# Patient Record
Sex: FEMALE | Race: BLACK OR AFRICAN AMERICAN | NOT HISPANIC OR LATINO
[De-identification: names, ages, dates, MRNs, and addresses within clinical notes are randomized per-mention and may not be internally consistent; named-entity substitution may affect disease eponyms.]

---

## 2021-02-12 ENCOUNTER — APPOINTMENT (OUTPATIENT)
Dept: RADIOLOGY | Facility: HOSPITAL | Age: 61
End: 2021-02-12
Payer: COMMERCIAL

## 2021-02-12 ENCOUNTER — OUTPATIENT (OUTPATIENT)
Dept: OUTPATIENT SERVICES | Facility: HOSPITAL | Age: 61
LOS: 1 days | End: 2021-02-12
Payer: COMMERCIAL

## 2021-02-12 DIAGNOSIS — R06.89 OTHER ABNORMALITIES OF BREATHING: ICD-10-CM

## 2021-02-12 DIAGNOSIS — K21.9 GASTRO-ESOPHAGEAL REFLUX DISEASE WITHOUT ESOPHAGITIS: ICD-10-CM

## 2021-02-12 DIAGNOSIS — R13.14 DYSPHAGIA, PHARYNGOESOPHAGEAL PHASE: ICD-10-CM

## 2021-02-12 DIAGNOSIS — Z00.00 ENCOUNTER FOR GENERAL ADULT MEDICAL EXAMINATION WITHOUT ABNORMAL FINDINGS: ICD-10-CM

## 2021-02-12 DIAGNOSIS — R49.0 DYSPHONIA: ICD-10-CM

## 2021-02-12 PROCEDURE — 74221 X-RAY XM ESOPHAGUS 2CNTRST: CPT | Mod: 26

## 2021-02-12 PROCEDURE — 74221 X-RAY XM ESOPHAGUS 2CNTRST: CPT

## 2021-03-15 ENCOUNTER — TRANSCRIPTION ENCOUNTER (OUTPATIENT)
Age: 61
End: 2021-03-15

## 2021-06-18 ENCOUNTER — TRANSCRIPTION ENCOUNTER (OUTPATIENT)
Age: 61
End: 2021-06-18

## 2024-08-28 ENCOUNTER — APPOINTMENT (OUTPATIENT)
Dept: GASTROENTEROLOGY | Facility: CLINIC | Age: 64
End: 2024-08-28
Payer: COMMERCIAL

## 2024-08-28 VITALS
SYSTOLIC BLOOD PRESSURE: 134 MMHG | BODY MASS INDEX: 25.58 KG/M2 | HEART RATE: 63 BPM | DIASTOLIC BLOOD PRESSURE: 76 MMHG | WEIGHT: 139 LBS | HEIGHT: 62 IN | OXYGEN SATURATION: 97 %

## 2024-08-28 DIAGNOSIS — Z78.9 OTHER SPECIFIED HEALTH STATUS: ICD-10-CM

## 2024-08-28 DIAGNOSIS — Z86.59 PERSONAL HISTORY OF OTHER MENTAL AND BEHAVIORAL DISORDERS: ICD-10-CM

## 2024-08-28 DIAGNOSIS — K59.00 CONSTIPATION, UNSPECIFIED: ICD-10-CM

## 2024-08-28 DIAGNOSIS — R14.0 ABDOMINAL DISTENSION (GASEOUS): ICD-10-CM

## 2024-08-28 DIAGNOSIS — Z86.79 PERSONAL HISTORY OF OTHER DISEASES OF THE CIRCULATORY SYSTEM: ICD-10-CM

## 2024-08-28 PROCEDURE — 99204 OFFICE O/P NEW MOD 45 MIN: CPT

## 2024-08-28 RX ORDER — SODIUM PICOSULFATE, MAGNESIUM OXIDE, AND ANHYDROUS CITRIC ACID 12; 3.5; 1 G/175ML; G/175ML; MG/175ML
10-3.5-12 MG-GM LIQUID ORAL
Qty: 2 | Refills: 0 | Status: ACTIVE | COMMUNITY
Start: 2024-08-28 | End: 1900-01-01

## 2024-08-28 NOTE — REVIEW OF SYSTEMS
[As Noted in HPI] : as noted in HPI [Fever] : no fever [Chills] : no chills [Red Eyes] : eyes not red [Sore Throat] : no sore throat [Chest Pain] : no chest pain [SOB on Exertion] : no shortness of breath during exertion [Rash] : no rash [Joint Stiffness] : no joint stiffness [Skin Wound] : no skin wound [Dizziness] : no dizziness [Anxiety] : no anxiety [Muscle Weakness] : no muscle weakness [Easy Bruising] : no tendency for easy bruising

## 2024-08-28 NOTE — HISTORY OF PRESENT ILLNESS
[FreeTextEntry1] : 65 yo female with c/o early satiety wtih bloating for 2 years., no n/v. no gerd. weight stable.  Patient has 4 bms a day,  small amounts. no brbpr, no melena. went gluten free , less brain fog.  last colonoscopy 11 years ago normal per patient h/o egd 11 years ago 
WILLIAM

## 2024-08-28 NOTE — ASSESSMENT
[FreeTextEntry1] : 1. early satiety unable to eat large meals with brain fog, does not want to try ppi  for acid related symptoms  plan recommend egd to r/o gastritis, sprue etc. Discussed risks including but not limited to bleeding,infection,drug reaction, perforation,missed lesion,benefits and alternatives of colonoscopy/egd with patient  including no treatment and patient consents to procedure.  -multple small meals labs for celiac disease today  2. average risk for colon cancer recommend colonoscopy for screening  plan colonoscopy to be scheduled

## 2024-08-28 NOTE — PHYSICAL EXAM
[Alert] : alert [Healthy Appearing] : healthy appearing [Sclera] : the sclera and conjunctiva were normal [Normal Appearance] : the appearance of the neck was normal [No Respiratory Distress] : no respiratory distress [Auscultation Breath Sounds / Voice Sounds] : lungs were clear to auscultation bilaterally [Normal S1, S2] : normal S1 and S2 [None] : no edema [Bowel Sounds] : normal bowel sounds [Abdomen Tenderness] : non-tender [Abdomen Soft] : soft [No CVA Tenderness] : no CVA  tenderness [Abnormal Walk] : normal gait [No Clubbing, Cyanosis] : no clubbing or cyanosis of the fingernails [] : no rash [No Focal Deficits] : no focal deficits [Oriented To Time, Place, And Person] : oriented to person, place, and time

## 2024-08-30 LAB
ENDOMYSIUM IGA SER QL: NEGATIVE
ENDOMYSIUM IGA TITR SER: NORMAL
GLIADIN IGA SER QL: <0.2 U/ML
GLIADIN IGG SER QL: <0.4 U/ML
GLIADIN PEPTIDE IGA SER-ACNC: NEGATIVE
GLIADIN PEPTIDE IGG SER-ACNC: NEGATIVE
IGA SER QL IEP: 123 MG/DL
TTG IGA SER IA-ACNC: <0.5 U/ML
TTG IGA SER-ACNC: NEGATIVE
TTG IGG SER IA-ACNC: <0.8 U/ML
TTG IGG SER IA-ACNC: NEGATIVE

## 2024-11-07 ENCOUNTER — APPOINTMENT (OUTPATIENT)
Dept: CARDIOLOGY | Facility: CLINIC | Age: 64
End: 2024-11-07
Payer: COMMERCIAL

## 2024-11-07 ENCOUNTER — NON-APPOINTMENT (OUTPATIENT)
Age: 64
End: 2024-11-07

## 2024-11-07 VITALS
WEIGHT: 139 LBS | BODY MASS INDEX: 25.58 KG/M2 | HEART RATE: 68 BPM | SYSTOLIC BLOOD PRESSURE: 156 MMHG | OXYGEN SATURATION: 99 % | DIASTOLIC BLOOD PRESSURE: 83 MMHG | HEIGHT: 62 IN

## 2024-11-07 VITALS — SYSTOLIC BLOOD PRESSURE: 160 MMHG | DIASTOLIC BLOOD PRESSURE: 86 MMHG

## 2024-11-07 DIAGNOSIS — R07.89 OTHER CHEST PAIN: ICD-10-CM

## 2024-11-07 DIAGNOSIS — Z84.89 FAMILY HISTORY OF OTHER SPECIFIED CONDITIONS: ICD-10-CM

## 2024-11-07 DIAGNOSIS — Z83.438 FAMILY HISTORY OF OTHER DISORDER OF LIPOPROTEIN METABOLISM AND OTHER LIPIDEMIA: ICD-10-CM

## 2024-11-07 DIAGNOSIS — Z82.49 FAMILY HISTORY OF ISCHEMIC HEART DISEASE AND OTHER DISEASES OF THE CIRCULATORY SYSTEM: ICD-10-CM

## 2024-11-07 DIAGNOSIS — I10 ESSENTIAL (PRIMARY) HYPERTENSION: ICD-10-CM

## 2024-11-07 DIAGNOSIS — Z00.00 ENCOUNTER FOR GENERAL ADULT MEDICAL EXAMINATION W/OUT ABNORMAL FINDINGS: ICD-10-CM

## 2024-11-07 DIAGNOSIS — R06.02 SHORTNESS OF BREATH: ICD-10-CM

## 2024-11-07 PROCEDURE — 99205 OFFICE O/P NEW HI 60 MIN: CPT

## 2024-11-07 RX ORDER — LOSARTAN POTASSIUM 25 MG/1
25 TABLET, FILM COATED ORAL DAILY
Qty: 30 | Refills: 3 | Status: ACTIVE | COMMUNITY
Start: 2024-11-07 | End: 1900-01-01

## 2024-11-07 RX ORDER — AMLODIPINE BESYLATE 5 MG/1
5 TABLET ORAL DAILY
Qty: 30 | Refills: 5 | Status: ACTIVE | COMMUNITY
Start: 2024-11-07 | End: 1900-01-01

## 2024-11-08 PROBLEM — R06.02 EXERTIONAL SHORTNESS OF BREATH: Status: ACTIVE | Noted: 2024-11-08

## 2024-11-11 ENCOUNTER — NON-APPOINTMENT (OUTPATIENT)
Age: 64
End: 2024-11-11

## 2024-11-19 ENCOUNTER — APPOINTMENT (OUTPATIENT)
Dept: PULMONOLOGY | Facility: CLINIC | Age: 64
End: 2024-11-19
Payer: COMMERCIAL

## 2024-11-19 ENCOUNTER — LABORATORY RESULT (OUTPATIENT)
Age: 64
End: 2024-11-19

## 2024-11-19 VITALS
WEIGHT: 137 LBS | DIASTOLIC BLOOD PRESSURE: 82 MMHG | HEIGHT: 62.6 IN | SYSTOLIC BLOOD PRESSURE: 145 MMHG | BODY MASS INDEX: 24.58 KG/M2 | HEART RATE: 71 BPM | OXYGEN SATURATION: 100 %

## 2024-11-19 DIAGNOSIS — R06.02 SHORTNESS OF BREATH: ICD-10-CM

## 2024-11-19 DIAGNOSIS — R07.89 OTHER CHEST PAIN: ICD-10-CM

## 2024-11-19 PROCEDURE — 99205 OFFICE O/P NEW HI 60 MIN: CPT | Mod: 25

## 2024-11-19 PROCEDURE — 94726 PLETHYSMOGRAPHY LUNG VOLUMES: CPT

## 2024-11-19 PROCEDURE — 94060 EVALUATION OF WHEEZING: CPT

## 2024-11-19 PROCEDURE — ZZZZZ: CPT

## 2024-11-19 PROCEDURE — 94729 DIFFUSING CAPACITY: CPT

## 2024-11-20 LAB
BASOPHILS # BLD AUTO: 0.04 K/UL
BASOPHILS NFR BLD AUTO: 1 %
EOSINOPHIL # BLD AUTO: 0.12 K/UL
EOSINOPHIL NFR BLD AUTO: 3.1 %
HCT VFR BLD CALC: 37.4 %
HGB BLD-MCNC: 11.6 G/DL
IMM GRANULOCYTES NFR BLD AUTO: 0.3 %
LYMPHOCYTES # BLD AUTO: 1.41 K/UL
LYMPHOCYTES NFR BLD AUTO: 36.8 %
MAN DIFF?: NORMAL
MCHC RBC-ENTMCNC: 24.3 PG
MCHC RBC-ENTMCNC: 31 G/DL
MCV RBC AUTO: 78.2 FL
MONOCYTES # BLD AUTO: 0.38 K/UL
MONOCYTES NFR BLD AUTO: 9.9 %
NEUTROPHILS # BLD AUTO: 1.87 K/UL
NEUTROPHILS NFR BLD AUTO: 48.9 %
PLATELET # BLD AUTO: 174 K/UL
RBC # BLD: 4.78 M/UL
RBC # FLD: 14.8 %
WBC # FLD AUTO: 3.83 K/UL

## 2024-11-21 ENCOUNTER — NON-APPOINTMENT (OUTPATIENT)
Age: 64
End: 2024-11-21

## 2024-11-22 ENCOUNTER — NON-APPOINTMENT (OUTPATIENT)
Age: 64
End: 2024-11-22

## 2024-11-22 LAB
A ALTERNATA IGE QN: <0.1 KUA/L
A FUMIGATUS IGE QN: <0.1 KUA/L
C ALBICANS IGE QN: <0.1 KUA/L
C HERBARUM IGE QN: <0.1 KUA/L
CAT DANDER IGE QN: <0.1 KUA/L
COMMON RAGWEED IGE QN: <0.1 KUA/L
D FARINAE IGE QN: 29.9 KUA/L
D PTERONYSS IGE QN: 9.81 KUA/L
DEPRECATED A ALTERNATA IGE RAST QL: 0
DEPRECATED A FUMIGATUS IGE RAST QL: 0
DEPRECATED C ALBICANS IGE RAST QL: 0
DEPRECATED C HERBARUM IGE RAST QL: 0
DEPRECATED CAT DANDER IGE RAST QL: 0
DEPRECATED COMMON RAGWEED IGE RAST QL: 0
DEPRECATED D FARINAE IGE RAST QL: 4
DEPRECATED D PTERONYSS IGE RAST QL: 3
DEPRECATED DOG DANDER IGE RAST QL: 0
DEPRECATED M RACEMOSUS IGE RAST QL: 0
DEPRECATED ROACH IGE RAST QL: 0
DEPRECATED TIMOTHY IGE RAST QL: 0
DEPRECATED WHITE OAK IGE RAST QL: 0
DOG DANDER IGE QN: <0.1 KUA/L
M RACEMOSUS IGE QN: <0.1 KUA/L
ROACH IGE QN: <0.1 KUA/L
TIMOTHY IGE QN: <0.1 KUA/L
TOTAL IGE SMQN RAST: 147 KU/L
WHITE OAK IGE QN: <0.1 KUA/L

## 2024-11-27 ENCOUNTER — TRANSCRIPTION ENCOUNTER (OUTPATIENT)
Age: 64
End: 2024-11-27

## 2024-12-06 ENCOUNTER — APPOINTMENT (OUTPATIENT)
Dept: CARDIOLOGY | Facility: CLINIC | Age: 64
End: 2024-12-06

## 2024-12-12 DIAGNOSIS — R05.9 COUGH, UNSPECIFIED: ICD-10-CM

## 2024-12-12 RX ORDER — AZELASTINE HYDROCHLORIDE 137 UG/1
137 SPRAY, METERED NASAL TWICE DAILY
Qty: 1 | Refills: 5 | Status: ACTIVE | COMMUNITY
Start: 2024-12-12 | End: 1900-01-01

## 2024-12-12 RX ORDER — FLUTICASONE PROPIONATE 50 UG/1
50 SPRAY, METERED NASAL
Qty: 1 | Refills: 2 | Status: ACTIVE | COMMUNITY
Start: 2024-12-12 | End: 1900-01-01

## 2024-12-18 ENCOUNTER — APPOINTMENT (OUTPATIENT)
Dept: CARDIOLOGY | Facility: CLINIC | Age: 64
End: 2024-12-18
Payer: COMMERCIAL

## 2024-12-18 PROCEDURE — 93306 TTE W/DOPPLER COMPLETE: CPT

## 2024-12-23 ENCOUNTER — NON-APPOINTMENT (OUTPATIENT)
Age: 64
End: 2024-12-23

## 2024-12-31 ENCOUNTER — APPOINTMENT (OUTPATIENT)
Dept: RADIOLOGY | Facility: IMAGING CENTER | Age: 64
End: 2024-12-31
Payer: COMMERCIAL

## 2024-12-31 ENCOUNTER — NON-APPOINTMENT (OUTPATIENT)
Age: 64
End: 2024-12-31

## 2024-12-31 ENCOUNTER — OUTPATIENT (OUTPATIENT)
Dept: OUTPATIENT SERVICES | Facility: HOSPITAL | Age: 64
LOS: 1 days | End: 2024-12-31
Payer: COMMERCIAL

## 2024-12-31 DIAGNOSIS — R05.9 COUGH, UNSPECIFIED: ICD-10-CM

## 2024-12-31 PROCEDURE — 71046 X-RAY EXAM CHEST 2 VIEWS: CPT | Mod: 26

## 2024-12-31 PROCEDURE — 71046 X-RAY EXAM CHEST 2 VIEWS: CPT

## 2025-01-01 ENCOUNTER — NON-APPOINTMENT (OUTPATIENT)
Age: 65
End: 2025-01-01

## 2025-01-01 LAB
RAPID RVP RESULT: NOT DETECTED
SARS-COV-2 RNA RESP QL NAA+PROBE: NOT DETECTED

## 2025-01-02 ENCOUNTER — APPOINTMENT (OUTPATIENT)
Dept: RADIOLOGY | Facility: IMAGING CENTER | Age: 65
End: 2025-01-02

## 2025-01-06 ENCOUNTER — NON-APPOINTMENT (OUTPATIENT)
Age: 65
End: 2025-01-06

## 2025-01-06 RX ORDER — AZITHROMYCIN 250 MG/1
250 TABLET, FILM COATED ORAL
Qty: 1 | Refills: 0 | Status: ACTIVE | COMMUNITY
Start: 2025-01-06 | End: 1900-01-01

## 2025-01-08 ENCOUNTER — EMERGENCY (EMERGENCY)
Facility: HOSPITAL | Age: 65
LOS: 1 days | Discharge: HOME | End: 2025-01-08
Attending: STUDENT IN AN ORGANIZED HEALTH CARE EDUCATION/TRAINING PROGRAM
Payer: COMMERCIAL

## 2025-01-08 VITALS
SYSTOLIC BLOOD PRESSURE: 157 MMHG | DIASTOLIC BLOOD PRESSURE: 82 MMHG | TEMPERATURE: 99 F | OXYGEN SATURATION: 98 % | HEART RATE: 116 BPM | HEIGHT: 62 IN | WEIGHT: 134.92 LBS | RESPIRATION RATE: 18 BRPM

## 2025-01-08 PROCEDURE — 99285 EMERGENCY DEPT VISIT HI MDM: CPT

## 2025-01-09 VITALS
SYSTOLIC BLOOD PRESSURE: 137 MMHG | HEART RATE: 80 BPM | TEMPERATURE: 99 F | RESPIRATION RATE: 18 BRPM | DIASTOLIC BLOOD PRESSURE: 92 MMHG | OXYGEN SATURATION: 98 %

## 2025-01-09 LAB
ALBUMIN SERPL ELPH-MCNC: 4 G/DL — SIGNIFICANT CHANGE UP (ref 3.3–5)
ALP SERPL-CCNC: 85 U/L — SIGNIFICANT CHANGE UP (ref 40–120)
ALT FLD-CCNC: 31 U/L — SIGNIFICANT CHANGE UP (ref 10–45)
ANION GAP SERPL CALC-SCNC: 13 MMOL/L — SIGNIFICANT CHANGE UP (ref 5–17)
AST SERPL-CCNC: 23 U/L — SIGNIFICANT CHANGE UP (ref 10–40)
BASOPHILS # BLD AUTO: 0.03 K/UL — SIGNIFICANT CHANGE UP (ref 0–0.2)
BASOPHILS NFR BLD AUTO: 0.4 % — SIGNIFICANT CHANGE UP (ref 0–2)
BILIRUB SERPL-MCNC: 0.2 MG/DL — SIGNIFICANT CHANGE UP (ref 0.2–1.2)
BUN SERPL-MCNC: 18 MG/DL — SIGNIFICANT CHANGE UP (ref 7–23)
CALCIUM SERPL-MCNC: 9.2 MG/DL — SIGNIFICANT CHANGE UP (ref 8.4–10.5)
CHLORIDE SERPL-SCNC: 97 MMOL/L — SIGNIFICANT CHANGE UP (ref 96–108)
CO2 SERPL-SCNC: 26 MMOL/L — SIGNIFICANT CHANGE UP (ref 22–31)
CREAT SERPL-MCNC: 0.61 MG/DL — SIGNIFICANT CHANGE UP (ref 0.5–1.3)
EGFR: 100 ML/MIN/1.73M2 — SIGNIFICANT CHANGE UP
EGFR: 100 ML/MIN/1.73M2 — SIGNIFICANT CHANGE UP
EOSINOPHIL # BLD AUTO: 0.07 K/UL — SIGNIFICANT CHANGE UP (ref 0–0.5)
EOSINOPHIL NFR BLD AUTO: 1 % — SIGNIFICANT CHANGE UP (ref 0–6)
FLUAV AG NPH QL: SIGNIFICANT CHANGE UP
FLUBV AG NPH QL: SIGNIFICANT CHANGE UP
GLUCOSE SERPL-MCNC: 93 MG/DL — SIGNIFICANT CHANGE UP (ref 70–99)
HCT VFR BLD CALC: 33.5 % — LOW (ref 34.5–45)
HGB BLD-MCNC: 10.4 G/DL — LOW (ref 11.5–15.5)
IMM GRANULOCYTES NFR BLD AUTO: 0.6 % — SIGNIFICANT CHANGE UP (ref 0–0.9)
LIDOCAIN IGE QN: 19 U/L — SIGNIFICANT CHANGE UP (ref 7–60)
LYMPHOCYTES # BLD AUTO: 1.29 K/UL — SIGNIFICANT CHANGE UP (ref 1–3.3)
LYMPHOCYTES # BLD AUTO: 18.9 % — SIGNIFICANT CHANGE UP (ref 13–44)
MAGNESIUM SERPL-MCNC: 2.2 MG/DL — SIGNIFICANT CHANGE UP (ref 1.6–2.6)
MCHC RBC-ENTMCNC: 23.7 PG — LOW (ref 27–34)
MCHC RBC-ENTMCNC: 31 G/DL — LOW (ref 32–36)
MCV RBC AUTO: 76.3 FL — LOW (ref 80–100)
MONOCYTES # BLD AUTO: 0.48 K/UL — SIGNIFICANT CHANGE UP (ref 0–0.9)
MONOCYTES NFR BLD AUTO: 7 % — SIGNIFICANT CHANGE UP (ref 2–14)
NEUTROPHILS # BLD AUTO: 4.91 K/UL — SIGNIFICANT CHANGE UP (ref 1.8–7.4)
NEUTROPHILS NFR BLD AUTO: 72.1 % — SIGNIFICANT CHANGE UP (ref 43–77)
NRBC # BLD: 0 /100 WBCS — SIGNIFICANT CHANGE UP (ref 0–0)
NRBC BLD-RTO: 0 /100 WBCS — SIGNIFICANT CHANGE UP (ref 0–0)
NT-PROBNP SERPL-SCNC: 61 PG/ML — SIGNIFICANT CHANGE UP (ref 0–300)
PLATELET # BLD AUTO: 205 K/UL — SIGNIFICANT CHANGE UP (ref 150–400)
POTASSIUM SERPL-MCNC: 3.6 MMOL/L — SIGNIFICANT CHANGE UP (ref 3.5–5.3)
POTASSIUM SERPL-SCNC: 3.6 MMOL/L — SIGNIFICANT CHANGE UP (ref 3.5–5.3)
PROT SERPL-MCNC: 8.3 G/DL — SIGNIFICANT CHANGE UP (ref 6–8.3)
RBC # BLD: 4.39 M/UL — SIGNIFICANT CHANGE UP (ref 3.8–5.2)
RBC # FLD: 14.7 % — HIGH (ref 10.3–14.5)
RSV RNA NPH QL NAA+NON-PROBE: SIGNIFICANT CHANGE UP
SARS-COV-2 RNA SPEC QL NAA+PROBE: SIGNIFICANT CHANGE UP
SODIUM SERPL-SCNC: 136 MMOL/L — SIGNIFICANT CHANGE UP (ref 135–145)
TROPONIN T, HIGH SENSITIVITY RESULT: 9 NG/L — SIGNIFICANT CHANGE UP (ref 0–51)
WBC # BLD: 6.82 K/UL — SIGNIFICANT CHANGE UP (ref 3.8–10.5)
WBC # FLD AUTO: 6.82 K/UL — SIGNIFICANT CHANGE UP (ref 3.8–10.5)

## 2025-01-09 PROCEDURE — 71046 X-RAY EXAM CHEST 2 VIEWS: CPT | Mod: 26

## 2025-01-09 PROCEDURE — 96374 THER/PROPH/DIAG INJ IV PUSH: CPT

## 2025-01-09 PROCEDURE — 96375 TX/PRO/DX INJ NEW DRUG ADDON: CPT

## 2025-01-09 PROCEDURE — 71046 X-RAY EXAM CHEST 2 VIEWS: CPT

## 2025-01-09 PROCEDURE — 87637 SARSCOV2&INF A&B&RSV AMP PRB: CPT

## 2025-01-09 PROCEDURE — 84484 ASSAY OF TROPONIN QUANT: CPT

## 2025-01-09 PROCEDURE — 85025 COMPLETE CBC W/AUTO DIFF WBC: CPT

## 2025-01-09 PROCEDURE — 36415 COLL VENOUS BLD VENIPUNCTURE: CPT

## 2025-01-09 PROCEDURE — 80053 COMPREHEN METABOLIC PANEL: CPT

## 2025-01-09 PROCEDURE — 83735 ASSAY OF MAGNESIUM: CPT

## 2025-01-09 PROCEDURE — 93005 ELECTROCARDIOGRAM TRACING: CPT | Mod: 76

## 2025-01-09 PROCEDURE — 83880 ASSAY OF NATRIURETIC PEPTIDE: CPT

## 2025-01-09 PROCEDURE — 83690 ASSAY OF LIPASE: CPT

## 2025-01-09 PROCEDURE — 99285 EMERGENCY DEPT VISIT HI MDM: CPT | Mod: 25

## 2025-01-09 RX ORDER — MAGNESIUM, ALUMINUM HYDROXIDE 200-200 MG
30 TABLET,CHEWABLE ORAL ONCE
Refills: 0 | Status: COMPLETED | OUTPATIENT
Start: 2025-01-09 | End: 2025-01-09

## 2025-01-09 RX ORDER — ACETAMINOPHEN 500 MG/5ML
1000 LIQUID (ML) ORAL ONCE
Refills: 0 | Status: COMPLETED | OUTPATIENT
Start: 2025-01-09 | End: 2025-01-09

## 2025-01-09 RX ADMIN — Medication 30 MILLILITER(S): at 02:23

## 2025-01-09 RX ADMIN — Medication 20 MILLIGRAM(S): at 02:23

## 2025-01-09 RX ADMIN — Medication 400 MILLIGRAM(S): at 02:25

## 2025-01-10 ENCOUNTER — APPOINTMENT (OUTPATIENT)
Dept: CV DIAGNOSITCS | Facility: HOSPITAL | Age: 65
End: 2025-01-10

## 2025-01-13 ENCOUNTER — APPOINTMENT (OUTPATIENT)
Dept: PULMONOLOGY | Facility: CLINIC | Age: 65
End: 2025-01-13
Payer: COMMERCIAL

## 2025-01-13 VITALS
BODY MASS INDEX: 23.22 KG/M2 | WEIGHT: 129.38 LBS | TEMPERATURE: 98.1 F | OXYGEN SATURATION: 96 % | RESPIRATION RATE: 15 BRPM | SYSTOLIC BLOOD PRESSURE: 137 MMHG | HEART RATE: 74 BPM | DIASTOLIC BLOOD PRESSURE: 79 MMHG | HEIGHT: 62.6 IN

## 2025-01-13 DIAGNOSIS — R05.9 COUGH, UNSPECIFIED: ICD-10-CM

## 2025-01-13 PROCEDURE — 99213 OFFICE O/P EST LOW 20 MIN: CPT

## 2025-01-31 ENCOUNTER — APPOINTMENT (OUTPATIENT)
Dept: INTERNAL MEDICINE | Facility: CLINIC | Age: 65
End: 2025-01-31
Payer: COMMERCIAL

## 2025-01-31 VITALS
HEIGHT: 62 IN | SYSTOLIC BLOOD PRESSURE: 132 MMHG | WEIGHT: 126 LBS | HEART RATE: 71 BPM | OXYGEN SATURATION: 100 % | DIASTOLIC BLOOD PRESSURE: 80 MMHG | TEMPERATURE: 98.8 F | BODY MASS INDEX: 23.19 KG/M2

## 2025-01-31 DIAGNOSIS — K21.9 GASTRO-ESOPHAGEAL REFLUX DISEASE W/OUT ESOPHAGITIS: ICD-10-CM

## 2025-01-31 DIAGNOSIS — R13.10 DYSPHAGIA, UNSPECIFIED: ICD-10-CM

## 2025-01-31 PROCEDURE — 99203 OFFICE O/P NEW LOW 30 MIN: CPT

## 2025-02-06 ENCOUNTER — APPOINTMENT (OUTPATIENT)
Dept: INTERNAL MEDICINE | Facility: CLINIC | Age: 65
End: 2025-02-06

## 2025-02-06 LAB — UREA BREATH TEST QL: NEGATIVE

## 2025-02-07 ENCOUNTER — RX CHANGE (OUTPATIENT)
Age: 65
End: 2025-02-07

## 2025-02-07 ENCOUNTER — NON-APPOINTMENT (OUTPATIENT)
Age: 65
End: 2025-02-07

## 2025-02-11 ENCOUNTER — APPOINTMENT (OUTPATIENT)
Dept: CARDIOLOGY | Facility: CLINIC | Age: 65
End: 2025-02-11
Payer: COMMERCIAL

## 2025-02-11 PROCEDURE — G2211 COMPLEX E/M VISIT ADD ON: CPT | Mod: NC,95

## 2025-02-11 PROCEDURE — 99214 OFFICE O/P EST MOD 30 MIN: CPT | Mod: 95

## 2025-02-11 RX ORDER — AMLODIPINE BESYLATE 5 MG/1
5 TABLET ORAL DAILY
Qty: 90 | Refills: 3 | Status: ACTIVE | COMMUNITY
Start: 2025-02-11 | End: 1900-01-01

## 2025-02-14 DIAGNOSIS — M79.669 PAIN IN UNSPECIFIED LOWER LEG: ICD-10-CM

## 2025-02-15 ENCOUNTER — TRANSCRIPTION ENCOUNTER (OUTPATIENT)
Age: 65
End: 2025-02-15

## 2025-02-16 ENCOUNTER — OUTPATIENT (OUTPATIENT)
Dept: OUTPATIENT SERVICES | Facility: HOSPITAL | Age: 65
LOS: 1 days | End: 2025-02-16
Payer: COMMERCIAL

## 2025-02-16 DIAGNOSIS — Z00.8 ENCOUNTER FOR OTHER GENERAL EXAMINATION: ICD-10-CM

## 2025-02-16 DIAGNOSIS — M79.669 PAIN IN UNSPECIFIED LOWER LEG: ICD-10-CM

## 2025-02-16 PROCEDURE — 93970 EXTREMITY STUDY: CPT

## 2025-03-07 ENCOUNTER — INPATIENT (INPATIENT)
Facility: HOSPITAL | Age: 65
LOS: 3 days | Discharge: HOME | DRG: 313 | End: 2025-03-11
Attending: INTERNAL MEDICINE | Admitting: STUDENT IN AN ORGANIZED HEALTH CARE EDUCATION/TRAINING PROGRAM
Payer: COMMERCIAL

## 2025-03-07 ENCOUNTER — APPOINTMENT (OUTPATIENT)
Dept: INTERNAL MEDICINE | Facility: CLINIC | Age: 65
End: 2025-03-07
Payer: COMMERCIAL

## 2025-03-07 ENCOUNTER — APPOINTMENT (OUTPATIENT)
Dept: INTERNAL MEDICINE | Facility: CLINIC | Age: 65
End: 2025-03-07

## 2025-03-07 ENCOUNTER — NON-APPOINTMENT (OUTPATIENT)
Age: 65
End: 2025-03-07

## 2025-03-07 VITALS
WEIGHT: 115.96 LBS | OXYGEN SATURATION: 99 % | HEART RATE: 111 BPM | HEIGHT: 62 IN | SYSTOLIC BLOOD PRESSURE: 117 MMHG | RESPIRATION RATE: 18 BRPM | DIASTOLIC BLOOD PRESSURE: 73 MMHG | TEMPERATURE: 98 F

## 2025-03-07 VITALS — HEART RATE: 118 BPM | OXYGEN SATURATION: 98 %

## 2025-03-07 VITALS
BODY MASS INDEX: 21.35 KG/M2 | OXYGEN SATURATION: 94 % | WEIGHT: 116 LBS | SYSTOLIC BLOOD PRESSURE: 122 MMHG | HEIGHT: 62 IN | HEART RATE: 107 BPM | DIASTOLIC BLOOD PRESSURE: 80 MMHG

## 2025-03-07 DIAGNOSIS — Z13.228 ENCOUNTER FOR SCREENING FOR OTHER METABOLIC DISORDERS: ICD-10-CM

## 2025-03-07 DIAGNOSIS — R07.9 CHEST PAIN, UNSPECIFIED: ICD-10-CM

## 2025-03-07 LAB
ALBUMIN SERPL ELPH-MCNC: 2.9 G/DL — LOW (ref 3.3–5)
ALP SERPL-CCNC: 49 U/L — SIGNIFICANT CHANGE UP (ref 40–120)
ALT FLD-CCNC: 10 U/L — SIGNIFICANT CHANGE UP (ref 10–45)
ANION GAP SERPL CALC-SCNC: 13 MMOL/L — SIGNIFICANT CHANGE UP (ref 5–17)
ANISOCYTOSIS BLD QL: SLIGHT — SIGNIFICANT CHANGE UP
APPEARANCE UR: CLEAR — SIGNIFICANT CHANGE UP
AST SERPL-CCNC: 15 U/L — SIGNIFICANT CHANGE UP (ref 10–40)
BACTERIA # UR AUTO: NEGATIVE /HPF — SIGNIFICANT CHANGE UP
BASOPHILS # BLD AUTO: 0 K/UL — SIGNIFICANT CHANGE UP (ref 0–0.2)
BASOPHILS NFR BLD AUTO: 0 % — SIGNIFICANT CHANGE UP (ref 0–2)
BILIRUB SERPL-MCNC: 0.3 MG/DL — SIGNIFICANT CHANGE UP (ref 0.2–1.2)
BILIRUB UR-MCNC: NEGATIVE — SIGNIFICANT CHANGE UP
BUN SERPL-MCNC: 14 MG/DL — SIGNIFICANT CHANGE UP (ref 7–23)
CALCIUM SERPL-MCNC: 8.9 MG/DL — SIGNIFICANT CHANGE UP (ref 8.4–10.5)
CAST: 3 /LPF — SIGNIFICANT CHANGE UP (ref 0–4)
CHLORIDE SERPL-SCNC: 97 MMOL/L — SIGNIFICANT CHANGE UP (ref 96–108)
CO2 SERPL-SCNC: 24 MMOL/L — SIGNIFICANT CHANGE UP (ref 22–31)
COLOR SPEC: YELLOW — SIGNIFICANT CHANGE UP
CREAT SERPL-MCNC: 0.51 MG/DL — SIGNIFICANT CHANGE UP (ref 0.5–1.3)
D DIMER BLD IA.RAPID-MCNC: 629 NG/ML DDU — HIGH
DACRYOCYTES BLD QL SMEAR: SLIGHT — SIGNIFICANT CHANGE UP
DIFF PNL FLD: NEGATIVE — SIGNIFICANT CHANGE UP
EGFR: 105 ML/MIN/1.73M2 — SIGNIFICANT CHANGE UP
EGFR: 105 ML/MIN/1.73M2 — SIGNIFICANT CHANGE UP
ELLIPTOCYTES BLD QL SMEAR: SLIGHT — SIGNIFICANT CHANGE UP
EOSINOPHIL # BLD AUTO: 0.24 K/UL — SIGNIFICANT CHANGE UP (ref 0–0.5)
EOSINOPHIL NFR BLD AUTO: 1.7 % — SIGNIFICANT CHANGE UP (ref 0–6)
FLUAV AG NPH QL: SIGNIFICANT CHANGE UP
FLUBV AG NPH QL: SIGNIFICANT CHANGE UP
GAS PNL BLDV: SIGNIFICANT CHANGE UP
GLUCOSE SERPL-MCNC: 97 MG/DL — SIGNIFICANT CHANGE UP (ref 70–99)
GLUCOSE UR QL: NEGATIVE MG/DL — SIGNIFICANT CHANGE UP
HCT VFR BLD CALC: 25.7 % — LOW (ref 34.5–45)
HGB BLD-MCNC: 8 G/DL — LOW (ref 11.5–15.5)
HYPOCHROMIA BLD QL: SLIGHT — SIGNIFICANT CHANGE UP
KETONES UR-MCNC: 15 MG/DL
LEUKOCYTE ESTERASE UR-ACNC: NEGATIVE — SIGNIFICANT CHANGE UP
LIDOCAIN IGE QN: 14 U/L — SIGNIFICANT CHANGE UP (ref 7–60)
LYMPHOCYTES # BLD AUTO: 1.22 K/UL — SIGNIFICANT CHANGE UP (ref 1–3.3)
LYMPHOCYTES # BLD AUTO: 8.6 % — LOW (ref 13–44)
MACROCYTES BLD QL: SLIGHT — SIGNIFICANT CHANGE UP
MAGNESIUM SERPL-MCNC: 2 MG/DL — SIGNIFICANT CHANGE UP (ref 1.6–2.6)
MANUAL SMEAR VERIFICATION: SIGNIFICANT CHANGE UP
MCHC RBC-ENTMCNC: 22.8 PG — LOW (ref 27–34)
MCHC RBC-ENTMCNC: 31.1 G/DL — LOW (ref 32–36)
MCV RBC AUTO: 73.2 FL — LOW (ref 80–100)
MONOCYTES # BLD AUTO: 0.37 K/UL — SIGNIFICANT CHANGE UP (ref 0–0.9)
MONOCYTES NFR BLD AUTO: 2.6 % — SIGNIFICANT CHANGE UP (ref 2–14)
MYELOCYTES NFR BLD: 0.9 % — HIGH (ref 0–0)
NEUTROPHILS # BLD AUTO: 12.24 K/UL — HIGH (ref 1.8–7.4)
NEUTROPHILS NFR BLD AUTO: 82.8 % — HIGH (ref 43–77)
NEUTS BAND # BLD: 3.4 % — SIGNIFICANT CHANGE UP (ref 0–8)
NEUTS BAND NFR BLD: 3.4 % — SIGNIFICANT CHANGE UP (ref 0–8)
NITRITE UR-MCNC: NEGATIVE — SIGNIFICANT CHANGE UP
NT-PROBNP SERPL-SCNC: 318 PG/ML — HIGH (ref 0–300)
OVALOCYTES BLD QL SMEAR: SLIGHT — SIGNIFICANT CHANGE UP
PH UR: 5.5 — SIGNIFICANT CHANGE UP (ref 5–8)
PLAT MORPH BLD: ABNORMAL
PLATELET # BLD AUTO: 282 K/UL — SIGNIFICANT CHANGE UP (ref 150–400)
POIKILOCYTOSIS BLD QL AUTO: SLIGHT — SIGNIFICANT CHANGE UP
POLYCHROMASIA BLD QL SMEAR: SLIGHT — SIGNIFICANT CHANGE UP
POTASSIUM SERPL-MCNC: 3.5 MMOL/L — SIGNIFICANT CHANGE UP (ref 3.5–5.3)
POTASSIUM SERPL-SCNC: 3.5 MMOL/L — SIGNIFICANT CHANGE UP (ref 3.5–5.3)
PROT SERPL-MCNC: 6.9 G/DL — SIGNIFICANT CHANGE UP (ref 6–8.3)
PROT UR-MCNC: 30 MG/DL
RBC # BLD: 3.51 M/UL — LOW (ref 3.8–5.2)
RBC # FLD: 17.1 % — HIGH (ref 10.3–14.5)
RBC BLD AUTO: ABNORMAL
RBC CASTS # UR COMP ASSIST: 2 /HPF — SIGNIFICANT CHANGE UP (ref 0–4)
RSV RNA NPH QL NAA+NON-PROBE: SIGNIFICANT CHANGE UP
SARS-COV-2 RNA SPEC QL NAA+PROBE: SIGNIFICANT CHANGE UP
SODIUM SERPL-SCNC: 134 MMOL/L — LOW (ref 135–145)
SP GR SPEC: 1.02 — SIGNIFICANT CHANGE UP (ref 1–1.03)
SQUAMOUS # UR AUTO: 3 /HPF — SIGNIFICANT CHANGE UP (ref 0–5)
TROPONIN T, HIGH SENSITIVITY RESULT: 16 NG/L — SIGNIFICANT CHANGE UP (ref 0–51)
TROPONIN T, HIGH SENSITIVITY RESULT: 16 NG/L — SIGNIFICANT CHANGE UP (ref 0–51)
UROBILINOGEN FLD QL: 1 MG/DL — SIGNIFICANT CHANGE UP (ref 0.2–1)
WBC # BLD: 14.2 K/UL — HIGH (ref 3.8–10.5)
WBC # FLD AUTO: 14.2 K/UL — HIGH (ref 3.8–10.5)
WBC UR QL: 1 /HPF — SIGNIFICANT CHANGE UP (ref 0–5)

## 2025-03-07 PROCEDURE — 71275 CT ANGIOGRAPHY CHEST: CPT | Mod: 26

## 2025-03-07 PROCEDURE — 99285 EMERGENCY DEPT VISIT HI MDM: CPT

## 2025-03-07 PROCEDURE — 74177 CT ABD & PELVIS W/CONTRAST: CPT | Mod: 26

## 2025-03-07 PROCEDURE — 93000 ELECTROCARDIOGRAM COMPLETE: CPT

## 2025-03-07 PROCEDURE — 99215 OFFICE O/P EST HI 40 MIN: CPT

## 2025-03-07 RX ORDER — ACETAMINOPHEN 500 MG/5ML
1000 LIQUID (ML) ORAL ONCE
Refills: 0 | Status: COMPLETED | OUTPATIENT
Start: 2025-03-07 | End: 2025-03-07

## 2025-03-07 RX ADMIN — Medication 400 MILLIGRAM(S): at 17:31

## 2025-03-07 RX ADMIN — Medication 1000 MILLILITER(S): at 17:31

## 2025-03-07 NOTE — CONSULT NOTE ADULT - SUBJECTIVE AND OBJECTIVE BOX
[facility] Attending Consultation Note     Patient seen and evaluated at bedside    Chief Complaint: dyspnea     HPI:  64F with HTN, MAURICIO, elevated NORMA on OP lab, recent ER visits for PNA and dysphagia sxs. She was at Dr. Lyman office today noted to be hypoxic, tachycardiac, and reported + 40 lbs wt loss. She was sent in to the ER for concern of thromboembolic event.     PMHx:   HTN (hypertension)    GERD (gastroesophageal reflux disease)        PSHx:   S/P BRENT (total abdominal hysterectomy)    S/P breast biopsy        Allergies:  NSAIDs (Hives)      Home Meds:    Current Medications:       FAMILY HISTORY:      Social History: Personally reviewed   No tobacco, EtOH or IVDU     REVIEW OF SYSTEMS:  Constitutional:     [x ] negative [ ] fevers [ ] chills [ ] weight loss [ ] weight gain  HEENT:                  [x ] negative [ ] dry eyes [ ] eye irritation [ ] postnasal drip [ ] nasal congestion  CV:                         [ x] negative  [ ] chest pain [ ] orthopnea [ ] palpitations [ ] murmur  Resp:                     [x ] negative [ ] cough [ ] shortness of breath [ ] dyspnea [ ] wheezing [ ] sputum [ ]hemoptysis  GI:                          [ x] negative [ ] nausea [ ] vomiting [ ] diarrhea [ ] constipation [ ] abd pain [ ] dysphagia   :                        [ x] negative [ ] dysuria [ ] nocturia [ ] hematuria [ ] increased urinary frequency  Musculoskeletal: [x ] negative [ ] back pain [ ] myalgias [ ] arthralgias [ ] fracture  Skin:                       [ x] negative [ ] rash [ ] itch  Neurological:        [ x] negative [ ] headache [ ] dizziness [ ] syncope [ ] weakness [ ] numbness  Psychiatric:           [ x] negative [ ] anxiety [ ] depression  Endocrine:            [ x] negative [ ] diabetes [ ] thyroid problem  Heme/Lymph:      [ x] negative [ ] anemia [ ] bleeding problem  Allergic/Immune: [ x] negative [ ] itchy eyes [ ] nasal discharge [ ] hives [ ] angioedema    [ x] All other systems negative  [ ] Unable to assess ROS due to      Physical Exam:  T(F): 98.3 (03-07), Max: 98.3 (03-07)  HR: 97 (03-07) (97 - 111)  BP: 117/64 (03-07) (117/64 - 117/73)  RR: 19 (03-07)  SpO2: 99% (03-07)    Gen: Well appearing   HENNT: No JVP   CV: tachycardiac rate, regular rhtyhm, no murmur   Pulm: clear to auscultation bilaterally   Abdomen: Non-tender, non-distended   Ext: trace edema b/l   Neuro: grossly non-focal     Cardiovascular Diagnostic Testing:      Labs: Personally reviewed                        8.0    14.20 )-----------( 282      ( 07 Mar 2025 17:24 )             25.7     03-07    134[L]  |  97  |  14  ----------------------------<  97  3.5   |  24  |  0.51    Ca    8.9      07 Mar 2025 17:24  Mg     2.0     03-07    TPro  6.9  /  Alb  2.9[L]  /  TBili  0.3  /  DBili  x   /  AST  15  /  ALT  10  /  AlkPhos  49  03-07

## 2025-03-07 NOTE — ED ADULT NURSE NOTE - NSICDXPASTSURGICALHX_GEN_ALL_CORE_FT
PAST SURGICAL HISTORY:  S/P breast biopsy ?cyst removal    S/P BRENT (total abdominal hysterectomy)

## 2025-03-07 NOTE — ED ADULT NURSE NOTE - OBJECTIVE STATEMENT
64Y F AXO3 PMH of HTN and GERD presented to the ED from home via EMS c/o difficulty swallowing and epigastric pain x 2 months. Pt reports decreased PO intake x 2 months . 64Y F AXO3 PMH of HTN and GERD presented to the ED from home via EMS c/o difficulty swallowing and epigastric pain x 2 months. Pt reports decreased PO intake, increased HR and LAW x 2 months. Upon arrival to the ED, the pt is well appearing, has bilateral even and unlabored chest rise, and ambulatory with steady gait. Upon assessment, pt has even and bilateral peripheral pulses, ROM, PERRLA, gross neuro intact, and soft, non-tender, non-distended abdomen. Pt denies fevers, chills, chest pain, n/v/d, headache, dizziness, numbness or tingling of extremities, urinary symptoms, and black or bloody stools. Comfort and safety provided, bed in lowest position, side rails up, and call bell within reach.

## 2025-03-07 NOTE — ED PROVIDER NOTE - OBJECTIVE STATEMENT
64 y.o. F, PMHx significant for HTN 64 y.o. F, PMHx significant for HTN, sent in by Dr. Lyman after patient became tachycardic, hypoxemic to 90 with chest pain and shortness of breath. Patient states she has had intermittent palpitations and shortness of breath with activity.  Over the last 8 weeks she has also had 30 pound weight loss.  Saw ENT 02/06 who diagnosed her with an "esophageal infection" for which patient has been taking fluconazole. Presented Dr. Lyman today with vitals which prompted visit to the emergency department for further evaluation and workup.  Otherwise, patient denies F/C, N/V, abd pain, dysuria, cahnges in BM.

## 2025-03-07 NOTE — CONSULT NOTE ADULT - ASSESSMENT
64F with HTN, MAURICIO, elevated NORMA on OP lab, recent ER visits for PNA and dysphagia sxs. She was at Dr. Lyman office today noted to be hypoxic, tachycardiac, and reported + 40 lbs wt loss. She was sent in to the ER for concern of thromboembolic event.     -elevated inflammatory markers on op labs and with diffuse abd sxs, recommend CT Abd/pelvis for eval of intra-abd pathology   -hypoxic and tachycardiac with inflammatory state, rec CTA to r/o PE and LE doppler to r/o DVTs   -check TTE ECHO for LV/RV function     Alexandro Bennett MD FAC  Attending Cardiologist, [facility].   Avaliable on [employer] Team   64F with HTN, MAURICIO, elevated NORMA on OP lab, recent ER visits for PNA and dysphagia sxs. She was at Dr. Lyman office today noted to be hypoxic, tachycardiac, and reported + 40 lbs wt loss. She was sent in to the ER for concern of thromboembolic event.     -elevated inflammatory markers on op labs and with diffuse abd sxs, recommend CT Abd/pelvis for eval of intra-abd pathology   -hypoxic and tachycardiac with inflammatory state, rec CTA to r/o PE and LE doppler to r/o DVTs   -check TTE ECHO for LV/RV function   -advanced care planning was discussed with patient and family.  Advanced care planning forms were reviewed and discussed.    Alexandro Bennett MD FAC  Attending Cardiologist, [facility].   Avaliable on [employer] Team

## 2025-03-07 NOTE — ED PROVIDER NOTE - DIFFERENTIAL DIAGNOSIS
Never Differential Diagnosis Ddx includes, however, is not limited to: PE, ACS, PNA, malignancy, esophagitis, ohter

## 2025-03-07 NOTE — ED PROVIDER NOTE - CLINICAL SUMMARY MEDICAL DECISION MAKING FREE TEXT BOX
64 y.o. F, PMHx significant for HTN, sent in by Dr. Lyman after patient became tachycardic, hypoxemic to 90 with chest pain and shortness of breath. Physical exam only notable for right lower extremity swelling as compared to the left without pitting. Given HPI, concerning differentials include but not limited to ACS, angina, pulmonary embolism, and tumor given reported 30 pound weight loss over the last several weeks.  After discussion with patient's PCP will obtain CT imaging of the chest, abdomen and pelvis to evaluate for possible tumor as well as PE.  Otherwise will obtain screening labs to evaluate for signs of anemia, leukocytosis.  Will obtain blood gas as patient does reports occasional shortness of breath on exertion.  Further steps in management pending initial workup.  Disposition: to be admitted for further cardiac workup in the setting of heart palpitations, tachycardia 64 y.o. F, PMHx significant for HTN, sent in by Dr. Lyman after patient became tachycardic, hypoxemic to 90 with chest pain and shortness of breath. Physical exam only notable for right lower extremity swelling as compared to the left without pitting. Given HPI, concerning differentials include but not limited to ACS, angina, pulmonary embolism, and tumor given reported 30 pound weight loss over the last several weeks.  After discussion with patient's PCP will obtain CT imaging of the chest, abdomen and pelvis to evaluate for possible tumor as well as PE.  Otherwise will obtain screening labs to evaluate for signs of anemia, leukocytosis.  Will obtain blood gas as patient does reports occasional shortness of breath on exertion.  Further steps in management pending initial workup.  Disposition: to be admitted for further cardiac workup in the setting of heart palpitations, tachycardia    AUSTIN Navarrete MD: Agree with resident/ACP MDM, assessment and plan as above.

## 2025-03-07 NOTE — ED PROVIDER NOTE - PROGRESS NOTE DETAILS
Laboratory work notable for leukocytosis: 14.21 D-dimer elevated at 629 with proBNP of 318.  At this time, patient pending results of CT abdomen pelvis and CT angio chest for PE rule out.  Further steps in management pending results of imaging. spoke to Dr. Lyman who would like patient to have CT imaging to rule out PE, ACS workup as well as CT imaging to evaluate for possible tumor in the chest and abdomen.  Given ongoing symptoms would also like patient to be admitted to hospitalist for further workup of cardiac etiology in setting of ongoing palpitations and chest pain. [place], DO (PGY2): Patient signed out to me at shift change.  Briefly, she is a 64-year-old female, with a history of hypertension, iron deficiency anemia, esophageal candidiasis on fluconazole, who was sent in by PCP for tachycardia and hypoxia in the office.  Patient also with chest pain and shortness of breath.  Labs notable for leukocytosis to 14.  D-dimer elevated.  Delta Trop negative.  No lactate.  Patient pending CT results. [place] (PGY2): Case discussed with hospitalist. Patient admitted.

## 2025-03-07 NOTE — ED ADULT NURSE NOTE - NSFALLUNIVINTERV_ED_ALL_ED
Bed/Stretcher in lowest position, wheels locked, appropriate side rails in place/Call bell, personal items and telephone in reach/Instruct patient to call for assistance before getting out of bed/chair/stretcher/Non-slip footwear applied when patient is off stretcher/[place] to call system/Physically safe environment - no spills, clutter or unnecessary equipment/Purposeful proactive rounding/Room/bathroom lighting operational, light cord in reach

## 2025-03-07 NOTE — ED PROVIDER NOTE - PHYSICAL EXAMINATION
GEN: Patient awake and alert. No acute distress, non-toxic.  Head: normocephalic, atraumatic  Neck: Nontender, full ROM  Eyes: EOMI,  CARDIAC: RRR.  No murmur. RLE >LLE peripheral edema noted.  PULM: CTA B/L no wheeze, rales or rhonchi. No signs of respiratory distress, no accessory muscle usage or nasal flaring.  ABD: Soft, nontender, nondistended. No rebound, no involuntary guarding.  : No CVA tenderness, no suprapubic tenderness.  MSK: Moving all extremities spontaneously.  NEURO: A&Ox3,  SKIN: Warm, dry

## 2025-03-08 DIAGNOSIS — R63.4 ABNORMAL WEIGHT LOSS: ICD-10-CM

## 2025-03-08 DIAGNOSIS — R65.10 SYSTEMIC INFLAMMATORY RESPONSE SYNDROME (SIRS) OF NON-INFECTIOUS ORIGIN WITHOUT ACUTE ORGAN DYSFUNCTION: ICD-10-CM

## 2025-03-08 DIAGNOSIS — R13.10 DYSPHAGIA, UNSPECIFIED: ICD-10-CM

## 2025-03-08 DIAGNOSIS — R09.89 OTHER SPECIFIED SYMPTOMS AND SIGNS INVOLVING THE CIRCULATORY AND RESPIRATORY SYSTEMS: ICD-10-CM

## 2025-03-08 DIAGNOSIS — I10 ESSENTIAL (PRIMARY) HYPERTENSION: ICD-10-CM

## 2025-03-08 DIAGNOSIS — D64.9 ANEMIA, UNSPECIFIED: ICD-10-CM

## 2025-03-08 LAB
ALBUMIN SERPL ELPH-MCNC: 2.6 G/DL — LOW (ref 3.3–5)
ALP SERPL-CCNC: 56 U/L — SIGNIFICANT CHANGE UP (ref 40–120)
ALT FLD-CCNC: 13 U/L — SIGNIFICANT CHANGE UP (ref 10–45)
ANION GAP SERPL CALC-SCNC: 15 MMOL/L — SIGNIFICANT CHANGE UP (ref 5–17)
APTT BLD: 28 SEC — SIGNIFICANT CHANGE UP (ref 24.5–35.6)
APTT BLD: 28.1 SEC — SIGNIFICANT CHANGE UP (ref 24.5–35.6)
AST SERPL-CCNC: 18 U/L — SIGNIFICANT CHANGE UP (ref 10–40)
B2 GLYCOPROT1 IGA SER QL: <2 U/ML — SIGNIFICANT CHANGE UP
B2 GLYCOPROT1 IGG SER-ACNC: <1.4 U/ML — SIGNIFICANT CHANGE UP
B2 GLYCOPROT1 IGM SER-ACNC: <1.5 U/ML — SIGNIFICANT CHANGE UP
BASOPHILS # BLD AUTO: 0.03 K/UL — SIGNIFICANT CHANGE UP (ref 0–0.2)
BASOPHILS NFR BLD AUTO: 0.2 % — SIGNIFICANT CHANGE UP (ref 0–2)
BILIRUB SERPL-MCNC: 0.2 MG/DL — SIGNIFICANT CHANGE UP (ref 0.2–1.2)
BLD GP AB SCN SERPL QL: NEGATIVE — SIGNIFICANT CHANGE UP
BUN SERPL-MCNC: 10 MG/DL — SIGNIFICANT CHANGE UP (ref 7–23)
CALCIUM SERPL-MCNC: 8.7 MG/DL — SIGNIFICANT CHANGE UP (ref 8.4–10.5)
CARDIOLIPIN IGM SER-MCNC: <1.5 MPL U/ML — SIGNIFICANT CHANGE UP
CARDIOLIPIN IGM SER-MCNC: <1.6 GPL U/ML — SIGNIFICANT CHANGE UP
CHLORIDE SERPL-SCNC: 98 MMOL/L — SIGNIFICANT CHANGE UP (ref 96–108)
CO2 SERPL-SCNC: 23 MMOL/L — SIGNIFICANT CHANGE UP (ref 22–31)
CREAT SERPL-MCNC: 0.52 MG/DL — SIGNIFICANT CHANGE UP (ref 0.5–1.3)
CRP SERPL-MCNC: 118 MG/L — HIGH (ref 0–4)
DEPRECATED CARDIOLIPIN IGA SER: <2 APL U/ML — SIGNIFICANT CHANGE UP
DSDNA AB SER-ACNC: <1 IU/ML — SIGNIFICANT CHANGE UP
EGFR: 104 ML/MIN/1.73M2 — SIGNIFICANT CHANGE UP
EGFR: 104 ML/MIN/1.73M2 — SIGNIFICANT CHANGE UP
EOSINOPHIL # BLD AUTO: 0.12 K/UL — SIGNIFICANT CHANGE UP (ref 0–0.5)
EOSINOPHIL NFR BLD AUTO: 0.8 % — SIGNIFICANT CHANGE UP (ref 0–6)
FERRITIN SERPL-MCNC: 1179 NG/ML — HIGH (ref 13–330)
GLUCOSE SERPL-MCNC: 94 MG/DL — SIGNIFICANT CHANGE UP (ref 70–99)
HCT VFR BLD CALC: 28.5 % — LOW (ref 34.5–45)
HGB BLD-MCNC: 8.9 G/DL — LOW (ref 11.5–15.5)
IMM GRANULOCYTES NFR BLD AUTO: 2.2 % — HIGH (ref 0–0.9)
INR BLD: 1.21 RATIO — HIGH (ref 0.85–1.16)
IRON SATN MFR SERPL: 14 % — SIGNIFICANT CHANGE UP (ref 14–50)
IRON SATN MFR SERPL: 24 UG/DL — LOW (ref 30–160)
LDH SERPL L TO P-CCNC: 239 U/L — SIGNIFICANT CHANGE UP (ref 50–242)
LYMPHOCYTES # BLD AUTO: 0.92 K/UL — LOW (ref 1–3.3)
LYMPHOCYTES # BLD AUTO: 6.1 % — LOW (ref 13–44)
MAGNESIUM SERPL-MCNC: 1.9 MG/DL — SIGNIFICANT CHANGE UP (ref 1.6–2.6)
MCHC RBC-ENTMCNC: 23.1 PG — LOW (ref 27–34)
MCHC RBC-ENTMCNC: 31.2 G/DL — LOW (ref 32–36)
MCV RBC AUTO: 73.8 FL — LOW (ref 80–100)
MONOCYTES # BLD AUTO: 0.28 K/UL — SIGNIFICANT CHANGE UP (ref 0–0.9)
MONOCYTES NFR BLD AUTO: 1.9 % — LOW (ref 2–14)
NEUTROPHILS # BLD AUTO: 13.39 K/UL — HIGH (ref 1.8–7.4)
NEUTROPHILS NFR BLD AUTO: 88.8 % — HIGH (ref 43–77)
NRBC BLD AUTO-RTO: 0 /100 WBCS — SIGNIFICANT CHANGE UP (ref 0–0)
PHOSPHATE SERPL-MCNC: 2.3 MG/DL — LOW (ref 2.5–4.5)
PLATELET # BLD AUTO: 309 K/UL — SIGNIFICANT CHANGE UP (ref 150–400)
POTASSIUM SERPL-MCNC: 4.1 MMOL/L — SIGNIFICANT CHANGE UP (ref 3.5–5.3)
POTASSIUM SERPL-SCNC: 4.1 MMOL/L — SIGNIFICANT CHANGE UP (ref 3.5–5.3)
PROT SERPL-MCNC: 7 G/DL — SIGNIFICANT CHANGE UP (ref 6–8.3)
PROTHROM AB SERPL-ACNC: 13.9 SEC — HIGH (ref 9.9–13.4)
RBC # BLD: 3.86 M/UL — SIGNIFICANT CHANGE UP (ref 3.8–5.2)
RBC # BLD: 3.86 M/UL — SIGNIFICANT CHANGE UP (ref 3.8–5.2)
RBC # FLD: 17 % — HIGH (ref 10.3–14.5)
RETICS #: 58.3 K/UL — SIGNIFICANT CHANGE UP (ref 25–125)
RETICS/RBC NFR: 1.5 % — SIGNIFICANT CHANGE UP (ref 0.5–2.5)
RH IG SCN BLD-IMP: POSITIVE — SIGNIFICANT CHANGE UP
SODIUM SERPL-SCNC: 136 MMOL/L — SIGNIFICANT CHANGE UP (ref 135–145)
TIBC SERPL-MCNC: 174 UG/DL — LOW (ref 220–430)
TROPONIN T, HIGH SENSITIVITY RESULT: 15 NG/L — SIGNIFICANT CHANGE UP (ref 0–51)
TSH SERPL-MCNC: 1.61 UIU/ML — SIGNIFICANT CHANGE UP (ref 0.27–4.2)
TSH SERPL-MCNC: 2.07 UIU/ML — SIGNIFICANT CHANGE UP (ref 0.27–4.2)
UIBC SERPL-MCNC: 150 UG/DL — SIGNIFICANT CHANGE UP (ref 110–370)
VIT B12 SERPL-MCNC: 1897 PG/ML — HIGH (ref 232–1245)
WBC # BLD: 15.07 K/UL — HIGH (ref 3.8–10.5)
WBC # FLD AUTO: 15.07 K/UL — HIGH (ref 3.8–10.5)

## 2025-03-08 PROCEDURE — 99223 1ST HOSP IP/OBS HIGH 75: CPT

## 2025-03-08 PROCEDURE — 99222 1ST HOSP IP/OBS MODERATE 55: CPT | Mod: GC

## 2025-03-08 PROCEDURE — 99232 SBSQ HOSP IP/OBS MODERATE 35: CPT

## 2025-03-08 RX ORDER — ACETAMINOPHEN 500 MG/5ML
1000 LIQUID (ML) ORAL ONCE
Refills: 0 | Status: COMPLETED | OUTPATIENT
Start: 2025-03-08 | End: 2025-03-08

## 2025-03-08 RX ORDER — ACETAMINOPHEN 500 MG/5ML
650 LIQUID (ML) ORAL EVERY 6 HOURS
Refills: 0 | Status: DISCONTINUED | OUTPATIENT
Start: 2025-03-08 | End: 2025-03-11

## 2025-03-08 RX ORDER — INFLUENZA A VIRUS A/IDAHO/07/2018 (H1N1) ANTIGEN (MDCK CELL DERIVED, PROPIOLACTONE INACTIVATED, INFLUENZA A VIRUS A/INDIANA/08/2018 (H3N2) ANTIGEN (MDCK CELL DERIVED, PROPIOLACTONE INACTIVATED), INFLUENZA B VIRUS B/SINGAPORE/INFTT-16-0610/2016 ANTIGEN (MDCK CELL DERIVED, PROPIOLACTONE INACTIVATED), INFLUENZA B VIRUS B/IOWA/06/2017 ANTIGEN (MDCK CELL DERIVED, PROPIOLACTONE INACTIVATED) 15; 15; 15; 15 UG/.5ML; UG/.5ML; UG/.5ML; UG/.5ML
0.5 INJECTION, SUSPENSION INTRAMUSCULAR ONCE
Refills: 0 | Status: DISCONTINUED | OUTPATIENT
Start: 2025-03-08 | End: 2025-03-11

## 2025-03-08 RX ORDER — SODIUM CHLORIDE 9 G/1000ML
1000 INJECTION, SOLUTION INTRAVENOUS
Refills: 0 | Status: DISCONTINUED | OUTPATIENT
Start: 2025-03-08 | End: 2025-03-11

## 2025-03-08 RX ADMIN — Medication 100 MILLIEQUIVALENT(S): at 06:31

## 2025-03-08 RX ADMIN — Medication 100 MILLIEQUIVALENT(S): at 05:23

## 2025-03-08 RX ADMIN — SODIUM CHLORIDE 200 MILLILITER(S): 9 INJECTION, SOLUTION INTRAVENOUS at 04:11

## 2025-03-08 RX ADMIN — Medication 400 MILLIGRAM(S): at 05:23

## 2025-03-08 RX ADMIN — Medication 1000 MILLIGRAM(S): at 05:53

## 2025-03-08 RX ADMIN — Medication 100 MILLIEQUIVALENT(S): at 04:12

## 2025-03-08 NOTE — H&P ADULT - NSHPSOCIALHISTORY_GEN_ALL_CORE
Social History:    Marital Status: (  ) , ( x ) Single, (  ) , (  ) , (  )   # of Children: 0  Lives with: (x  ) alone, (  ) children, (  ) spouse, (  ) parents, (  ) siblings, (  ) friends, (  ) other:   Occupation:     Substance Use/Illicit Drugs: (  ) never used vs other:   Tobacco Usage: ( x ) never smoked, (  ) former smoker, (  ) current smoker and Total Pack-Years:   Last Alcohol Usage/Frequency/Amount/Withdrawal/Hx of Abuse:  none  Foreign travel:   Animal exposure:

## 2025-03-08 NOTE — H&P ADULT - NSHPLABSRESULTS_GEN_ALL_CORE
Personally reviewed old records.  Personally reviewed labs.  Personally reviewed imaging.  Personally reviewed EKG. NSR rate 100 Ant fascic block. No ST or abnl tw changes                          8.0    14.20 )-----------( 282      ( 07 Mar 2025 17:24 )             25.7       03-07    134[L]  |  97  |  14  ----------------------------<  97  3.5   |  24  |  0.51    Ca    8.9      07 Mar 2025 17:24  Mg     2.0     03-07    TPro  6.9  /  Alb  2.9[L]  /  TBili  0.3  /  DBili  x   /  AST  15  /  ALT  10  /  AlkPhos  49  03-07            LIVER FUNCTIONS - ( 07 Mar 2025 17:24 )  Alb: 2.9 g/dL / Pro: 6.9 g/dL / ALK PHOS: 49 U/L / ALT: 10 U/L / AST: 15 U/L / GGT: x                 Urinalysis Basic - ( 07 Mar 2025 17:50 )    Color: Yellow / Appearance: Clear / S.025 / pH: x  Gluc: x / Ketone: 15 mg/dL  / Bili: Negative / Urobili: 1.0 mg/dL   Blood: x / Protein: 30 mg/dL / Nitrite: Negative   Leuk Esterase: Negative / RBC: 2 /HPF / WBC 1 /HPF   Sq Epi: x / Non Sq Epi: 3 /HPF / Bacteria: Negative /HPF

## 2025-03-08 NOTE — PATIENT PROFILE ADULT - FALL HARM RISK - UNIVERSAL INTERVENTIONS
Bed in lowest position, wheels locked, appropriate side rails in place/Call bell, personal items and telephone in reach/Instruct patient to call for assistance before getting out of bed or chair/Non-slip footwear when patient is out of bed/[place] to call system/Physically safe environment - no spills, clutter or unnecessary equipment/Purposeful Proactive Rounding/Room/bathroom lighting operational, light cord in reach

## 2025-03-08 NOTE — H&P ADULT - HISTORY OF PRESENT ILLNESS
64F estate planning , c hx HTN, MAURICIO (last IV iron 1 mo ago), elevated NORMA 1:1280 of unknown etiol, recent PNA (Jan '25) c/b subsequent odynophagia, sinus tachycardia, presents from PMD's office with odynophagia, cough, LAW, weight loss.    Pt reports 2-3 months of odynophagia, pain at back of throat and upper chest, whenever she swallows solids/liquids, which has prevented her from eating or drinking much. Reports 30 lb weight loss over the past 2 months, along with dehydration. Last EGD/cscope was in 2011. Pt went to see an ENT doctor who scoped the back of throat, and told her she has "infection" back there, and gave her 21 days of diflucan, which she completed, which did not resolve her pain, unclear if it improved her symptoms.  In Jan '25, pt had "PNA" for which she was given azithro, ?augmentin, 5 days of steroids. While she was taking these medications, pt had one episode of , for which she went to the hospital. Her HR did not go as high as that subsequently, but since then, she has had HR in the 90s-110s persistently, which is higher than her normal baseline of 60s. Other symptom is 1 month of cough that brings up white sputum, and LAW.  Denies chest pain, fevers, chills, diarrhea, urinary symptoms. Reports intermittent "abdominal cramping". Last reports she had IV iron infusion 1 month ago at outpt office. Denies night sweats.  Pt reports having b/l LE duplex a few weeks ago. TTE Dec '24 showing EF 73% grossly normal heart function. barium esophagram on 3/5/25 @ [facility]. last stress test was in 2021.

## 2025-03-08 NOTE — CONSULT NOTE ADULT - ASSESSMENT
63 yo F with PMH of HTN, MAURICIO, and recent pneumonia presenting for odynophagia, dysphagia, dyspnea, and weakness for 2 months, found to have elevated CRP, microcytic anemia, and positive NORMA c/f systemic process.     Impression:  #Odynophagia, Dysphagia  #Iron Deficiency Anemia  #Myalgias dyspnea, weakness  #Positive inflammatory markers    P/w systemic symptoms for 2-3 months with dyspnea, tachycardia, myalgias and weakness. She endorses dysphagia to solid>liquids and odynophagia with any swallowing even saliva for the same time period. On admission, pt found to have low grade temp to 100.2 and mild tachycardia with wbc 14.2. Labs notable for Hgb 8 (prior baseline 10.4 1/2025), MCV 73.2, , proteinuria, and albumin 2.9.  Workup at OSH notable for NORMA 1:1280. Barium esophagram at [facility] on 3/5 showed a few tertiary contractions commiserate with age and no stricture or mucosal abnormality. 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 Rheumatological disorders, such as scleroderma, Sjogren's syndrome, systemic lupus erythematosus, rheumatoid arthritis, sarcoidosis, Behcet's disease, anti-neutrophil cytoplasmic antibody (ANCA)-associated vasculitis, or granulomatosis with polyangiitis, have been associated with dysphagia. Dermatomyositis and polymyositis also on ddx. Structural cause such as adenocarcinoma also possible though systemic cause more likely given pt's symptoms. Thyroid disorder also on ddx. 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 Her last EGD/colonoscopy was 2011 which showed gastritis and hemorrhoids but she has not had repeat evaluation since she was found to have MAURICIO. Iron deficiency anemia raises c/f chronic blood loss due to GI source, warranting evaluation. Pt currently hemodynamically stable in no distress    - Recommend rheumatology consult  - Plan for EGD +/- endoflip Monday  - Warrants colonoscopy for MAURICIO, discussed w/ pt and will defer to outpatient setting due to odynophagia limiting ability to tolerate prep  - Soft, bite sized diet  - Send NORMA, ANCA, RF, TSH, C3, C4, anti-Jo1, anti-centromere, anti-Scl70, anti-SSA, anti-SSB  - F/u iron studies and ESR    All recommendations are tentative until note is attested by attending.     Elena Douglass, PGY4  Gastroenterology/Hepatology Fellow  Available on [employer] Teams  [phone] (Long Range Pager)  [phone] (Short Range Pager [facility])    After 5 pm, please contact the on-call GI fellow for any urgent issues via the Hospital Call      65 yo F with PMH of HTN, MAURICIO, and recent pneumonia presenting for odynophagia, dysphagia, dyspnea, and weakness for 2 months, found to have elevated CRP, microcytic anemia, and positive NORMA c/f systemic process.     Impression:  #Odynophagia, Dysphagia  #Iron Deficiency Anemia  #Myalgias dyspnea, weakness  #Positive inflammatory markers    P/w systemic symptoms for 2-3 months with dyspnea, tachycardia, myalgias and weakness. She endorses dysphagia to solid>liquids and odynophagia with any swallowing even saliva for the same time period. On admission, pt found to have low grade temp to 100.2 and mild tachycardia with wbc 14.2. Labs notable for Hgb 8 (prior baseline 10.4 1/2025), MCV 73.2, , proteinuria, and albumin 2.9.  Workup at OSH notable for NORMA 1:1280. Barium esophagram at [facility] on 3/5 showed a few tertiary contractions commiserate with age and no stricture or mucosal abnormality. 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 Rheumatological disorders, such as scleroderma, Sjogren's syndrome, systemic lupus erythematosus, rheumatoid arthritis, sarcoidosis, Behcet's disease, anti-neutrophil cytoplasmic antibody (ANCA)-associated vasculitis, or granulomatosis with polyangiitis, have been associated with dysphagia. Dermatomyositis and polymyositis also on ddx. Structural cause such as adenocarcinoma also possible though systemic cause more likely given pt's symptoms. Thyroid disorder also on ddx. 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 Her last EGD/colonoscopy was 2011 which showed gastritis and hemorrhoids but she has not had repeat evaluation since she was found to have MAURICIO. Iron deficiency anemia raises c/f chronic blood loss due to GI source, warranting evaluation. Pt currently hemodynamically stable in no distress    - Recommend rheumatology consult  - Plan for EGD +/- endoflip Monday  - Keep NPO MN on day procedure  - Send early am preop labs on day of procedure (cbc, bmp, coags, T&S)  - Warrants colonoscopy for MAURICIO, discussed w/ pt and will defer to outpatient setting due to odynophagia limiting ability to tolerate prep  - Soft, bite sized diet  - Send NORMA, ANCA, RF, TSH, C3, C4, anti-Jo1, anti-centromere, anti-Scl70, anti-SSA, anti-SSB  - F/u iron studies and ESR    All recommendations are tentative until note is attested by attending.     Elena Douglass, PGY4  Gastroenterology/Hepatology Fellow  Available on [employer] Teams  [phone] (Long Range Pager)  [phone] (Short Range Pager [facility])    After 5 pm, please contact the on-call GI fellow for any urgent issues via the Hospital Call

## 2025-03-08 NOTE — H&P ADULT - ASSESSMENT
64F estate planning , c hx HTN, MAURICIO (last IV iron 1 mo ago), elevated NORMA 1:1280 of unknown etiol, recent PNA (Jan '25) c/b subsequent odynophagia, sinus tachycardia, presents from PMD's office with odynophagia, cough, LAW, weight loss.

## 2025-03-08 NOTE — ED ADULT NURSE REASSESSMENT NOTE - NS ED NURSE REASSESS COMMENT FT1
As per MD [place], patient is okay to eat. Patient given chicken sandwich and crackers. Safety and comfort provided.
VIKTOR Marie contacted at #[phone] for off tele to unit. As per VIKTOR Marie, patient okay to go to unit off tele. Safety and comfort provided.
Received report from HAO [place]. Patient a/ox4, breathing spontaneously. Patient denies nausea, vomiting, pain, chest pain and dyspnea. Safety and comfort provided.

## 2025-03-08 NOTE — PROGRESS NOTE ADULT - SUBJECTIVE AND OBJECTIVE BOX
[facility] Attending Follow-up Note     Patient seen and examined at bedside.    Overnight Events:     events noted     REVIEW OF SYSTEMS:  Constitutional:     [x ] negative [ ] fevers [ ] chills [ ] weight loss [ ] weight gain  HEENT:                  [x ] negative [ ] dry eyes [ ] eye irritation [ ] postnasal drip [ ] nasal congestion  CV:                         [ x] negative  [ ] chest pain [ ] orthopnea [ ] palpitations [ ] murmur  Resp:                     [ x] negative [ ] cough [ ] shortness of breath [ ] dyspnea [ ] wheezing [ ] sputum [ ]hemoptysis  GI:                          [ x] negative [ ] nausea [ ] vomiting [ ] diarrhea [ ] constipation [ ] abd pain [ ] dysphagia   :                        [ x] negative [ ] dysuria [ ] nocturia [ ] hematuria [ ] increased urinary frequency  Musculoskeletal: [ x] negative [ ] back pain [ ] myalgias [ ] arthralgias [ ] fracture  Skin:                       [ x] negative [ ] rash [ ] itch  Neurological:        [x ] negative [ ] headache [ ] dizziness [ ] syncope [ ] weakness [ ] numbness  Psychiatric:           [ x] negative [ ] anxiety [ ] depression  Endocrine:            [ x] negative [ ] diabetes [ ] thyroid problem  Heme/Lymph:      [ x] negative [ ] anemia [ ] bleeding problem  Allergic/Immune: [ x] negative [ ] itchy eyes [ ] nasal discharge [ ] hives [ ] angioedema    [ x] All other systems negative  [ ] Unable to assess ROS due to    Current Meds:  acetaminophen     Tablet .. 650 milliGRAM(s) Oral every 6 hours PRN  influenza   Vaccine 0.5 milliLiter(s) IntraMuscular once  lactated ringers. 1000 milliLiter(s) IV Continuous <Continuous>      PAST MEDICAL & SURGICAL HISTORY:  HTN (hypertension)      GERD (gastroesophageal reflux disease)      S/P BRENT (total abdominal hysterectomy)      S/P breast biopsy  ?cyst removal          Vitals:  T(F): 97.4 (03-09), Max: 99.4 (03-09)  HR: 115 (03-09) (97 - 115)  BP: 124/74 (03-09) (117/71 - 126/72)  RR: 18 (03-09)  SpO2: 96% (03-09)  I&O's Summary    08 Mar 2025 06:01  -  09 Mar 2025 07:00  --------------------------------------------------------  IN: 870 mL / OUT: 0 mL / NET: 870 mL    09 Mar 2025 07:01  -  10 Mar 2025 00:44  --------------------------------------------------------  IN: 700 mL / OUT: 0 mL / NET: 700 mL        Physical Exam:  Appearance: No acute distress  HENT: No JVD   Cardiovascular: RRR, S1/S2, no murmurs  Respiratory: CTABL  Gastrointestinal: soft, NT ND, +BS  Musculoskeletal: No clubbing, no edema   Neurologic: Non-focal  Skin: No rashes, ecchymoses, or cyanosis                          7.9    10.29 )-----------( 308      ( 09 Mar 2025 06:41 )             24.5     03-09    136  |  100  |  6[L]  ----------------------------<  88  3.8   |  24  |  0.48[L]    Ca    8.5      09 Mar 2025 06:41  Phos  2.3     03-08  Mg     1.9     03-08    TPro  7.0  /  Alb  2.6[L]  /  TBili  0.2  /  DBili  x   /  AST  18  /  ALT  13  /  AlkPhos  56  03-08    PT/INR - ( 08 Mar 2025 05:46 )   PT: 13.9 sec;   INR: 1.21 ratio         PTT - ( 08 Mar 2025 05:47 )  PTT:28.0 sec              Cardiovascular Testings:

## 2025-03-08 NOTE — CONSULT NOTE ADULT - SUBJECTIVE AND OBJECTIVE BOX
Chief Complaint: odynophagia    HPI:  Alexander Major is a 63 yo F with PMH of HTN, MAURICIO, and recent pneumonia presenting for odynophagia, dysphagia, dyspnea, and weakness for 2 months. She endorses being in usual state of health until January. She endorses pain with swallowing even saliva for the past 2-3 months. She describes throughout entire swallow in her throat and down to her chest. She endorses difficulty swallowing with sensation of tightness and difficulty passing food with more trouble with solids>liquids. She endorses dyspnea with minimal exertion, tachycardia, and diffuse myalgias and muscle weakness over the same time period. She endorses 30 lb weight loss since end of November. She endorses subjective chills but has not noticed any fevers or lymphadenopathy. She also notes right eye dropping this past week.     Patient seen by ENT doctor for her symptoms who evaluated her throat and prescribed 21 days of diflucan with no improvement in her symptoms. She was prescribed treatment for pneumonia in January and treated with azithromycin and 5 day course of steroids. She reports complete resolution of all her symptoms including dysphagia and odynophagia with steroid treatment. She had barium esophagram at [facility] which showed a few tertiary contractions commiserate with age and no stricture or mucosal abnormality. Prior labs notable for microcytic anemia since 2021. Prior iron studies 2/9/2025 show iron 12, tibc 213, %sat 6, ferritin 252. Workup notable for NORMA 1:1280 and CRP>20 per outside hospital labs. Pt reports receiving iron infusion recently with her PCP. Her last EGD/colonoscopy was 2011 which showed gastritis and hemorrhoids but she has not had repeat evaluation since she was found to have MAURICIO.    On admission, T 98.3->100.2, bp 117/73, , RR 18, SpO2 99%. Labs notable for wbc 14.2, Hgb 8, MCV 73.2, plt 282, INR 1.21, Na 134, cr 0.51, albumin 2.9, TBili 0.3, ALP 49, AST 15, ALT 10, , UA +proteinuria. CT c/a/p shows borderline heart size and bibasilar atelectasis.     Allergies:  NSAIDs (Hives)      Home Medications:  Norvasc 5 mg oral tablet: 1 tab(s) orally once a day (08 Mar 2025 03:18)    Hospital Medications:  influenza   Vaccine 0.5 milliLiter(s) IntraMuscular once  lactated ringers. 1000 milliLiter(s) IV Continuous <Continuous>      PMHX/PSHX:  HTN (hypertension)    GERD (gastroesophageal reflux disease)    S/P BRENT (total abdominal hysterectomy)    S/P breast biopsy        Family history:      Denies family history of colon cancer/polyps, stomach cancer/polyps, pancreatic cancer/masses, liver cancer/disease, ovarian cancer and endometrial cancer.    Social History:     Tob: Denies  EtOH: As above  Illicit Drugs: Denies    ROS:   General:  +wt loss, no fevers, +chills, no night sweats, +fatigue  Eyes:  Good vision, no reported pain  ENT:  No sore throat, pain, runny nose, dysphagia  CV:  No pain, palpitations, hypo/hypertension  Pulm:  No dyspnea, cough, tachypnea, wheezing  GI:  As per HPI  :  No pain, bleeding, incontinence, nocturia  Muscle:  +pain, weakness  Neuro:  +weakness, no tingling, memory problems  Psych:  No fatigue, insomnia, mood problems, depression  Endocrine:  No polyuria, polydipsia, cold/heat intolerance  Heme:  No petechiae, ecchymosis, easy bruisability  Skin:  No rash, tattoos, scars, edema    PHYSICAL EXAM:   GENERAL:  No acute distress  HEENT:  Normocephalic/atraumatic, no scleral icterus  CHEST:  No accessory muscle use  HEART:  Regular rate and rhythm  ABDOMEN:  Soft, non-tender, non-distended, normoactive bowel sounds,  no masses, no hepato-splenomegaly, no signs of chronic liver disease  EXTREMITIES: No cyanosis, clubbing, or edema  SKIN:  No rash  NEURO:  Alert and oriented x 3, no asterixis    Vital Signs:  Vital Signs Last 24 Hrs  T(C): 36.7 (08 Mar 2025 11:44), Max: 37.9 (08 Mar 2025 04:00)  T(F): 98 (08 Mar 2025 11:44), Max: 100.2 (08 Mar 2025 04:00)  HR: 110 (08 Mar 2025 11:44) (94 - 111)  BP: 126/72 (08 Mar 2025 11:44) (116/58 - 132/78)  BP(mean): 80 (08 Mar 2025 01:35) (80 - 84)  RR: 18 (08 Mar 2025 11:44) (18 - 20)  SpO2: 99% (08 Mar 2025 11:44) (98% - 100%)    Parameters below as of 08 Mar 2025 11:44  Patient On (Oxygen Delivery Method): room air      Daily Height in cm: 157.48 (07 Mar 2025 15:30)    Daily     LABS:                        8.9    15.07 )-----------( 309      ( 08 Mar 2025 05:46 )             28.5     Mean Cell Volume: 73.8 fl (03-08-25 @ 05:46)    03-08    136  |  98  |  10  ----------------------------<  94  4.1   |  23  |  0.52    Ca    8.7      08 Mar 2025 05:44  Phos  2.3     03-08  Mg     1.9     03-08    TPro  7.0  /  Alb  2.6[L]  /  TBili  0.2  /  DBili  x   /  AST  18  /  ALT  13  /  AlkPhos  56  03-08    LIVER FUNCTIONS - ( 08 Mar 2025 05:44 )  Alb: 2.6 g/dL / Pro: 7.0 g/dL / ALK PHOS: 56 U/L / ALT: 13 U/L / AST: 18 U/L / GGT: x           PT/INR - ( 08 Mar 2025 05:46 )   PT: 13.9 sec;   INR: 1.21 ratio         PTT - ( 08 Mar 2025 05:46 )  PTT:28.1 sec  Urinalysis Basic - ( 08 Mar 2025 05:44 )    Color: x / Appearance: x / SG: x / pH: x  Gluc: 94 mg/dL / Ketone: x  / Bili: x / Urobili: x   Blood: x / Protein: x / Nitrite: x   Leuk Esterase: x / RBC: x / WBC x   Sq Epi: x / Non Sq Epi: x / Bacteria: x      Amylase Serum--      Lipase serum14       Ammonia--                          8.9    15.07 )-----------( 309      ( 08 Mar 2025 05:46 )             28.5                         8.0    14.20 )-----------( 282      ( 07 Mar 2025 17:24 )             25.7       Imaging:  CT c/a/p  No abdominal pathology

## 2025-03-08 NOTE — H&P ADULT - NSHPPHYSICALEXAM_GEN_ALL_CORE
PHYSICAL EXAM:   GENERAL: Alert. Not confused. No acute distress. Not thin. Not cachectic. Not obese.  HEAD:  Atraumatic. Normocephalic.  EYES: EOMI. PERRLA. Normal conjunctiva/sclera.  ENT: Neck supple. No JVD. Moist oral mucosa. Not edentulous. No thrush.  LYMPH: Normal supraclavicular/cervical lymph nodes.   CARDIAC: +tachy, Not iraj. Regular rhythm. Not irregularly irregular. S1. S2. No murmur. No rub. No distant heart sounds.  LUNG/CHEST: CTAB. BS equal bilaterally. No wheezes. No rales. No rhonchi.  ABDOMEN: Soft. No tenderness. No distension. No fluid wave. Normal bowel sounds.  BACK: No midline/vertebral tenderness. No flank tenderness.  VASCULAR: +2 b/l radial or ulnar pulses. Palpable DP pulses.  EXTREMITIES:  No clubbing. No cyanosis. No edema. Moving all 4.  NEUROLOGY: A&Ox3. Non-focal exam. Cranial nerves intact. Normal speech. Sensation intact.  PSYCH: Normal behavior. Normal affect.  SKIN: No jaundice. No erythema. No rash/lesion.    Vital Signs Last 24 Hrs  T(C): 37.3 (08 Mar 2025 01:58), Max: 37.3 (08 Mar 2025 01:58)  T(F): 99.2 (08 Mar 2025 01:58), Max: 99.2 (08 Mar 2025 01:58)  HR: 100 (08 Mar 2025 01:58) (94 - 111)  BP: 132/78 (08 Mar 2025 01:58) (116/58 - 132/78)  BP(mean): 80 (08 Mar 2025 01:35) (80 - 84)  ABP: --  ABP(mean): --  RR: 18 (08 Mar 2025 01:58) (18 - 20)  SpO2: 98% (08 Mar 2025 01:58) (98% - 99%)    O2 Parameters below as of 08 Mar 2025 01:58  Patient On (Oxygen Delivery Method): room air          I&O's Summary

## 2025-03-08 NOTE — H&P ADULT - PROBLEM SELECTOR PLAN 2
- sent in by pmd for tachycardia of unclear etiol  - also has leukocytosis. last used steroids in Jan '25  - trend WBC  - monitor for fevers  - IVF  - replete K, mg    - last TTE in Dec '24 grossly normal  - will repeat TTE, repeat LE duplex given elevated ddimer  - observe off abx  - eventual ischemic eval

## 2025-03-09 LAB
ANION GAP SERPL CALC-SCNC: 12 MMOL/L — SIGNIFICANT CHANGE UP (ref 5–17)
AUTO DIFF PNL BLD: NEGATIVE — SIGNIFICANT CHANGE UP
BUN SERPL-MCNC: 6 MG/DL — LOW (ref 7–23)
C-ANCA SER-ACNC: NEGATIVE — SIGNIFICANT CHANGE UP
CALCIUM SERPL-MCNC: 8.5 MG/DL — SIGNIFICANT CHANGE UP (ref 8.4–10.5)
CCP IGG SERPL-ACNC: <8 U/ML — SIGNIFICANT CHANGE UP
CHLORIDE SERPL-SCNC: 100 MMOL/L — SIGNIFICANT CHANGE UP (ref 96–108)
CK SERPL-CCNC: 23 U/L — LOW (ref 25–170)
CO2 SERPL-SCNC: 24 MMOL/L — SIGNIFICANT CHANGE UP (ref 22–31)
CREAT SERPL-MCNC: 0.48 MG/DL — LOW (ref 0.5–1.3)
EGFR: 106 ML/MIN/1.73M2 — SIGNIFICANT CHANGE UP
EGFR: 106 ML/MIN/1.73M2 — SIGNIFICANT CHANGE UP
ERYTHROCYTE [SEDIMENTATION RATE] IN BLOOD: 120 MM/HR — HIGH (ref 0–20)
GLUCOSE SERPL-MCNC: 88 MG/DL — SIGNIFICANT CHANGE UP (ref 70–99)
HCT VFR BLD CALC: 24.5 % — LOW (ref 34.5–45)
HGB BLD-MCNC: 7.9 G/DL — LOW (ref 11.5–15.5)
MCHC RBC-ENTMCNC: 23 PG — LOW (ref 27–34)
MCHC RBC-ENTMCNC: 32.2 G/DL — SIGNIFICANT CHANGE UP (ref 32–36)
MCV RBC AUTO: 71.4 FL — LOW (ref 80–100)
MPO AB + PR3 PNL SER: SIGNIFICANT CHANGE UP
NRBC BLD AUTO-RTO: 0 /100 WBCS — SIGNIFICANT CHANGE UP (ref 0–0)
P-ANCA SER-ACNC: NEGATIVE — SIGNIFICANT CHANGE UP
PLATELET # BLD AUTO: 308 K/UL — SIGNIFICANT CHANGE UP (ref 150–400)
POTASSIUM SERPL-MCNC: 3.8 MMOL/L — SIGNIFICANT CHANGE UP (ref 3.5–5.3)
POTASSIUM SERPL-SCNC: 3.8 MMOL/L — SIGNIFICANT CHANGE UP (ref 3.5–5.3)
RBC # BLD: 3.43 M/UL — LOW (ref 3.8–5.2)
RBC # FLD: 16.7 % — HIGH (ref 10.3–14.5)
RF+CCP IGG SER-IMP: NEGATIVE — SIGNIFICANT CHANGE UP
RHEUMATOID FACT SERPL-ACNC: <10 IU/ML — SIGNIFICANT CHANGE UP (ref 0–13)
SODIUM SERPL-SCNC: 136 MMOL/L — SIGNIFICANT CHANGE UP (ref 135–145)
T3 SERPL-MCNC: 91 NG/DL — SIGNIFICANT CHANGE UP (ref 80–200)
T4 AB SER-ACNC: 5.6 UG/DL — SIGNIFICANT CHANGE UP (ref 4.6–12)
TSH SERPL-MCNC: 1.32 UIU/ML — SIGNIFICANT CHANGE UP (ref 0.27–4.2)
WBC # BLD: 10.29 K/UL — SIGNIFICANT CHANGE UP (ref 3.8–10.5)
WBC # FLD AUTO: 10.29 K/UL — SIGNIFICANT CHANGE UP (ref 3.8–10.5)

## 2025-03-09 PROCEDURE — 93970 EXTREMITY STUDY: CPT | Mod: 26

## 2025-03-09 PROCEDURE — 99232 SBSQ HOSP IP/OBS MODERATE 35: CPT

## 2025-03-09 PROCEDURE — 99222 1ST HOSP IP/OBS MODERATE 55: CPT

## 2025-03-09 NOTE — PROGRESS NOTE ADULT - PROBLEM SELECTOR PLAN 3
- likely 2/2 odynophagia  - can f/u rheum as outpt  - pt denies joint pains, skin rashes - likely 2/2 odynophagia  - pt denies joint pains, skin rashes  - rheum workup as per above

## 2025-03-09 NOTE — CONSULT NOTE ADULT - ATTENDING COMMENTS
History/PE as above.  Patient seen/examined.  As indicated, patient with systemic and constitutional symptoms in conjunction with dominant GI complaint of dysphagia and odynophagia.  Suspect systemic illness with GI manifestations as opposed to a primary GI pathologic process.  As indicated, very high CRP and strongly positive NORMA.  In addition, as noted, reported significant symptomatic improvement after 1 week administration of corticosteroids.  No improvement after several week treatment regimen of fluconazole for question of oral candidiasis.  Recommendations:  - Recommend rheumatology consult  - Plan for EGD +/- endoflip Monday  - Keep NPO MN on day procedure  - Send early am preop labs on day of procedure (cbc, bmp, coags, T&S)  - Warrants colonoscopy for MAURICIO, discussed w/ pt and will defer to outpatient setting due to odynophagia limiting ability to tolerate prep  - Soft, bite sized diet  - Send NORMA, ANCA, RF, TSH, C3, C4, anti-Jo1, anti-centromere, anti-Scl70, anti-SSA, anti-SSB  - F/u iron studies and ESR
as above      Dr. Tiera House  Rheumatology Attending  [facility]  Rheumatology at [place]     Contact:   call the office:: [phone] or reach [place] Microsoft Teams, weekdays before 5 pm   after 5 pm or on weekends, contact [phone]

## 2025-03-09 NOTE — PROGRESS NOTE ADULT - PROBLEM SELECTOR PLAN 2
- sent in by pmd for tachycardia of unclear etiol  - also has leukocytosis. last used steroids in Jan '25  - trend WBC  - monitor for fevers  - IVF  - replete K, mg    - last TTE in Dec '24 grossly normal  - will repeat TTE, repeat LE duplex given elevated ddimer  - observe off abx  - eventual ischemic eval - sent in by pmd for tachycardia of unclear etiol  - also has leukocytosis. last used steroids in Jan '25  - inflammatory markers elevated (ESR, CRP, ferritin)  - trend WBC  - monitor for fevers  - IVF  - replete K, mg    - last TTE in Dec '24 grossly normal  - will repeat TTE  - LE duplex without DVTs  - observe off abx  - eventual ischemic eval  - rheumatology consulted, workup ordered, appreciate recs

## 2025-03-09 NOTE — CHART NOTE - NSCHARTNOTEFT_GEN_A_CORE
Consulted to evaluate for systemic inflammatory process  Agree with Rheumatologic workup  Would also add on RNA polymerase III, smith + RNP ab (EBENEZER screen), CCP, CK    Rheumatology service to follow formally tomorrow    Xiang Pandey MD, PGY-4  Rheumatology Fellow  Reachable on TEAMS
Plan for EGD +/- tomorrow  - Keep patient NPO MN  - Send early am preop labs (cbc, bmp, coags, T&S)
64F estate planning , c hx HTN, MAURICIO (last IV iron 1 mo ago), elevated NORMA 1:1280 of unknown etiol, recent PNA (Jan '25) c/b subsequent odynophagia, sinus tachycardia, presents from PMD's office with odynophagia, cough, LAW, weight loss.    Pt with  2-3 months of odynophagia, pain at back of throat and upper chest, whenever she swallows solids/liquids, which has prevented her from eating or drinking much.   Reports 30 lb weight loss over the past 2 months, along with dehydration.   Last EGD/cscope was in 2011.   Seen by an ENT doctor who scoped the back of throat, and told her she has "infection" back there, and gave her 21 days of diflucan, which she completed, which did not resolve her pain, unclear if it improved her symptoms.  In Jan '25, pt had "PNA" for which she was given azithro, ?augmentin, 5 days of steroids. While she was taking these medications, pt had one episode of , for which she went to the hospital. Her HR did not go as high as that subsequently, but since then, she has had HR in the 90s-110s persistently, which is higher than her normal baseline of 60s. Other symptom is 1 month of cough that brings up white sputum, and LAW.  Pt reports having b/l LE duplex a few weeks ago. TTE Dec '24 showing EF 73% grossly normal heart function. barium esophagram on 3/5/25 @ [facility]. last stress test was in 2021.    Odynophagia  GI consulted  Plan for Endoscopy on 3/10      Followup Echo    Pt states had course of prednisone for which her symptoms improved  Rheum workup ordered, followup--> Consult Rheum inpt vs outpatient depending on GI workup.

## 2025-03-09 NOTE — PROGRESS NOTE ADULT - PROBLEM SELECTOR PLAN 1
- GI consult emailed for EGD  - will make npo for now - GI consult emailed for EGD, planned for 3/10  - Soft & bite sized diet for now

## 2025-03-09 NOTE — PROGRESS NOTE ADULT - SUBJECTIVE AND OBJECTIVE BOX
[facility] Attending Follow-up Note     Patient seen and examined at bedside.    Overnight Events:     events noted     REVIEW OF SYSTEMS:  Constitutional:     [x ] negative [ ] fevers [ ] chills [ ] weight loss [ ] weight gain  HEENT:                  [x ] negative [ ] dry eyes [ ] eye irritation [ ] postnasal drip [ ] nasal congestion  CV:                         [ x] negative  [ ] chest pain [ ] orthopnea [ ] palpitations [ ] murmur  Resp:                     [ x] negative [ ] cough [ ] shortness of breath [ ] dyspnea [ ] wheezing [ ] sputum [ ]hemoptysis  GI:                          [ x] negative [ ] nausea [ ] vomiting [ ] diarrhea [ ] constipation [ ] abd pain [ ] dysphagia   :                        [ x] negative [ ] dysuria [ ] nocturia [ ] hematuria [ ] increased urinary frequency  Musculoskeletal: [ x] negative [ ] back pain [ ] myalgias [ ] arthralgias [ ] fracture  Skin:                       [ x] negative [ ] rash [ ] itch  Neurological:        [x ] negative [ ] headache [ ] dizziness [ ] syncope [ ] weakness [ ] numbness  Psychiatric:           [ x] negative [ ] anxiety [ ] depression  Endocrine:            [ x] negative [ ] diabetes [ ] thyroid problem  Heme/Lymph:      [ x] negative [ ] anemia [ ] bleeding problem  Allergic/Immune: [ x] negative [ ] itchy eyes [ ] nasal discharge [ ] hives [ ] angioedema    [ x] All other systems negative  [ ] Unable to assess ROS due to    Current Meds:  acetaminophen     Tablet .. 650 milliGRAM(s) Oral every 6 hours PRN  influenza   Vaccine 0.5 milliLiter(s) IntraMuscular once  lactated ringers. 1000 milliLiter(s) IV Continuous <Continuous>      PAST MEDICAL & SURGICAL HISTORY:  HTN (hypertension)      GERD (gastroesophageal reflux disease)      S/P BRENT (total abdominal hysterectomy)      S/P breast biopsy  ?cyst removal          Vitals:  T(F): 97.4 (03-09), Max: 99.4 (03-09)  HR: 115 (03-09) (97 - 115)  BP: 124/74 (03-09) (117/71 - 126/72)  RR: 18 (03-09)  SpO2: 96% (03-09)  I&O's Summary    08 Mar 2025 06:01  -  09 Mar 2025 07:00  --------------------------------------------------------  IN: 870 mL / OUT: 0 mL / NET: 870 mL    09 Mar 2025 07:01  -  10 Mar 2025 00:47  --------------------------------------------------------  IN: 700 mL / OUT: 0 mL / NET: 700 mL        Physical Exam:  Appearance: No acute distress  HENT: No JVD   Cardiovascular: RRR, S1/S2, no murmurs  Respiratory: CTABL  Gastrointestinal: soft, NT ND, +BS  Musculoskeletal: No clubbing, no edema   Neurologic: Non-focal  Skin: No rashes, ecchymoses, or cyanosis                          7.9    10.29 )-----------( 308      ( 09 Mar 2025 06:41 )             24.5     03-09    136  |  100  |  6[L]  ----------------------------<  88  3.8   |  24  |  0.48[L]    Ca    8.5      09 Mar 2025 06:41  Phos  2.3     03-08  Mg     1.9     03-08    TPro  7.0  /  Alb  2.6[L]  /  TBili  0.2  /  DBili  x   /  AST  18  /  ALT  13  /  AlkPhos  56  03-08    PT/INR - ( 08 Mar 2025 05:46 )   PT: 13.9 sec;   INR: 1.21 ratio         PTT - ( 08 Mar 2025 05:47 )  PTT:28.0 sec              Cardiovascular Testings:

## 2025-03-09 NOTE — PROGRESS NOTE ADULT - SUBJECTIVE AND OBJECTIVE BOX
[facility] of [facility] Medicine  Vlad Marina MD  Available via MS Teams    SUBJECTIVE / OVERNIGHT EVENTS:    ADDITIONAL REVIEW OF SYSTEMS:    MEDICATIONS  (STANDING):  influenza   Vaccine 0.5 milliLiter(s) IntraMuscular once  lactated ringers. 1000 milliLiter(s) (200 mL/Hr) IV Continuous <Continuous>    MEDICATIONS  (PRN):  acetaminophen     Tablet .. 650 milliGRAM(s) Oral every 6 hours PRN Mild Pain (1 - 3)      I&O's Summary    08 Mar 2025 06:01  -  09 Mar 2025 07:00  --------------------------------------------------------  IN: 870 mL / OUT: 0 mL / NET: 870 mL        PHYSICAL EXAM:  Vital Signs Last 24 Hrs  T(C): 37.1 (09 Mar 2025 00:00), Max: 37.1 (09 Mar 2025 00:00)  T(F): 98.8 (09 Mar 2025 00:00), Max: 98.8 (09 Mar 2025 00:00)  HR: 110 (09 Mar 2025 04:00) (92 - 110)  BP: 117/71 (09 Mar 2025 04:00) (111/63 - 126/72)  BP(mean): --  RR: 18 (09 Mar 2025 04:00) (18 - 18)  SpO2: 99% (09 Mar 2025 04:00) (96% - 99%)    Parameters below as of 09 Mar 2025 04:00  Patient On (Oxygen Delivery Method): room air      GENERAL: Alert. Not confused. No acute distress. Not thin. Not cachectic. Not obese.  HEAD:  Atraumatic. Normocephalic.  EYES: EOMI. PERRLA. Normal conjunctiva/sclera.  ENT: Neck supple. No JVD. Moist oral mucosa. Not edentulous. No thrush.  LYMPH: Normal supraclavicular/cervical lymph nodes.   CARDIAC: +tachy, Not iraj. Regular rhythm. Not irregularly irregular. S1. S2. No murmur. No rub. No distant heart sounds.  LUNG/CHEST: CTAB. BS equal bilaterally. No wheezes. No rales. No rhonchi.  ABDOMEN: Soft. No tenderness. No distension. No fluid wave. Normal bowel sounds.  BACK: No midline/vertebral tenderness. No flank tenderness.  VASCULAR: +2 b/l radial or ulnar pulses. Palpable DP pulses.  EXTREMITIES:  No clubbing. No cyanosis. No edema. Moving all 4.  NEUROLOGY: A&Ox3. Non-focal exam. Cranial nerves intact. Normal speech. Sensation intact.  PSYCH: Normal behavior. Normal affect.  SKIN: No jaundice. No erythema. No rash/lesion.      LABS:                        7.9    10.29 )-----------( 308      ( 09 Mar 2025 06:41 )             24.5     03-09    136  |  100  |  6[L]  ----------------------------<  88  3.8   |  24  |  0.48[L]    Ca    8.5      09 Mar 2025 06:41  Phos  2.3     03-08  Mg     1.9     03-08    TPro  7.0  /  Alb  2.6[L]  /  TBili  0.2  /  DBili  x   /  AST  18  /  ALT  13  /  AlkPhos  56  03-08    PT/INR - ( 08 Mar 2025 05:46 )   PT: 13.9 sec;   INR: 1.21 ratio         PTT - ( 08 Mar 2025 05:47 )  PTT:28.0 sec      Urinalysis Basic - ( 09 Mar 2025 06:41 )    Color: x / Appearance: x / SG: x / pH: x  Gluc: 88 mg/dL / Ketone: x  / Bili: x / Urobili: x   Blood: x / Protein: x / Nitrite: x   Leuk Esterase: x / RBC: x / WBC x   Sq Epi: x / Non Sq Epi: x / Bacteria: x        Culture - Blood (collected 08 Mar 2025 07:34)  Source: Blood Blood-Venous  Preliminary Report (09 Mar 2025 09:01):    No growth at 24 hours    Culture - Blood (collected 08 Mar 2025 04:38)  Source: Blood Blood-Peripheral  Preliminary Report (09 Mar 2025 09:01):    No growth at 24 hours          RADIOLOGY & ADDITIONAL TESTS: Reviewed [facility] Division of Hospital Medicine  Vlad Marina MD  Available via MS Teams    SUBJECTIVE / OVERNIGHT EVENTS:  No acute events overnight. Complaining of some muscular pain in her L shoulder pain this AM.    MEDICATIONS  (STANDING):  influenza   Vaccine 0.5 milliLiter(s) IntraMuscular once  lactated ringers. 1000 milliLiter(s) (200 mL/Hr) IV Continuous <Continuous>    MEDICATIONS  (PRN):  acetaminophen     Tablet .. 650 milliGRAM(s) Oral every 6 hours PRN Mild Pain (1 - 3)      I&O's Summary    08 Mar 2025 06:01  -  09 Mar 2025 07:00  --------------------------------------------------------  IN: 870 mL / OUT: 0 mL / NET: 870 mL        PHYSICAL EXAM:  Vital Signs Last 24 Hrs  T(C): 37.1 (09 Mar 2025 00:00), Max: 37.1 (09 Mar 2025 00:00)  T(F): 98.8 (09 Mar 2025 00:00), Max: 98.8 (09 Mar 2025 00:00)  HR: 110 (09 Mar 2025 04:00) (92 - 110)  BP: 117/71 (09 Mar 2025 04:00) (111/63 - 126/72)  BP(mean): --  RR: 18 (09 Mar 2025 04:00) (18 - 18)  SpO2: 99% (09 Mar 2025 04:00) (96% - 99%)    Parameters below as of 09 Mar 2025 04:00  Patient On (Oxygen Delivery Method): room air      GENERAL: Alert. Not confused. No acute distress. Not thin. Not cachectic. Not obese.  HEAD:  Atraumatic. Normocephalic.  EYES: EOMI. PERRLA. Normal conjunctiva/sclera.  ENT: Neck supple. No JVD. Moist oral mucosa. Not edentulous. No thrush.  LYMPH: Normal supraclavicular/cervical lymph nodes.   CARDIAC: +tachy, Not iraj. Regular rhythm. Not irregularly irregular. S1. S2. No murmur. No rub. No distant heart sounds.  LUNG/CHEST: CTAB. BS equal bilaterally. No wheezes. No rales. No rhonchi.  ABDOMEN: Soft. No tenderness. No distension. No fluid wave. Normal bowel sounds.  BACK: No midline/vertebral tenderness. No flank tenderness.  VASCULAR: +2 b/l radial or ulnar pulses. Palpable DP pulses.  EXTREMITIES:  No clubbing. No cyanosis. No edema. Moving all 4.  NEUROLOGY: A&Ox3. Non-focal exam. Cranial nerves intact. Normal speech. Sensation intact.  PSYCH: Normal behavior. Normal affect.  SKIN: No jaundice. No erythema. No rash/lesion.      LABS:                        7.9    10.29 )-----------( 308      ( 09 Mar 2025 06:41 )             24.5     03-09    136  |  100  |  6[L]  ----------------------------<  88  3.8   |  24  |  0.48[L]    Ca    8.5      09 Mar 2025 06:41  Phos  2.3     03-08  Mg     1.9     03-08    TPro  7.0  /  Alb  2.6[L]  /  TBili  0.2  /  DBili  x   /  AST  18  /  ALT  13  /  AlkPhos  56  03-08    PT/INR - ( 08 Mar 2025 05:46 )   PT: 13.9 sec;   INR: 1.21 ratio         PTT - ( 08 Mar 2025 05:47 )  PTT:28.0 sec      Urinalysis Basic - ( 09 Mar 2025 06:41 )    Color: x / Appearance: x / SG: x / pH: x  Gluc: 88 mg/dL / Ketone: x  / Bili: x / Urobili: x   Blood: x / Protein: x / Nitrite: x   Leuk Esterase: x / RBC: x / WBC x   Sq Epi: x / Non Sq Epi: x / Bacteria: x        Culture - Blood (collected 08 Mar 2025 07:34)  Source: Blood Blood-Venous  Preliminary Report (09 Mar 2025 09:01):    No growth at 24 hours    Culture - Blood (collected 08 Mar 2025 04:38)  Source: Blood Blood-Peripheral  Preliminary Report (09 Mar 2025 09:01):    No growth at 24 hours          RADIOLOGY & ADDITIONAL TESTS: Reviewed

## 2025-03-09 NOTE — CONSULT NOTE ADULT - ASSESSMENT
#Positive NORMA  #Dyspnea  #Odynophagia  - Patient presenting with 2-3 months of odynophagia with dyspnea starting 1 month prior to arrival  - Trial of diflucan prescribed by ENT provided no relief for her pain; she did was prescribed prednisone however which did provide complete resolution of her symptoms  - Barium esophagram performed at OSH without acute abnormalities  - last EGD/colonoscopy was 2011 which showed gastritis and hemorrhoids but she has not had repeat evaluation  - Workup initially showed leukocytosis, microcytic anemia. CK is WNL  - Inflammatory markers: ,   - Serologies NORMA elevated; negative (-): ANCA, APLS serologies, dsDNA, CCP, RF,   - CT chest: Borderline heart size, Bibasilar atelectasis otherwise no lymphadenopathy. CT abdm limited due to motion but within those limitations, without acute pathologies    #Iron deficiency anemia  -     Recommendations:  - Obtain TTE  - Patient planned for EGD 3/10 - will follow results  - Will follow rest of obtained serologies (NORMA, scleroderma labs, EBENEZER screen)       #Positive NORMA  #Dyspnea  #Odynophagia  - Patient presenting with 2-3 months of odynophagia with dyspnea starting 1 month prior to arrival  - Trial of diflucan prescribed by ENT provided no relief for her pain; she did was prescribed prednisone however which did provide complete resolution of her symptoms  - Barium esophagram performed at OSH without acute abnormalities  - last EGD/colonoscopy was 2011 which showed gastritis and hemorrhoids but she has not had repeat evaluation  - Workup initially showed leukocytosis, microcytic anemia. CK is WNL  - Inflammatory markers: ,   - Serologies NORMA elevated; negative (-): ANCA, APLS serologies, dsDNA, CCP, RF,   - CT chest: Borderline heart size, Bibasilar atelectasis otherwise no lymphadenopathy. CT abdm limited due to motion but within those limitations, without acute pathologies    #Iron deficiency anemia  -     Recommendations:  Incomplete  - Obtain TTE  - Patient planned for EGD 3/10 - will follow results  - Please add on myoglobin and aldolase. Will follow rest of obtained serologies (NORMA, scleroderma labs, EBENEZER screen);   - Can consider MRI of c-spine given radiculopathy symptoms  - Rheumatology to follow closely    Xiang Pandey MD, PGY-4  Rheumatology Fellow  Reachable on TEAMS 64F PMH of HTN, MAURICIO (last IV iron 1 mo ago), elevated NORMA 1:1280 of unknown etiol, recent PNA (Jan '25) c/b subsequent odynophagia, sinus tachycardia, presents from PMD's office with odynophagia, cough, LAW, weight loss.    #Positive NORMA  #Dyspnea  #Odynophagia  - Patient presenting with 2-3 months of odynophagia with dyspnea starting 1 month prior to arrival  - Trial of diflucan prescribed by ENT provided no relief for her pain; she did was prescribed prednisone however which did provide complete resolution of her symptoms  - Barium esophagram performed at OSH without acute abnormalities  - last EGD/colonoscopy was 2011 which showed gastritis and hemorrhoids but she has not had repeat evaluation  - Workup initially showed leukocytosis, microcytic anemia. CK is WNL  - Inflammatory markers: ,   - Serologies NORMA elevated 1:1280 at OSH; otherwise negative (-): ANCA, APLS serologies, dsDNA, CCP, RF,   - CT chest: Borderline heart size, Bibasilar atelectasis otherwise no lymphadenopathy. CT abdm limited due to motion but within those limitations, without acute pathologies    #Iron deficiency anemia    Impression:  Patient presents with acute dysphagia/odynophagia with associated fatigue, dyspnea on exertion.   In setting of elevated NORMA seen on outside hospital, there is concern for an underlying systemic process such as systemic sclerosis, Sjogren's syndrome or SLE though she does not appear to have other clinical findings associated with these processes aside from GI manifestations.   Regarding her dyspnea, CT imaging performed which showed atelectasis but otherwise without parenchymal disease or lymphadenopathy  Thus far, serologies have been unrevealing although still pending systemic sclerosis ab and SLE serologies.   Strenght does appear to be grossly intact however, still with myalgias particularly of LUE. CK is WNL but will investigate further with myoglobin and aldolase. If concern for myositis remains elevated, may consider advanced imaging of LUE  Agree with obtaining comprehensive serologies. Will follow EGD.     Recommendations:  - Obtain Myoglobin and aldolase, will continue to follow rest of collected serologies  - Obtain TTE  - Patient planned for EGD 3/10 - will follow results  - Consider esophageal motility studies in setting of early satiety and sensation of stuck bolus  - Consider MRI c-spine in setting of neck pain with possible radicular symptoms to LUE  - Rheumatology service to follow closely    d/w Dr. Harsh Pandey MD, PGY-4  Rheumatology Fellow  Reachable on TEAMS 64F PMH of HTN, MAURICIO (last IV iron 1 mo ago), elevated NORMA 1:1280 of unknown etiology recent PNA (Jan '25) c/b subsequent odynophagia, sinus tachycardia, presents from PMD's office with odynophagia, cough, LAW, weight loss.    #Positive NORMA  #Dyspnea  #Odynophagia  - Patient presenting with 2-3 months of odynophagia with dyspnea starting 1 month prior to arrival  - Trial of diflucan prescribed by ENT provided no relief for her pain; she did was prescribed prednisone however which did provide complete resolution of her symptoms  - Barium esophagram performed at OSH without acute abnormalities  - last EGD/colonoscopy was 2011 which showed gastritis and hemorrhoids but she has not had repeat evaluation  - Workup initially showed leukocytosis, microcytic anemia. CK is WNL  - Inflammatory markers: ,   - Serologies NORMA elevated 1:1280 at OSH; otherwise negative (-): ANCA, APLS serologies, dsDNA, CCP, RF,   - CT chest: Borderline heart size, Bibasilar atelectasis otherwise no lymphadenopathy. CT abdomen limited due to motion but within those limitations, without acute pathologies    #Iron deficiency anemia    Impression:  Patient presents with acute dysphagia/odynophagia with associated fatigue, dyspnea on exertion.   In setting of elevated NORMA seen on outside hospital, there is concern for an underlying systemic process such as systemic sclerosis, Sjogren's syndrome or SLE though she does not appear to have other clinical findings associated with these processes aside from GI manifestations.   Regarding her dyspnea, CT imaging performed which showed atelectasis but otherwise without parenchymal disease or lymphadenopathy  Thus far, serologies have been unrevealing although still pending systemic sclerosis ab and SLE serologies.   Strength does appear to be grossly intact however, still with myalgias particularly of LUE. CK is WNL but will investigate further with myoglobin and aldolase. If concern for myositis remains elevated, may consider advanced imaging of LUE  Agree with obtaining comprehensive serologies. Will follow EGD.     Recommendations:  - Obtain Myoglobin and aldolase, will continue to follow rest of collected serologies  - Obtain TTE  - Patient planned for EGD 3/10 - will follow results  - Consider esophageal motility studies in setting of early satiety and sensation of stuck bolus  - Consider MRI c-spine in setting of neck pain with possible radicular symptoms to LUE  - Rheumatology service to follow closely    d/w Dr. Harsh Pandey MD, PGY-4  Rheumatology Fellow  Reachable on TEAMS

## 2025-03-09 NOTE — CONSULT NOTE ADULT - SUBJECTIVE AND OBJECTIVE BOX
STEFAN WASHINGTON  [id number]    HPI:  64F estate planning , radha hx HTN, MAURICIO (last IV iron 1 mo ago), elevated NORMA 1:1280 of unknown etiol, recent PNA (Jan '25) c/b subsequent odynophagia, sinus tachycardia, presents from PMD's office with odynophagia, cough, LAW, weight loss.    Pt reports 2-3 months of odynophagia, pain at back of throat and upper chest, whenever she swallows solids/liquids, which has prevented her from eating or drinking much. Reports 30 lb weight loss over the past 2 months, along with dehydration. Last EGD/cscope was in 2011. Pt went to see an ENT doctor who scoped the back of throat, and told her she has "infection" back there, and gave her 21 days of diflucan, which she completed, which did not resolve her pain, unclear if it improved her symptoms.  In Jan '25, pt had "PNA" for which she was given azithro, ?augmentin, 5 days of steroids. While she was taking these medications, pt had one episode of , for which she went to the hospital. Her HR did not go as high as that subsequently, but since then, she has had HR in the 90s-110s persistently, which is higher than her normal baseline of 60s. Other symptom is 1 month of cough that brings up white sputum, and LAW.  Denies chest pain, fevers, chills, diarrhea, urinary symptoms. Reports intermittent "abdominal cramping". Last reports she had IV iron infusion 1 month ago at outpt office. Denies night sweats.  Pt reports having b/l LE duplex a few weeks ago. TTE Dec '24 showing EF 73% grossly normal heart function. barium esophagram on 3/5/25 @ [facility]. last stress test was in 2021. (08 Mar 2025 03:10)    Rheumatology is consulted for further evaluation.         Rheum ROS    Denies fevers/chills/weight loss/night sweats/alopecia/sinus disease/asthma history/oral ulcers/dysphagia/vision changes/Raynauds/VTE/miscarraiges/sicca symptoms/urinary changes/edema/SOB/joint pain/swelling/jaw claudication/rash/photosensitivity/morning stiffness    Endorses fevers/chills/weight loss/night sweats/alopecia/sinus disease/asthma history/oral ulcers/dysphagia/vision changes/Raynauds/VTE/miscarraiges/sicca symptoms/urinary changes/edema/SOB/joint pain/swelling/jaw claudication/rash/photosensitivity/morning stiffness      MEDICATIONS  (STANDING):  influenza   Vaccine 0.5 milliLiter(s) IntraMuscular once  lactated ringers. 1000 milliLiter(s) (200 mL/Hr) IV Continuous <Continuous>    MEDICATIONS  (PRN):  acetaminophen     Tablet .. 650 milliGRAM(s) Oral every 6 hours PRN Mild Pain (1 - 3)      Allergies    NSAIDs (Hives)    Intolerances        PERTINENT MEDICATION HISTORY:    SOCIAL HISTORY:  OCCUPATION:  TRAVEL HISTORY:    FAMILY HISTORY:      Vital Signs Last 24 Hrs  T(C): 37.1 (09 Mar 2025 00:00), Max: 37.1 (09 Mar 2025 00:00)  T(F): 98.8 (09 Mar 2025 00:00), Max: 98.8 (09 Mar 2025 00:00)  HR: 110 (09 Mar 2025 04:00) (92 - 110)  BP: 117/71 (09 Mar 2025 04:00) (111/63 - 126/72)  BP(mean): --  RR: 18 (09 Mar 2025 04:00) (18 - 18)  SpO2: 99% (09 Mar 2025 04:00) (96% - 99%)    Parameters below as of 09 Mar 2025 04:00  Patient On (Oxygen Delivery Method): room air        Physical Exam:  General: No apparent distress  HEENT: EOMI, MMM  CVS: +S1/S2, RRR, no murmurs/rubs/gallops  Resp: CTA b/l. No crackles/wheezing  GI: Soft, NT/ND +BS  MSK: no swelling/warmth/erythema of the joints of the UE/LE  Neuro: AAOx3  Skin: no visible rashes    LABS:                        7.9    10.29 )-----------( 308      ( 09 Mar 2025 06:41 )             24.5     03-09    136  |  100  |  6[L]  ----------------------------<  88  3.8   |  24  |  0.48[L]    Ca    8.5      09 Mar 2025 06:41  Phos  2.3     03-08  Mg     1.9     03-08    TPro  7.0  /  Alb  2.6[L]  /  TBili  0.2  /  DBili  x   /  AST  18  /  ALT  13  /  AlkPhos  56  03-08    PT/INR - ( 08 Mar 2025 05:46 )   PT: 13.9 sec;   INR: 1.21 ratio         PTT - ( 08 Mar 2025 05:47 )  PTT:28.0 sec  Urinalysis Basic - ( 09 Mar 2025 06:41 )    Color: x / Appearance: x / SG: x / pH: x  Gluc: 88 mg/dL / Ketone: x  / Bili: x / Urobili: x   Blood: x / Protein: x / Nitrite: x   Leuk Esterase: x / RBC: x / WBC x   Sq Epi: x / Non Sq Epi: x / Bacteria: x            Rheumatology Work Up    ANCA Reflex MPO and PROT3: DONE (03-08-25 @ 05:47)  Double Stranded DNA Antibody: <1 IU/mL [Result Interpretation  <= 4 IU/mL:     Negative  5 - 9 IU/mL:     Indeterminate  >= 10 IU/mL:   Positive  Method: Multiplexed flow immunoassay] (03-08-25 @ 05:47)  Sedimentation Rate, Erythrocyte: 120 mm/hr *H* [0 - 20] (03-08-25 @ 05:46)  C-Reactive Protein: 118 mg/L *H* [0 - 4] (03-08-25 @ 05:44)            RADIOLOGY & ADDITIONAL STUDIES:     STEFAN WASHINGTON  [id number]    HPI:  64F estate planning , radha hx HTN, MAURICIO (last IV iron 1 mo ago), elevated NORMA 1:1280 of unknown etiol, recent PNA (Jan '25) c/b subsequent odynophagia, sinus tachycardia, presents from PMD's office with odynophagia, cough, LAW, weight loss.    Pt reports 2-3 months of odynophagia, pain at back of throat and upper chest, whenever she swallows solids/liquids, which has prevented her from eating or drinking much. Reports 30 lb weight loss over the past 2 months, along with dehydration. Last EGD/cscope was in 2011. Pt went to see an ENT doctor who scoped the back of throat, and told her she has "infection" back there, and gave her 21 days of diflucan, which she completed, which did not resolve her pain, unclear if it improved her symptoms.  In Jan '25, pt had "PNA" for which she was given azithro, ?augmentin, 5 days of steroids. While she was taking these medications, pt had one episode of , for which she went to the hospital. Her HR did not go as high as that subsequently, but since then, she has had HR in the 90s-110s persistently, which is higher than her normal baseline of 60s. Other symptom is 1 month of cough that brings up white sputum, and LAW.  Denies chest pain, fevers, chills, diarrhea, urinary symptoms. Reports intermittent "abdominal cramping". Last reports she had IV iron infusion 1 month ago at outpt office. Denies night sweats.  Pt reports having b/l LE duplex a few weeks ago. TTE Dec '24 showing EF 73% grossly normal heart function. barium esophagram on 3/5/25 @ [facility]. last stress test was in 2021. (08 Mar 2025 03:10)    Rheumatology is consulted for further evaluation. Patient reports prior to 2-3 months she was at baseline states of health. She then developed dysphagia and odynophagia to solids which would eventually progress to liquids as well. This was assocaited with fatigue, weight loss of 30 lbs since december, as dyspnea particularly with exertion. She also developed myalgias primarily to her proximal arms and legs. She was evaluated by ENT 1/2025 who trialed her on diflucan with no response. In 1/2025 again, she was given a course of prednisone and azithromycin and she noted during this time, her symptoms had improved. Due to continued symptoms, she would go to [facility] and there, she was noted to colt GREEN 1:1280; esophagram performed showed no abnormailities. Start few days prior to arrival, she also had onset of right eyelid dropping which has been improving. Patient reports having neck pain radiating to LUE, otherwise denies having any joint pain/stiffness, rashes, alopecia, sicca symptoms, oral ulcers, urinary changes. She denies hx of Raynaud's. Reports family history of RA. No recent travel or sick contacts      Rheum ROS  As noted above      MEDICATIONS  (STANDING):  influenza   Vaccine 0.5 milliLiter(s) IntraMuscular once  lactated ringers. 1000 milliLiter(s) (200 mL/Hr) IV Continuous <Continuous>    MEDICATIONS  (PRN):  acetaminophen     Tablet .. 650 milliGRAM(s) Oral every 6 hours PRN Mild Pain (1 - 3)      Allergies    NSAIDs (Hives)    Intolerances        PERTINENT MEDICATION HISTORY:    SOCIAL HISTORY:  OCCUPATION:  TRAVEL HISTORY:    FAMILY HISTORY:      Vital Signs Last 24 Hrs  T(C): 37.1 (09 Mar 2025 00:00), Max: 37.1 (09 Mar 2025 00:00)  T(F): 98.8 (09 Mar 2025 00:00), Max: 98.8 (09 Mar 2025 00:00)  HR: 110 (09 Mar 2025 04:00) (92 - 110)  BP: 117/71 (09 Mar 2025 04:00) (111/63 - 126/72)  BP(mean): --  RR: 18 (09 Mar 2025 04:00) (18 - 18)  SpO2: 99% (09 Mar 2025 04:00) (96% - 99%)    Parameters below as of 09 Mar 2025 04:00  Patient On (Oxygen Delivery Method): room air        Physical Exam:  General: No apparent distress  HEENT: EOMI, MMM  CVS: +S1/S2, RRR, no murmurs/rubs/gallops  Resp: bibasilar crackles  GI: Soft, NT/ND +BS  MSK: no swelling/warmth/erythema of the joints of the UE/LE  Neuro: AAOx3, 4/5 strenght to upper extremities b/l, 5/5 strength to lower extremies. ROM of LUE limited due to pain. TTP to b/l biceps region and right lateral knee. Fullness of b/l knees  Skin: no visible rashes    LABS:                        7.9    10.29 )-----------( 308      ( 09 Mar 2025 06:41 )             24.5     03-09    136  |  100  |  6[L]  ----------------------------<  88  3.8   |  24  |  0.48[L]    Ca    8.5      09 Mar 2025 06:41  Phos  2.3     03-08  Mg     1.9     03-08    TPro  7.0  /  Alb  2.6[L]  /  TBili  0.2  /  DBili  x   /  AST  18  /  ALT  13  /  AlkPhos  56  03-08    PT/INR - ( 08 Mar 2025 05:46 )   PT: 13.9 sec;   INR: 1.21 ratio         PTT - ( 08 Mar 2025 05:47 )  PTT:28.0 sec  Urinalysis Basic - ( 09 Mar 2025 06:41 )    Color: x / Appearance: x / SG: x / pH: x  Gluc: 88 mg/dL / Ketone: x  / Bili: x / Urobili: x   Blood: x / Protein: x / Nitrite: x   Leuk Esterase: x / RBC: x / WBC x   Sq Epi: x / Non Sq Epi: x / Bacteria: x            Rheumatology Work Up    ANCA Reflex MPO and PROT3: DONE (03-08-25 @ 05:47)  Double Stranded DNA Antibody: <1 IU/mL [Result Interpretation  <= 4 IU/mL:     Negative  5 - 9 IU/mL:     Indeterminate  >= 10 IU/mL:   Positive  Method: Multiplexed flow immunoassay] (03-08-25 @ 05:47)  Sedimentation Rate, Erythrocyte: 120 mm/hr *H* [0 - 20] (03-08-25 @ 05:46)  C-Reactive Protein: 118 mg/L *H* [0 - 4] (03-08-25 @ 05:44)            RADIOLOGY & ADDITIONAL STUDIES:

## 2025-03-10 ENCOUNTER — RESULT REVIEW (OUTPATIENT)
Age: 65
End: 2025-03-10

## 2025-03-10 LAB
ANION GAP SERPL CALC-SCNC: 13 MMOL/L — SIGNIFICANT CHANGE UP (ref 5–17)
APTT BLD: 27.2 SEC — SIGNIFICANT CHANGE UP (ref 24.5–35.6)
BLD GP AB SCN SERPL QL: NEGATIVE — SIGNIFICANT CHANGE UP
BUN SERPL-MCNC: 7 MG/DL — SIGNIFICANT CHANGE UP (ref 7–23)
C3 SERPL-MCNC: 116 MG/DL — SIGNIFICANT CHANGE UP (ref 81–157)
C4 SERPL-MCNC: 19 MG/DL — SIGNIFICANT CHANGE UP (ref 13–39)
CALCIUM SERPL-MCNC: 8.9 MG/DL — SIGNIFICANT CHANGE UP (ref 8.4–10.5)
CHLORIDE SERPL-SCNC: 97 MMOL/L — SIGNIFICANT CHANGE UP (ref 96–108)
CO2 SERPL-SCNC: 24 MMOL/L — SIGNIFICANT CHANGE UP (ref 22–31)
CREAT SERPL-MCNC: 0.54 MG/DL — SIGNIFICANT CHANGE UP (ref 0.5–1.3)
DRVVT RATIO: 0.96 RATIO — SIGNIFICANT CHANGE UP (ref 0–1.21)
DRVVT SCREEN TO CONFIRM RATIO: SIGNIFICANT CHANGE UP
EGFR: 103 ML/MIN/1.73M2 — SIGNIFICANT CHANGE UP
EGFR: 103 ML/MIN/1.73M2 — SIGNIFICANT CHANGE UP
GLUCOSE SERPL-MCNC: 92 MG/DL — SIGNIFICANT CHANGE UP (ref 70–99)
HCT VFR BLD CALC: 24.3 % — LOW (ref 34.5–45)
HGB BLD-MCNC: 7.7 G/DL — LOW (ref 11.5–15.5)
INR BLD: 1.25 RATIO — HIGH (ref 0.85–1.16)
MCHC RBC-ENTMCNC: 23.1 PG — LOW (ref 27–34)
MCHC RBC-ENTMCNC: 31.7 G/DL — LOW (ref 32–36)
MCV RBC AUTO: 72.8 FL — LOW (ref 80–100)
MYOGLOBIN SERPL-MCNC: 23 NG/ML — LOW (ref 25–58)
NORMALIZED SCT PPP-RTO: 0.76 RATIO — SIGNIFICANT CHANGE UP (ref 0–1.16)
NORMALIZED SCT PPP-RTO: SIGNIFICANT CHANGE UP
NRBC BLD AUTO-RTO: 0 /100 WBCS — SIGNIFICANT CHANGE UP (ref 0–0)
PLATELET # BLD AUTO: 341 K/UL — SIGNIFICANT CHANGE UP (ref 150–400)
POTASSIUM SERPL-MCNC: 3.5 MMOL/L — SIGNIFICANT CHANGE UP (ref 3.5–5.3)
POTASSIUM SERPL-SCNC: 3.5 MMOL/L — SIGNIFICANT CHANGE UP (ref 3.5–5.3)
PROTHROM AB SERPL-ACNC: 14.4 SEC — HIGH (ref 9.9–13.4)
RBC # BLD: 3.34 M/UL — LOW (ref 3.8–5.2)
RBC # FLD: 16.9 % — HIGH (ref 10.3–14.5)
RH IG SCN BLD-IMP: POSITIVE — SIGNIFICANT CHANGE UP
SODIUM SERPL-SCNC: 134 MMOL/L — LOW (ref 135–145)
WBC # BLD: 9.22 K/UL — SIGNIFICANT CHANGE UP (ref 3.8–10.5)
WBC # FLD AUTO: 9.22 K/UL — SIGNIFICANT CHANGE UP (ref 3.8–10.5)

## 2025-03-10 PROCEDURE — 43239 EGD BIOPSY SINGLE/MULTIPLE: CPT | Mod: GC

## 2025-03-10 PROCEDURE — 88305 TISSUE EXAM BY PATHOLOGIST: CPT | Mod: 26

## 2025-03-10 PROCEDURE — 99233 SBSQ HOSP IP/OBS HIGH 50: CPT

## 2025-03-10 RX ADMIN — Medication 650 MILLIGRAM(S): at 20:16

## 2025-03-10 RX ADMIN — Medication 650 MILLIGRAM(S): at 21:16

## 2025-03-10 NOTE — PROGRESS NOTE ADULT - SUBJECTIVE AND OBJECTIVE BOX
DATE OF SERVICE: 03-10-25 @ 17:20    Patient is a 64y old  Female who presents with a chief complaint of odynophagia, cough, LAW, weight loss (10 Mar 2025 12:06)      INTERVAL HISTORY: feels okay, s/p EGD earlier, family at bedside    REVIEW OF SYSTEMS:  CONSTITUTIONAL: No weakness  EYES/ENT: No visual changes;  No throat pain   NECK: No pain or stiffness  RESPIRATORY: No cough, wheezing; No shortness of breath  CARDIOVASCULAR: No chest pain or palpitations  GASTROINTESTINAL: No abdominal  pain. No nausea, vomiting, or hematemesis  GENITOURINARY: No dysuria, frequency or hematuria  NEUROLOGICAL: No stroke like symptoms  SKIN: No rashes    TELEMETRY Personally reviewed: ST 90s-120  	  MEDICATIONS:        PHYSICAL EXAM:  T(C): 36.7 (03-10-25 @ 12:31), Max: 37.4 (03-09-25 @ 20:22)  HR: 99 (03-10-25 @ 16:05) (98 - 115)  BP: 123/72 (03-10-25 @ 16:05) (104/51 - 130/77)  RR: 18 (03-10-25 @ 16:05) (18 - 23)  SpO2: 98% (03-10-25 @ 16:05) (95% - 98%)  Wt(kg): --  I&O's Summary    09 Mar 2025 07:01  -  10 Mar 2025 07:00  --------------------------------------------------------  IN: 700 mL / OUT: 0 mL / NET: 700 mL    10 Mar 2025 07:01  -  10 Mar 2025 17:20  --------------------------------------------------------  IN: 240 mL / OUT: 250 mL / NET: -10 mL      Height (cm): 157.5 (03-10 @ 10:57)  Weight (kg): 52.6 (03-10 @ 10:57)  BMI (kg/m2): 21.2 (03-10 @ 10:57)  BSA (m2): 1.52 (03-10 @ 10:57)    Appearance: In no distress	  HEENT:    PERRL, EOMI	  Cardiovascular:  S1 S2, No JVD  Respiratory: Lungs clear to auscultation	  Gastrointestinal:  Soft, Non-tender, + BS	  Vascularature:  No edema of LE  Psychiatric: Appropriate affect   Neuro: no acute focal deficits                               7.7    9.22  )-----------( 341      ( 10 Mar 2025 04:44 )             24.3     03-10    134[L]  |  97  |  7   ----------------------------<  92  3.5   |  24  |  0.54    Ca    8.9      10 Mar 2025 04:44          Labs personally reviewed      ASSESSMENT/PLAN: 	    64F with HTN, MAURICIO, elevated NORMA on OP lab, recent ER visits for PNA and dysphagia sxs. She was at Dr. Lyman office today noted to be hypoxic, tachycardiac, and reported + 40 lbs wt loss. She was sent in to the ER for concern of thromboembolic event.     -CTA neg for PE   -duplex neg for DVT   -CTAP neg for gross intra-abd pathologies   -Rheum eval for high inflammatory markers   -GI eval for dysphagia, pending EGD and colonoscopy   -TTE pending, echo in 2024 unremarkable   -remains tachycardiac, likely due to decreased po intake   -no cardiac sxs   - s/p EGD 3/10 with normal findings         Iolani Behrbom, AG-NP   Christopher Gaffney DO [facility]  Cardiovascular Medicine  [address] [place] [address], [address]  Available through call or text on [employer] TEAM[employer]  Office: [phone]   DATE OF SERVICE: 03-10-25 @ 17:20    Patient is a 64y old  Female who presents with a chief complaint of odynophagia, cough, LAW, weight loss (10 Mar 2025 12:06)      INTERVAL HISTORY: feels okay, s/p EGD earlier, family at bedside    REVIEW OF SYSTEMS:  CONSTITUTIONAL: No weakness  EYES/ENT: No visual changes;  No throat pain   NECK: No pain or stiffness  RESPIRATORY: No cough, wheezing; No shortness of breath  CARDIOVASCULAR: No chest pain or palpitations  GASTROINTESTINAL: No abdominal  pain. No nausea, vomiting, or hematemesis  GENITOURINARY: No dysuria, frequency or hematuria  NEUROLOGICAL: No stroke like symptoms  SKIN: No rashes    TELEMETRY Personally reviewed: ST 90s-120  	  MEDICATIONS:        PHYSICAL EXAM:  T(C): 36.7 (03-10-25 @ 12:31), Max: 37.4 (03-09-25 @ 20:22)  HR: 99 (03-10-25 @ 16:05) (98 - 115)  BP: 123/72 (03-10-25 @ 16:05) (104/51 - 130/77)  RR: 18 (03-10-25 @ 16:05) (18 - 23)  SpO2: 98% (03-10-25 @ 16:05) (95% - 98%)  Wt(kg): --  I&O's Summary    09 Mar 2025 07:01  -  10 Mar 2025 07:00  --------------------------------------------------------  IN: 700 mL / OUT: 0 mL / NET: 700 mL    10 Mar 2025 07:01  -  10 Mar 2025 17:20  --------------------------------------------------------  IN: 240 mL / OUT: 250 mL / NET: -10 mL      Height (cm): 157.5 (03-10 @ 10:57)  Weight (kg): 52.6 (03-10 @ 10:57)  BMI (kg/m2): 21.2 (03-10 @ 10:57)  BSA (m2): 1.52 (03-10 @ 10:57)    Appearance: In no distress	  HEENT:    PERRL, EOMI	  Cardiovascular:  S1 S2, No JVD  Respiratory: Lungs clear to auscultation	  Gastrointestinal:  Soft, Non-tender, + BS	  Vascularature:  No edema of LE  Psychiatric: Appropriate affect   Neuro: no acute focal deficits                               7.7    9.22  )-----------( 341      ( 10 Mar 2025 04:44 )             24.3     03-10    134[L]  |  97  |  7   ----------------------------<  92  3.5   |  24  |  0.54    Ca    8.9      10 Mar 2025 04:44          Labs personally reviewed      ASSESSMENT/PLAN: 	    64F with HTN, MAURICIO, elevated NORMA on OP lab, recent ER visits for PNA and dysphagia sxs. She was at Dr. Lyman office today noted to be hypoxic, tachycardiac, and reported + 40 lbs wt loss. She was sent in to the ER for concern of thromboembolic event.     -CTA neg for PE   -duplex neg for DVT   -CTAP neg for gross intra-abd pathologies   -Rheum eval for high inflammatory markers   -GI eval for dysphagia, s/p EGD and colonoscopy 3/10  -TTE pending, echo in 2024 unremarkable   - remains tachycardiac, likely due to decreased po intake, will monitor  - no cardiac sxs   - s/p EGD 3/10 with normal findings         Iolani Behrbom, AG-BRANDI Gaffney DO [facility]  Cardiovascular Medicine  [address] [place] [address], [address]  Available through call or text on [employer] TEAM[employer]  Office: [phone]

## 2025-03-10 NOTE — PRE PROCEDURE NOTE - PRE PROCEDURE EVALUATION
Attending Physician: Dr Freitas                          Procedure: EGD     Indication for Procedure: dysphagia   ________________________________________________________  PAST MEDICAL & SURGICAL HISTORY:  HTN (hypertension)      GERD (gastroesophageal reflux disease)      S/P BRENT (total abdominal hysterectomy)      S/P breast biopsy  ?cyst removal        ALLERGIES:  NSAIDs (Hives)    HOME MEDICATIONS:  Norvasc 5 mg oral tablet: 1 tab(s) orally once a day    AICD/PPM: [ ] yes   [x ] no    PERTINENT LAB DATA:                        7.7    9.22  )-----------( 341      ( 10 Mar 2025 04:44 )             24.3     03-10    134[L]  |  97  |  7   ----------------------------<  92  3.5   |  24  |  0.54    Ca    8.9      10 Mar 2025 04:44      PT/INR - ( 10 Mar 2025 04:45 )   PT: 14.4 sec;   INR: 1.25 ratio         PTT - ( 10 Mar 2025 04:45 )  PTT:27.2 sec            PHYSICAL EXAMINATION:    T(C): 37  HR: 98  BP: 128/71  RR: 18  SpO2: 96%    Constitutional: NAD    Neck:  No JVD  Respiratory: normal effort   Cardiovascular: RRR  Extremities: No peripheral edema  Neurological: A/O x 4, no focal deficits        COMMENTS:    The patient is a suitable candidate for the planned procedure unless box checked [ ]  No, explain:

## 2025-03-10 NOTE — PRE-ANESTHESIA EVALUATION ADULT - RESPIRATORY RATE (BREATHS/MIN)
Patient was calling in asking for a refill on his Clonazapam that was prescribed by Dr. Felipe Medina who was is pain management doctor in [facility], this physician has since retired.    Patient states he is having a hard time managing his anxiety due to his wound on his abdomen, he is positive for MRSA in his wound and was prescribed Bactroban per wound care and general surgery, patient states the wound is itching and it gives him anxiety. Patient would like a refill on his clonazepam 1 mg by mouth 2 times a day PRN.    LOV: 10/25/22 for post op  Last medicare wellnesss/follow up was 6/27/22  NOV: 4/26/22    Please advise.    Medication(s) Requested: Klonopin   Last office visit: 10/25/22  Last refill:  6/14/21 #30 with 5 refills  Is the patient due for refill of this medication(s): Yes  PDMP review: Criteria met. Forwarded to Physician/LINSEY for signature.      21

## 2025-03-10 NOTE — PROGRESS NOTE ADULT - PROBLEM SELECTOR PLAN 2
- sent in by pmd for tachycardia of unclear etiol  - also has leukocytosis. last used steroids in Jan '25  - inflammatory markers elevated (ESR, CRP, ferritin)  - trend WBC  - monitor for fevers  - IVF  - replete K, mg    - last TTE in Dec '24 grossly normal  - will repeat TTE  - LE duplex without DVTs  - observe off abx  - eventual ischemic eval  - rheumatology consulted, workup ordered, appreciate recs

## 2025-03-10 NOTE — ASU PREOP CHECKLIST - NSBLOODTRANS_GEN_A_CORE_SIUH
"Anesthesia Transfer of Care Note    Patient: Sofie Muniz    Procedure(s) Performed: Procedure(s) (LRB):  EXPLORATION-WOUND (N/A)    Patient location: PACU    Anesthesia Type: MAC    Transport from OR: Transported from OR on room air with adequate spontaneous ventilation    Post pain: pain needs to be addressed (pt received Dilaudid post procedure. Taken to PACU. Report given . Dr. Green also aware. )    Post assessment: no apparent anesthetic complications    Post vital signs: stable    Level of consciousness: awake, alert and oriented    Nausea/Vomiting: no nausea/vomiting    Complications: none    Transfer of care protocol was followed      Last vitals:   Visit Vitals  BP (!) 140/80 (BP Location: Right arm, Patient Position: Lying)   Pulse 69   Temp 36.6 °C (97.9 °F) (Oral)   Resp 18   Ht 5' 4" (1.626 m)   Wt 90.3 kg (199 lb)   SpO2 98%   Breastfeeding? No   BMI 34.16 kg/m²     "
no...

## 2025-03-10 NOTE — PROGRESS NOTE ADULT - PROBLEM SELECTOR PLAN 3
Patient:   AYAKA ALCANTAR            MRN: [id number]            FIN: [id number]               Age:   44 years     Sex:  Female     :  1977   Associated Diagnoses:   Diabetes mellitus, type 2; Obese   Author:   Daniela Adhikari      Visit Information   Visit type:  Diabetes Self Management Education .    Referral source:  Inocente Gutierrez MD.       Chief Complaint   DM Type II, Obesity       History of Present Illness   2021  Pt with hx of GDM requiring insulin therapy/ pump.  Pt was not tested for DM yearly.    New dx of DM Type II with a1c   Group Detail Date Value w/Units Flags      General Chemistry Hgb A1c 2021  7.3  HI      General Chemistry Hgb A1c 2021  7.4  HI      General Chemistry Hgb A1c 2021  7.7  HI        2022 Since last visit pt has significantly been changing her intake.  Pt did get  on New Years and recently back from [facility].      Discussed nutrition, diabetes, and lifestyle management with pt  Nutrition:    am protein shake (herbalife), noon and evening meals protein, vegetables, some fruit daily   Fluids: water, milk   Physical Activity:  ~3x/ week works out with her sister who is a  cardio and weights   Stress:   mod  Sleep: good, no longer waking up to urinate   Support: family, friends   BG Testing: not often #3 readings 109mg/dL 30 day average  BG in office 141mg/dL fasting (has not been taking metformin   DM related medication: metformin ER 500mg 4 tabs - has not been taking does not like the large size of pills and also c/o gas, reflux  Ozempic 1mg weekly - taking as directed, initially some nausea but resolved   Routine diabetes care:    Dilated Retinal Eye Exam:  due - will have report sent to clinic    Skin: intact   Dental: UTD, 2x/ year good oral cares  Feet: no concerns  Immunizations: influenza vaccine due   Hgb A1c: 7.3% = 163 mg/dL estimated average glucose      Health Status   Allergies:    Allergic Reactions  - likely 2/2 odynophagia  - pt denies joint pains, skin rashes  - rheum workup as per above (Selected)  No Known Medication Allergies   Medications:  (Selected)   Prescriptions  Prescribed  Advair Diskus 250 mcg-50 mcg inhalation powder: 1 puff(s), Inhale, bid, # 60 EA, 1 Refill(s), Type: Maintenance, Pharmacy: [facility], 1 puff(s) Inhale bid, 60.75, in, 11/11/21 11:18:00 CST, Height Measured, 222.4, lb, 11/04/21 14:08:00 CD...  Aspir 81 oral delayed release tablet: = 1 tab(s) ( 81 mg ), Oral, daily, # 90 EA, 0 Refill(s), Type: Maintenance, Pharmacy: [facility], 1 tab(s) Oral daily, 60.75, in, 08/30/21 14:20:00 CDT, Height Measured, 226, lb, 08/13/21 9:5...  MetFORMIN (Eqv-Glucophage XR) 500 mg oral tablet, extended release: = 4 tab(s) ( 2,000 mg ), Oral, daily, # 360 tab(s), 1 Refill(s), Type: Maintenance, Pharmacy: [facility], 4 tab(s) Oral daily, 60.75, in, 08/30/21 14:20:00 CDT, Height Measured, 226, lb, 08/1...  Nexplanon 68 mg subcutaneous implant: 1 EA ( 68 mg ), subcutaneous, once, # 1 EA, 0 Refill(s), Type: Soft Stop, other reason (Rx)  Ozempic (1 mg dose) 4 mg/3 mL subcutaneous solution: ( 1 mg ), Subcutaneous, qweek, Instructions: rotate injection sites, # 3 mL, 6 Refill(s), Type: Maintenance, Pharmacy: [facility], 1 mg Subcutaneous qweek,Instr:rotate injection sites, 60.75,...  Ventolin HFA 90 mcg/inh inhalation aerosol: 2 puff(s), INH, QID, PRN: for wheezing, # 17 g, 1 Refill(s), Type: Maintenance, [facility], mask and aerochamber needed, 2 puff(s) Inhale qid,PRN:for wheezing, 60.75, in, 08/13/21 9...  accu chek guide test strips: accu chek guide test strips, See Instructions, Instructions: testing 2x/ day, Supply, # 200 EA, 3 Refill(s), Type: Maintenance, Pharmacy: [facility], testing 2x/ day, 60.75, in, 10/21/21 16:2...  desogestrel-ethinyl estradiol 0.15 mg-0.03 mg oral tablet: 1 tab(s), Oral, daily, # 84 tab(s), 0 Refill(s), Type: Maintenance, Pharmacy: [facility], 1 tab(s) Oral daily, 60.75, in, 10/21/21 16:29:00 CDT, Height Measured, 222.6, lb, 10/21/21 16:29:00...  rosuvastatin 20 mg oral tablet: = 1 tab(s) ( 20 mg ), po, hs, # 90 EA, 1 Refill(s), Type: Maintenance, Pharmacy: [facility], 1 tab(s) Oral hs, 60.75, in, 08/30/21 14:20:00 CDT, Height Measured, 226, lb, 08/13/21 9:51:00 CDT...  valved holding chamber spacer: valved holding chamber spacer, See Instructions, Instructions: use with albuterol, Supply, # 1 EA, 0 Refill(s), Type: Maintenance, Pharmacy: [facility], use with albuterol, 60.75, in, 08/13/2...,    Medications          *denotes recorded medication          valved holding chamber spacer: See Instructions, use with albuterol, 1 EA, 0 Refill(s).          accu chek guide test strips: See Instructions, testing 2x/ day, 200 EA, 3 Refill(s).          Ventolin HFA 90 mcg/inh inhalation aerosol: 2 puff(s), INH, QID, PRN: for wheezing, 17 g, 1 Refill(s).          Aspir 81 oral delayed release tablet: 81 mg, 1 tab(s), Oral, daily, 90 EA, 0 Refill(s).          desogestrel-ethinyl estradiol 0.15 mg-0.03 mg oral tablet: 1 tab(s), Oral, daily, 84 tab(s), 0 Refill(s).          Nexplanon 68 mg subcutaneous implant: 68 mg, 1 EA, subcutaneous, once, 1 EA, 0 Refill(s).          Advair Diskus 250 mcg-50 mcg inhalation powder: 1 puff(s), Inhale, bid, 60 EA, 1 Refill(s).          MetFORMIN (Eqv-Glucophage XR) 500 mg oral tablet, extended release: 2,000 mg, 4 tab(s), Oral, daily, 360 tab(s), 1 Refill(s).          rosuvastatin 20 mg oral tablet: 20 mg, 1  tab(s), po, hs, 90 EA, 1 Refill(s).          Ozempic (1 mg dose) 4 mg/3 mL subcutaneous solution: 1 mg, Subcutaneous, qweek, rotate injection sites, 3 mL, 6 Refill(s).       Problem list:    All Problems  Diabetes mellitus, type 2 / SNOMED CT 918714905 / Confirmed  Obese / ICD-9-.00 / Probable  Mild major depression / SNOMED CT 771496314 / Confirmed  History of chicken pox / SNOMED CT 446040519 / Confirmed  Abnormal Pap Smear / ICD-9-.9 / Confirmed  Resolved: Pregnancy / SNOMED CT 255896562  Resolved: Pregnancy / SNOMED CT 163609658  Resolved: Pregnancy / SNOMED CT 610971665  Resolved: Pregnancy / SNOMED CT 643972402  Resolved: Pregnancy / SNOMED CT 396015117  Canceled: Diabetes Mellitus, Gestational / ICD-9-.80      Histories   Past Medical History:    Active  Abnormal Pap Smear (796.9)  History of chicken pox (338578235)  Resolved  Pregnancy (826256454):  Resolved on 8/18/1997 at 20 years.  Pregnancy (114366534):  Resolved on 8/12/2003 at 26 years.  Pregnancy (896082778):  Resolved on 1/8/2005 at 27 years.  Pregnancy (165680862):  Resolved on 7/15/2008 at 31 years.  Pregnancy (022273955):  Resolved on 10/6/2010 at 33 years.   Family History:    Diabetes mellitus  Father (Jose)  Asthma  Daughter (Bibi)     Procedure history:    Encounter for initial prescription of implantable subdermal contraceptive (ICD-10-CM Z30.017) performed by Jackie Alvarez on 8/30/2021 at 44 Years.  Comments:  8/30/2021 5:31 PM Dorinda Goldsmith LPN  Nexplanon placed in left arm    Lot# [id number]  Exp. 09/29/2023    Due for removal in 3 years - on or before 08/30/2024  Encounter for surveillance of implantable subdermal contraceptive (ICD-10-CM Z30.46) performed by Jackie Alvarez on 8/30/2021 at 44 Years.  Comments:  8/30/2021 6:26 PM Dorinda Goldsmith LPN  Nexplanon removed from left arm and replaced    Originally placed 11/14/2017  GERSON (SNOMED CT 12007332) on 2/7/2000 at 22  Years.  Colposcopy.  Excision of birthmark on face.   Social History:        Electronic Cigarette/Vaping Assessment            Electronic Cigarette Use: Never.      Alcohol Assessment: Current            1 x per month, 3 drinks/episode average.      Tobacco Assessment            Never (less than 100 in lifetime)      Substance Abuse Assessment: Denies Substance Abuse            Never      Employment and Education Assessment            Human Resources      Home and Environment Assessment            Marital status: .  Spouse/Partner name: Clovis.      Nutrition and Health Assessment            Type of diet: Regular.      Exercise and Physical Activity Assessment: Regular exercise            Exercise frequency: 3-4 times/week.  Exercise type: Running, Weight lifting.      Sexual Assessment            Sexually active: Yes.  Sexual orientation: Heterosexual.      Other Assessment            Regular menses.  Menstrual duration 6 days.  Cycle interval 30 days.  History of abnormal pap smear.        Physical Examination   Vital Signs   1/19/2022 9:11 AM CST Systolic Blood Pressure 107 mmHg    Diastolic Blood Pressure 73 mmHg    Mean Arterial Pressure 84 mmHg      Measurements from flowsheet : Measurements   1/19/2022 9:11 AM CST Height Measured - Standard 60.75 in    Weight Measured - Standard 206.4 lb    BSA 2 m2    Body Mass Index 39.32 kg/m2  HI         Review / Management   Results review      Impression and Plan   Diagnosis     Diabetes mellitus, type 2 (MVL33-ON E11.9).     Obese (NOA33-GQ E66.9).       Counseled: Patient, ANIKET Self Care Behaviors   Today the patient was provided with a review and further instruction on the progression of diabetes mellitus, prevention of heart disease, prevention of complications, and lifestyle guidelines.  Healthy Eating - review of carbohydrate counting, reading food labels, appropriate portion sizes, well balanced meals, diabetic plate method as a general rule.  Patient is  provided with additional ideas to incorporate dietary recommendations, increase meal planning and preparation.  Mindful eating, finding other coping mechanisms besides food  Instructed on Therapeutic Lifestyle Changes (TLC) nutrition plan for heart healthy eating.     Low cholesterol (<200mg), low fat, low saturated fat, zero trans fat   Low sodium (2000mg)   High fiber diet (20 - 30gm); Soluble fiber 10+ gm/ day    Eat more omega 3 fats  Vegetarian at least 1 day per week  Being Active - Education on how exercise helps with :    - lowering BG by increasing the muscles ability to take up and use glucose   - weight loss   - healthier heart (improve lipid profile)   - improve sleep, mood, energy   - decrease stress  Monitoring -  Reviewed recommended testing schedule and blood glucose target levels.    Taking Medication - Education on the 8 core defects of type II diabetes and how the different classes of medications have different primary physiological actions.  Discussed decreasing metformin to ii tabs in evening and monitor tolerance and glucose, continue with Ozempic 1mg weekly   Problem Solving - medical support team assistance, resources provided (written and apps, internet); good control of stress  Healthy Coping - support of friends, family, and medical support team   Reducing Risks - Detailed education on potential complications associated with uncontrolled diabetes and prevention recommendations.  Recommendations include: annual eye exam, good oral cares with brushing BID and flossing daily, routine dental appointments, good foot care tips and shoe wear - discussed monofilament test yearly.  Importance of adequate sleep and good control of BG to prevent developing complications related to uncontrolled DM including nephropathy, neuropathy, retinopathy, and cardiovascular disease.  Weight loss goal of 7 - 10% of current body weight pounds as a reasonable goal to help increase insulin sensitivity          Goals:  1.  A1c <6.5%  2.  BG testing 2x/ day on 5-7 days/ week before eating 80 - 130 target; two hours after eating 80 - 150mg/dL  3.  Physical active 150 min/ week   4.  Carb controlled heart healthy meal plan <130gm CHO/ day; <200mg Cholesterol/ day   5.  Take medications as directed will try metformin ER 500mg 2 tabs monitor tolerance, Ozempic 1.0 mg weekly injection     Follow up in 4 months       Professional Services   Time spent with pt 30 min   cc Dr. Gutierrez

## 2025-03-10 NOTE — ASU PREOP CHECKLIST - IDENTIFICATION BAND VERIFIED
[facility] Hematology/Oncology  PROGRESS NOTE -   Follow-up Visit      Subjective:       Patient ID:   NAME: Valery Huggins : 1941     78 y.o. female    Referring Doc: David Mendieta MD  Other Physicians: Armando Nath; Stanislav Epstein; Azar Donnelly; Mo    Chief Complaint:  Left RCC and rectal CA f/u    History of Present Illness:     Patient returns today for a  regularly scheduled follow-up visit.  The patient is here today to go over the results of the recently ordered labs, tests and studies. She had recently completed combination XRt and chemotherapy. No CP, SOB, HA's or N/V. She is here with a family friend.      She saw Dr Hassan with rad/onc on 2019.    She previously saw Dr Mendieta on 12/10/2019  and had MRI on 2020.  They did scope on 2020 and I spoke to dr mendieta by phone last week. The scope looked good and both the patient and Dr Mendieta does not want to proceed in direction of operation especially given her age and co-morbidities.    We previously discussed adjuvant chemotherapy with FOLOFOX x 10-12 cycles especially since she is not having surgery and she was agreeable.    Will previously provided literature on the regimen and set up chemotherapy    She started chemotherapy last week and seems to have done well so far. She had some mild nausea which was controlled with antiemetics.               ROS:   GEN: normal without any fever, night sweats or weight loss  HEENT: normal with no HA's, sore throat, stiff neck, changes in vision  CV: normal with no CP, SOB, PND, RAYA or orthopnea  PULM: normal with no SOB, cough, hemoptysis, sputum or pleuritic pain  GI: normal with no abdominal pain, nausea, vomiting, constipation, diarrhea, melanotic stools, BRBPR, or hematemesis  : normal with no hematuria, dysuria  BREAST: normal with no mass, discharge, pain  SKIN: normal with no rash, erythema, bruising, or swelling    Allergies:  Review of patient's allergies indicates:  No Known  "Allergies    Medications:    Current Outpatient Medications:     amLODIPine (NORVASC) 10 MG tablet, Take 1 tablet (10 mg total) by mouth once daily., Disp: 90 tablet, Rfl: 0    ascorbic acid (VITAMIN C) 100 MG tablet, Take 100 mg by mouth once daily., Disp: , Rfl:     aspirin (ECOTRIN) 81 MG EC tablet, Take 81 mg by mouth once daily., Disp: , Rfl:     atorvastatin (LIPITOR) 10 MG tablet, Take 10 mg by mouth every evening. , Disp: , Rfl: 2    b complex vitamins capsule, Take 1 capsule by mouth once daily., Disp: , Rfl:     BD ULTRA-FINE CONNIE PEN NEEDLE 32 gauge x 5/32" Ndle, INJECT TWICE DAILY AS DIRECTED, Disp: 200 each, Rfl: 0    calcitRIOL (ROCALTROL) 0.5 MCG Cap, TAKE 1 CAPSULE (0.5 MCG TOTAL) BY MOUTH ONCE DAILY., Disp: 90 capsule, Rfl: 0    cholecalciferol, vitamin D3, (VITAMIN D3) 4,000 unit Cap, Take 1 capsule by mouth once daily., Disp: , Rfl:     docusate sodium (COLACE) 100 MG capsule, Take 100 mg by mouth 2 (two) times daily., Disp: , Rfl:     escitalopram oxalate (LEXAPRO) 20 MG tablet, TAKE ONE TABLET BY MOUTH DAILY, Disp: 90 tablet, Rfl: 0    FREESTYLE LITE METER kit, , Disp: , Rfl: 0    FREESTYLE LITE STRIPS Strp, , Disp: , Rfl: 3    levothyroxine (SYNTHROID) 88 MCG tablet, Take 1 tablet (88 mcg total) by mouth before breakfast., Disp: 30 tablet, Rfl: 11    magnesium oxide (MAG-OX) 400 mg tablet, TAKE ONE TABLET BY MOUTH TWICE DAILY, Disp: 180 tablet, Rfl: 0    mirabegron (MYRBETRIQ) 50 mg Tb24, Take 1 tablet (50 mg total) by mouth once daily., Disp: 30 tablet, Rfl: 11    ondansetron (ZOFRAN) 8 MG tablet, Take 1 tablet (8 mg total) by mouth every 8 (eight) hours as needed., Disp: 30 tablet, Rfl: 5    pantoprazole (PROTONIX) 40 MG tablet, TAKE ONE TABLET BY MOUTH DAILY, Disp: 90 tablet, Rfl: 0    promethazine (PHENERGAN) 25 MG tablet, Take 1 tablet (25 mg total) by mouth every 4 to 6 hours as needed., Disp: 30 tablet, Rfl: 5    TOUJEO SOLOSTAR U-300 INSULIN 300 unit/mL (1.5 mL) InPn " pen, INJECT 30 UNITS INTO THE SKIN ONCE DAILY., Disp: 4.5 mL, Rfl: 3    valsartan (DIOVAN) 40 MG tablet, Take 40 mg by mouth once daily. , Disp: , Rfl: 3    VICTOZA 3-XAVIER 0.6 mg/0.1 mL (18 mg/3 mL) PnIj, INJECT 1.8MG SUBCUTANEAOUSLY DAILY, Disp: 9 mL, Rfl: 0    solifenacin (VESICARE) 5 MG tablet, Take 1 tablet (5 mg total) by mouth once daily. (Patient not taking: Reported on 3/10/2020), Disp: 30 tablet, Rfl: 11    PMHx/PSHx Updates:  See patient's last visit with me on 2/13/2020  See H&P on 10/8/2019        Pathology:  Cancer Staging  No matching staging information was found for the patient.          Objective:     Vitals:  Blood pressure 129/80, pulse 83, temperature 98.9 °F (37.2 °C), temperature source Oral, resp. rate 12, weight 67.3 kg (148 lb 4.8 oz), last menstrual period 06/01/2012.    Physical Examination:   GEN: no apparent distress, comfortable; AAOx3  HEAD: atraumatic and normocephalic  EYES: no pallor, no icterus, PERRLA  ENT: OMM, no pharyngeal erythema, external ears WNL; no nasal discharge; no thrush  NECK: no masses, thyroid normal, trachea midline, no LAD/LN's, supple  CV: RRR with no murmur; normal pulse; normal S1 and S2; no pedal edema  CHEST: Normal respiratory effort; CTAB; normal breath sounds; no wheeze or crackles; portactah on right CW  ABDOM: nontender and nondistended; soft; normal bowel sounds; no rebound/guarding  MUSC/Skeletal: ROM normal; no crepitus; joints normal; no deformities or arthropathy  EXTREM: no clubbing, cyanosis, inflammation or swelling  SKIN: no rashes, lesions, ulcers, petechiae or subcutaneous nodules  : no hendrickson  NEURO: grossly intact; motor/sensory WNL; AAOx3; no tremors  PSYCH: normal mood, affect and behavior  LYMPH: normal cervical, supraclavicular, axillary and groin LN's            Labs:     3/6/2020  Lab Results   Component Value Date    WBC 4.5 03/06/2020    HGB 11.2 (L) 03/06/2020    HCT 33.6 (L) 03/06/2020    MCV 87.5 03/06/2020      03/06/2020     CMP  Sodium   Date Value Ref Range Status   03/06/2020 137 135 - 146 mmol/L Final     Potassium   Date Value Ref Range Status   03/06/2020 4.2 3.5 - 5.3 mmol/L Final     Chloride   Date Value Ref Range Status   03/06/2020 97 (L) 98 - 110 mmol/L Final     CO2   Date Value Ref Range Status   03/06/2020 31 20 - 32 mmol/L Final     Glucose   Date Value Ref Range Status   03/06/2020 134 65 - 139 mg/dL Final     Comment:               Non-fasting reference interval          BUN, Bld   Date Value Ref Range Status   03/06/2020 36 (H) 7 - 25 mg/dL Final     Creatinine   Date Value Ref Range Status   03/06/2020 1.83 (H) 0.60 - 0.93 mg/dL Final     Comment:     For patients >49 years of age, the reference limit  for Creatinine is approximately 13% higher for people  identified as -American.          Calcium   Date Value Ref Range Status   03/06/2020 7.9 (L) 8.6 - 10.4 mg/dL Final     Total Protein   Date Value Ref Range Status   03/06/2020 6.1 6.1 - 8.1 g/dL Final     Albumin   Date Value Ref Range Status   03/06/2020 3.7 3.6 - 5.1 g/dL Final     Total Bilirubin   Date Value Ref Range Status   03/06/2020 0.9 0.2 - 1.2 mg/dL Final     Alkaline Phosphatase   Date Value Ref Range Status   03/06/2020 56 37 - 153 U/L Final     AST   Date Value Ref Range Status   03/06/2020 16 10 - 35 U/L Final     ALT   Date Value Ref Range Status   03/06/2020 22 6 - 29 U/L Final     Anion Gap   Date Value Ref Range Status   01/14/2020 9 8 - 16 mmol/L Final     eGFR if    Date Value Ref Range Status   03/06/2020 30 (L) > OR = 60 mL/min/1.73m2 Final     eGFR if non    Date Value Ref Range Status   03/06/2020 26 (L) > OR = 60 mL/min/1.73m2 Final           Radiology/Diagnostic Studies:    CT  3/5/2020    Impression       Status post left nephrectomy without evidence of recurrent renal malignancy.    No significant change since prior study.           MRI  1/8/2020:  Impression       Significant  reduction in size of the patient's known mid rectal mass, which is no longer distinctly visible.  Reduction in size of 3 mesorectal lymph nodes as above.               PET  10/17/2019    Impression       1. Left paratracheal small mass extending into superior mediastinum with associated moderate FDG activity is unchanged in size and appearance since 02/16/2018 CT.  This is unlikely to represent metastatic disease or malignancy, given stability, and may represent residual thyroid parenchyma but is otherwise nonspecific.  2. Unchanged 6 mm right upper lobe nodule since 02/16/2018.  Benign etiology is likely the continued CT thorax without IV contrast follow-up is recommended in 12 months to document greater than 2 years of stability.  3. No current convincing evidence of metastatic disease.  4. Previous rectal mass is no longer evident on this exam.               X-ray Chest Ap Portable    Result Date: 10/15/2019  EXAMINATION: XR CHEST AP PORTABLE CLINICAL HISTORY: s/p port; Encounter for adjustment and management of vascular access device TECHNIQUE: Single frontal view of the chest was performed. COMPARISON: 6/18/2019 FINDINGS: The cardiomediastinal silhouette is stable.  Lungs are clear of infiltrate.  No pleural effusions.  Laya catheter has been inserted from the region of the right subclavian vein with the tip at the level the proximal SVC.     Laya catheter inserted with the tip at the level the proximal SVC.  Lungs are expanded and clear.  No pneumothorax. Electronically signed by: Nubia Venegas MD Date:    10/15/2019 Time:    14:05    Surg Fl Surgery Fluoro Usage    Result Date: 10/15/2019  See OP Notes for results. IMPRESSION: See OP Notes for results. This procedure was auto-finalized by: [facility] Radi[facility]     Mri Rectal Cancer Without Contrast    Result Date: 9/18/2019  EXAMINATION: MRI RECTAL CANCER WITHOUT CONTRAST CLINICAL HISTORY: Rectal cancer, staging, locoregional;rectal anal mass;  Malignant  neoplasm of rectum TECHNIQUE: Multiplanar multisequence MR imaging of the pelvis without contrast. COMPARISON: 07/12/2019 FINDINGS: Approximately 4 cm from the anal verge there is a peripherally located lobular mass along the dorsal rectal wall.  This measures 4 cm in length and 3.3 cm in diameter.  There is heterogeneous signal intensity.  There is restricted diffusion in the lesion and no discrete extra colonic extension.  Several lymph nodes are noted in the mesorectal fat including two which measure 3 mm near the inferior sacrum, series 8, image 16, and a 5 mm lymph node at the S2 level, series 8, image 8.  Prominent but nonenlarged bilateral obturator chain lymph nodes also noted, on the right at 4 mm and left at 6 mm.  There is urinary bladder wall thickening which is diffuse.  Moderate degree of stool in the colon.  No dilated bowel loops.  Hysterectomy.     4 cm mid rectal lesion in keeping with known malignancy.  No discrete extension into the mesorectal fascia although there are several perirectal lymph nodes which raise the possibility for locoregional lymph node involvement. Electronically signed by: Bebo Kapoor Date:    09/18/2019 Time:    16:45      I have reviewed all available lab results and radiology reports.    Assessment/Plan:   (1) 78 y.o. female with diagnosis of prior left RCC who has been referred by David Mendieta MD for evaluation by medical hematology/oncology. She has been followed by Dr Stanislav Epstein with [facility] at the [facility] in [place]. She last saw Dr Epstein in July 2019. She was recently diagnosed with rectal mass by Dr Armando Duff which was found on colonoscopy on 8/26/2019. She had presented with lower Gi bleeding at that time. The pathology from those biopsies showed no evidence of malignancy at that time. She was subsequently seen by Dr David Mendieta and underwent trans-anal surgical biopsy on 9/23/2019. The pathology from the surgical biopsy showed rectal  carcinoma.         She has been seen by Dr Fareed Hassan with Rad/onc for radiation therapy evaluation.     PET was done on 10/17/2019     We previously discussed giving her combined chemotherapy with the XRt to try to reduce her risk of recurrence. She was agreeable to this plan.    She had portacath placed with Dr Mendieta. She saw Dr Hassan last week with rad/onc. She has since completed weekly XRT and chemotherapy    - She previously saw Dr Mendieta on 12/10/2019  and had MRI on 1/8/2020.  They did scope on 1/28th 2020 and I spoke to dr mendieta by phone last week. The scope looked good and both the patient and Dr Mendieta does not want to proceed in direction of operation especially given her age and co-morbidities.    - We previously discussed adjuvant chemotherapy with FOLOFOX x 10-12 cycles especially since she is not having surgery and she is agreeable.    - Will previously  provide literature on the regimen and set up chemotherapy    - she started chemotherapy last week with cycle #1        (2) Left renal cell carcinoma s/p left nephrectomy with Dr Azar Donnelly - diagnosed about 2 years ago     (3) Ureterolithiasis     (4) CRI - stage III - followed by Dr Antonio; she saw him recently and per patient's report, her kidney function is stable per Dr Antonio  - she is seeing Dr Antonio this coming Thursday     (5) HTN and hypercholesterolemia     (6) DM - followed by Dr Brower with endocrinology     (7) Hx/of gall stones     (8) Chronic left shoulder issues     (9) MVP     (10) Thyroid disease with prior hx/of thyroid cancer s/p thyroidectomy      (11) Chronic anemia - most likley multifactorial due to iron deficiency, GIB and anemia of chronic renal disease  - hgb at 11.2 currently       VISIT DIAGNOSES:      Malignant neoplasm of rectum    Normocytic anemia    Rectal cancer    Renal cell carcinoma of left kidney          PLAN:  1.  Encouraged compliance with labs  2. Continue current regimen of FOLFOX   3.  F/u with  PCP, GI, rad/onc , etc  4.  F/u nephrology - she is seeing Dr gusman this Thursday  5. Refill antiemetics     6. RTC in 4 weeks with me and in 1-2 weeks with Antoinette    Fax note to Pham Winston Parks; Tete Duff Tandron; Iraida Brower    Discussion:       I spent over 25 mins of time with the patient. Reviewed results of the recently ordered labs, tests and studies; made directives with regards to the results. Over half of this time was spent couseling and coordinating care.    I have explained all of the above in detail and the patient understands all of the current recommendation(s). I have answered all of their questions to the best of my ability and to their complete satisfaction.   The patient is to continue with the current management plan.            Electronically signed by Carrillo Goode MD         done

## 2025-03-10 NOTE — PROGRESS NOTE ADULT - SUBJECTIVE AND OBJECTIVE BOX
[facility] Division of Hospital Medicine  Amanda Martin MD  MS Teams PREFERRED        SUBJECTIVE / OVERNIGHT EVENTS: NAD    MEDICATIONS  (STANDING):  influenza   Vaccine 0.5 milliLiter(s) IntraMuscular once  lactated ringers. 1000 milliLiter(s) (200 mL/Hr) IV Continuous <Continuous>  sodium chloride 0.9%. 500 milliLiter(s) (30 mL/Hr) IV Continuous <Continuous>    MEDICATIONS  (PRN):  acetaminophen     Tablet .. 650 milliGRAM(s) Oral every 6 hours PRN Mild Pain (1 - 3)      I&O's Summary    09 Mar 2025 07:01  -  10 Mar 2025 07:00  --------------------------------------------------------  IN: 700 mL / OUT: 0 mL / NET: 700 mL    10 Mar 2025 07:01  -  10 Mar 2025 12:06  --------------------------------------------------------  IN: 0 mL / OUT: 250 mL / NET: -250 mL        PHYSICAL EXAM:  Vital Signs Last 24 Hrs  T(C): 36.8 (10 Mar 2025 10:57), Max: 37.4 (09 Mar 2025 20:22)  T(F): 98.2 (10 Mar 2025 10:47), Max: 99.4 (09 Mar 2025 20:22)  HR: 102 (10 Mar 2025 12:05) (98 - 115)  BP: 116/60 (10 Mar 2025 12:05) (104/51 - 128/71)  BP(mean): 80 (10 Mar 2025 10:57) (80 - 80)  RR: 20 (10 Mar 2025 12:05) (18 - 23)  SpO2: 98% (10 Mar 2025 12:05) (95% - 99%)    Parameters below as of 10 Mar 2025 12:05  Patient On (Oxygen Delivery Method): room air      GENERAL: Alert. Not confused. No acute distress. Not thin. Not cachectic. Not obese.  HEAD:  Atraumatic. Normocephalic.  EYES: EOMI. PERRLA. Normal conjunctiva/sclera.  ENT: Neck supple. No JVD. Moist oral mucosa. Not edentulous. No thrush.  LYMPH: Normal supraclavicular/cervical lymph nodes.   CARDIAC: +tachy, Not iraj. Regular rhythm. Not irregularly irregular. S1. S2. No murmur. No rub. No distant heart sounds.  LUNG/CHEST: CTAB. BS equal bilaterally. No wheezes. No rales. No rhonchi.  ABDOMEN: Soft. No tenderness. No distension. No fluid wave. Normal bowel sounds.  BACK: No midline/vertebral tenderness. No flank tenderness.  VASCULAR: +2 b/l radial or ulnar pulses. Palpable DP pulses.  EXTREMITIES:  No clubbing. No cyanosis. No edema. Moving all 4.  NEUROLOGY: A&Ox3. Non-focal exam. Cranial nerves intact. Normal speech. Sensation intact.  PSYCH: Normal behavior. Normal affect.  SKIN: No jaundice. No erythema. No rash/lesion.    LABS:                        7.7    9.22  )-----------( 341      ( 10 Mar 2025 04:44 )             24.3     03-10    134[L]  |  97  |  7   ----------------------------<  92  3.5   |  24  |  0.54    Ca    8.9      10 Mar 2025 04:44      PT/INR - ( 10 Mar 2025 04:45 )   PT: 14.4 sec;   INR: 1.25 ratio         PTT - ( 10 Mar 2025 04:45 )  PTT:27.2 sec      Urinalysis Basic - ( 10 Mar 2025 04:44 )    Color: x / Appearance: x / SG: x / pH: x  Gluc: 92 mg/dL / Ketone: x  / Bili: x / Urobili: x   Blood: x / Protein: x / Nitrite: x   Leuk Esterase: x / RBC: x / WBC x   Sq Epi: x / Non Sq Epi: x / Bacteria: x        Culture - Blood (collected 08 Mar 2025 07:34)  Source: Blood Blood-Venous  Preliminary Report (10 Mar 2025 09:01):    No growth at 48 Hours    Culture - Blood (collected 08 Mar 2025 04:38)  Source: Blood Blood-Peripheral  Preliminary Report (10 Mar 2025 09:01):    No growth at 48 Hours

## 2025-03-10 NOTE — ASU PREOP CHECKLIST - NSBLOODTRANSFCONSENT_GEN_A_CORE
Dear Dr Qureshi ,    Can the CKD be further specified?            ( x  )  Chronic Kidney Disease Stage 3 (moderate)  (GFR 30-59)                _x___   Stage 3a  (45-59)               ____    Stage 3b (30-44)         (   )  Other: Please Specify: _____________________________         __________________________________________________________________________________    Clinical Indicators:    11/11 H&P : CKD  - cre 1.65  - has 1.3-1.6  - likely due to uncontrolled HTN       eGFR:     11/11-- 47  11/12---52  11/13---54  11/14---44    Risk Factors: Hypertension    Treatment:   -- Amlodipine 10mg  changed to 5mg daily   -- Clonidine 0.1mg 3x a day - changed to 0.2mg 2x a day  --Lisinopril  20 mg  increased to 40mg nightly   --Metoprolol 100 mg 2x a day  --Aldactone 25mg daily           If you have any questions ,please call Clinical : Sarah Seay/LANE-BSN  at cell # [phone]     THIS FORM IS A PERMANENT PART OF THE MEDICAL RECORD    
yes

## 2025-03-11 ENCOUNTER — RESULT REVIEW (OUTPATIENT)
Age: 65
End: 2025-03-11

## 2025-03-11 ENCOUNTER — TRANSCRIPTION ENCOUNTER (OUTPATIENT)
Age: 65
End: 2025-03-11

## 2025-03-11 ENCOUNTER — APPOINTMENT (OUTPATIENT)
Dept: PULMONOLOGY | Facility: CLINIC | Age: 65
End: 2025-03-11

## 2025-03-11 VITALS
OXYGEN SATURATION: 98 % | RESPIRATION RATE: 18 BRPM | DIASTOLIC BLOOD PRESSURE: 77 MMHG | TEMPERATURE: 98 F | HEART RATE: 86 BPM | SYSTOLIC BLOOD PRESSURE: 118 MMHG

## 2025-03-11 LAB
ALDOLASE SERPL-CCNC: 4.8 U/L — SIGNIFICANT CHANGE UP (ref 3.3–10.3)
ANA TITR SER: NEGATIVE — SIGNIFICANT CHANGE UP
ANTI-RIBONUCLEAR PROTEIN: <0.2 AI — SIGNIFICANT CHANGE UP
CENTROMERE AB SER-ACNC: <0.2 AI — SIGNIFICANT CHANGE UP
ENA JO1 AB SER-ACNC: <0.2 AI — SIGNIFICANT CHANGE UP
ENA SCL70 AB SER-ACNC: <0.2 AI — SIGNIFICANT CHANGE UP
ENA SM AB FLD QL: <0.2 AI — SIGNIFICANT CHANGE UP
HCT VFR BLD CALC: 25.1 % — LOW (ref 34.5–45)
HGB BLD-MCNC: 7.7 G/DL — LOW (ref 11.5–15.5)
MCHC RBC-ENTMCNC: 22.5 PG — LOW (ref 27–34)
MCHC RBC-ENTMCNC: 30.7 G/DL — LOW (ref 32–36)
MCV RBC AUTO: 73.4 FL — LOW (ref 80–100)
NRBC BLD AUTO-RTO: 0 /100 WBCS — SIGNIFICANT CHANGE UP (ref 0–0)
PLATELET # BLD AUTO: 323 K/UL — SIGNIFICANT CHANGE UP (ref 150–400)
RBC # BLD: 3.42 M/UL — LOW (ref 3.8–5.2)
RBC # FLD: 17.2 % — HIGH (ref 10.3–14.5)
RNAP III AB SER-ACNC: 6 UNITS — SIGNIFICANT CHANGE UP (ref 0–19)
SSA-52 (RO52) (ENA) ANTIBODY, IGG: 156 AU/ML — HIGH (ref 0–40)
SSA-60 (RO60) (ENA) ANTIBODY, IGG: 0 AU/ML — SIGNIFICANT CHANGE UP (ref 0–40)
SURGICAL PATHOLOGY STUDY: SIGNIFICANT CHANGE UP
WBC # BLD: 7.39 K/UL — SIGNIFICANT CHANGE UP (ref 3.8–10.5)
WBC # FLD AUTO: 7.39 K/UL — SIGNIFICANT CHANGE UP (ref 3.8–10.5)

## 2025-03-11 PROCEDURE — 86147 CARDIOLIPIN ANTIBODY EA IG: CPT

## 2025-03-11 PROCEDURE — 83874 ASSAY OF MYOGLOBIN: CPT

## 2025-03-11 PROCEDURE — 85025 COMPLETE CBC W/AUTO DIFF WBC: CPT

## 2025-03-11 PROCEDURE — 85610 PROTHROMBIN TIME: CPT

## 2025-03-11 PROCEDURE — 82330 ASSAY OF CALCIUM: CPT

## 2025-03-11 PROCEDURE — 85018 HEMOGLOBIN: CPT

## 2025-03-11 PROCEDURE — 80053 COMPREHEN METABOLIC PANEL: CPT

## 2025-03-11 PROCEDURE — 86038 ANTINUCLEAR ANTIBODIES: CPT

## 2025-03-11 PROCEDURE — 83735 ASSAY OF MAGNESIUM: CPT

## 2025-03-11 PROCEDURE — 86850 RBC ANTIBODY SCREEN: CPT

## 2025-03-11 PROCEDURE — 87040 BLOOD CULTURE FOR BACTERIA: CPT

## 2025-03-11 PROCEDURE — 82728 ASSAY OF FERRITIN: CPT

## 2025-03-11 PROCEDURE — 93306 TTE W/DOPPLER COMPLETE: CPT | Mod: 26

## 2025-03-11 PROCEDURE — 84443 ASSAY THYROID STIM HORMONE: CPT

## 2025-03-11 PROCEDURE — 86036 ANCA SCREEN EACH ANTIBODY: CPT

## 2025-03-11 PROCEDURE — 96374 THER/PROPH/DIAG INJ IV PUSH: CPT

## 2025-03-11 PROCEDURE — 82803 BLOOD GASES ANY COMBINATION: CPT

## 2025-03-11 PROCEDURE — 83516 IMMUNOASSAY NONANTIBODY: CPT

## 2025-03-11 PROCEDURE — 83550 IRON BINDING TEST: CPT

## 2025-03-11 PROCEDURE — 86200 CCP ANTIBODY: CPT

## 2025-03-11 PROCEDURE — 83615 LACTATE (LD) (LDH) ENZYME: CPT

## 2025-03-11 PROCEDURE — 86225 DNA ANTIBODY NATIVE: CPT

## 2025-03-11 PROCEDURE — 82607 VITAMIN B-12: CPT

## 2025-03-11 PROCEDURE — 82550 ASSAY OF CK (CPK): CPT

## 2025-03-11 PROCEDURE — 85652 RBC SED RATE AUTOMATED: CPT

## 2025-03-11 PROCEDURE — 86146 BETA-2 GLYCOPROTEIN ANTIBODY: CPT

## 2025-03-11 PROCEDURE — 86160 COMPLEMENT ANTIGEN: CPT

## 2025-03-11 PROCEDURE — 99239 HOSP IP/OBS DSCHRG MGMT >30: CPT

## 2025-03-11 PROCEDURE — 93306 TTE W/DOPPLER COMPLETE: CPT

## 2025-03-11 PROCEDURE — 83690 ASSAY OF LIPASE: CPT

## 2025-03-11 PROCEDURE — 82435 ASSAY OF BLOOD CHLORIDE: CPT

## 2025-03-11 PROCEDURE — 71275 CT ANGIOGRAPHY CHEST: CPT | Mod: MC

## 2025-03-11 PROCEDURE — 86255 FLUORESCENT ANTIBODY SCREEN: CPT

## 2025-03-11 PROCEDURE — 86235 NUCLEAR ANTIGEN ANTIBODY: CPT

## 2025-03-11 PROCEDURE — 99231 SBSQ HOSP IP/OBS SF/LOW 25: CPT | Mod: GC

## 2025-03-11 PROCEDURE — 86431 RHEUMATOID FACTOR QUANT: CPT

## 2025-03-11 PROCEDURE — 82085 ASSAY OF ALDOLASE: CPT

## 2025-03-11 PROCEDURE — 85045 AUTOMATED RETICULOCYTE COUNT: CPT

## 2025-03-11 PROCEDURE — 84436 ASSAY OF TOTAL THYROXINE: CPT

## 2025-03-11 PROCEDURE — 76376 3D RENDER W/INTRP POSTPROCES: CPT

## 2025-03-11 PROCEDURE — 86900 BLOOD TYPING SEROLOGIC ABO: CPT

## 2025-03-11 PROCEDURE — 82947 ASSAY GLUCOSE BLOOD QUANT: CPT

## 2025-03-11 PROCEDURE — 84480 ASSAY TRIIODOTHYRONINE (T3): CPT

## 2025-03-11 PROCEDURE — 85613 RUSSELL VIPER VENOM DILUTED: CPT

## 2025-03-11 PROCEDURE — 36415 COLL VENOUS BLD VENIPUNCTURE: CPT

## 2025-03-11 PROCEDURE — 88305 TISSUE EXAM BY PATHOLOGIST: CPT

## 2025-03-11 PROCEDURE — 83540 ASSAY OF IRON: CPT

## 2025-03-11 PROCEDURE — 99285 EMERGENCY DEPT VISIT HI MDM: CPT | Mod: 25

## 2025-03-11 PROCEDURE — 93970 EXTREMITY STUDY: CPT

## 2025-03-11 PROCEDURE — 84484 ASSAY OF TROPONIN QUANT: CPT

## 2025-03-11 PROCEDURE — 85027 COMPLETE CBC AUTOMATED: CPT

## 2025-03-11 PROCEDURE — 87637 SARSCOV2&INF A&B&RSV AMP PRB: CPT

## 2025-03-11 PROCEDURE — 85014 HEMATOCRIT: CPT

## 2025-03-11 PROCEDURE — 84100 ASSAY OF PHOSPHORUS: CPT

## 2025-03-11 PROCEDURE — 85379 FIBRIN DEGRADATION QUANT: CPT

## 2025-03-11 PROCEDURE — 85730 THROMBOPLASTIN TIME PARTIAL: CPT

## 2025-03-11 PROCEDURE — 81001 URINALYSIS AUTO W/SCOPE: CPT

## 2025-03-11 PROCEDURE — 83880 ASSAY OF NATRIURETIC PEPTIDE: CPT

## 2025-03-11 PROCEDURE — 97161 PT EVAL LOW COMPLEX 20 MIN: CPT

## 2025-03-11 PROCEDURE — 74177 CT ABD & PELVIS W/CONTRAST: CPT | Mod: MC

## 2025-03-11 PROCEDURE — 84439 ASSAY OF FREE THYROXINE: CPT

## 2025-03-11 PROCEDURE — 80048 BASIC METABOLIC PNL TOTAL CA: CPT

## 2025-03-11 PROCEDURE — 83605 ASSAY OF LACTIC ACID: CPT

## 2025-03-11 PROCEDURE — 84295 ASSAY OF SERUM SODIUM: CPT

## 2025-03-11 PROCEDURE — 86140 C-REACTIVE PROTEIN: CPT

## 2025-03-11 PROCEDURE — 84132 ASSAY OF SERUM POTASSIUM: CPT

## 2025-03-11 PROCEDURE — 86901 BLOOD TYPING SEROLOGIC RH(D): CPT

## 2025-03-11 RX ORDER — AMLODIPINE BESYLATE 10 MG/1
1 TABLET ORAL
Refills: 0 | DISCHARGE

## 2025-03-11 RX ADMIN — Medication 650 MILLIGRAM(S): at 07:55

## 2025-03-11 RX ADMIN — Medication 650 MILLIGRAM(S): at 08:55

## 2025-03-11 NOTE — PROGRESS NOTE ADULT - SUBJECTIVE AND OBJECTIVE BOX
Chief Complaint:  Patient is a 64y old  Female who presents with a chief complaint of odynophagia, cough, LAW, weight loss (10 Mar 2025 17:19)      Interval Events:   -s/p egd yesterday with normal appearing esophagus, gastritis.     Allergies:  NSAIDs (Hives)      Hospital Medications:  acetaminophen     Tablet .. 650 milliGRAM(s) Oral every 6 hours PRN  influenza   Vaccine 0.5 milliLiter(s) IntraMuscular once  lactated ringers. 1000 milliLiter(s) IV Continuous <Continuous>  sodium chloride 0.9%. 500 milliLiter(s) IV Continuous <Continuous>        PHYSICAL EXAM:   Vital Signs:  Vital Signs Last 24 Hrs  T(C): 36.8 (11 Mar 2025 04:27), Max: 37 (10 Mar 2025 10:00)  T(F): 98.2 (11 Mar 2025 04:27), Max: 98.6 (10 Mar 2025 10:00)  HR: 83 (11 Mar 2025 04:27) (83 - 114)  BP: 115/69 (11 Mar 2025 04:27) (104/51 - 130/77)  BP(mean): 80 (10 Mar 2025 10:57) (80 - 80)  RR: 18 (11 Mar 2025 04:27) (18 - 23)  SpO2: 98% (11 Mar 2025 04:27) (95% - 98%)    Parameters below as of 11 Mar 2025 04:27  Patient On (Oxygen Delivery Method): room air      Daily Height in cm: 157.48 (10 Mar 2025 10:57)    Daily     GENERAL:  No acute distress  HEENT:  NCAT, no scleral icterus  CHEST: no resp distress  HEART:  RRR  ABDOMEN:  Soft, non-tender, non-distended, normoactive bowel sounds, no masses, no hepato-splenomegaly, no signs of chronic liver disease  EXTREMITIES:  No cyanosis, clubbing, or edema  SKIN:  No rash/erythema/ecchymoses/petechiae/wounds/abscess/warm/dry  NEURO:  Alert and oriented x 3, no asterixis, no tremor    LABS:                        7.7    7.39  )-----------( 323      ( 11 Mar 2025 06:47 )             25.1     Mean Cell Volume: 73.4 fl (03-11-25 @ 06:47)    03-10    134[L]  |  97  |  7   ----------------------------<  92  3.5   |  24  |  0.54    Ca    8.9      10 Mar 2025 04:44        PT/INR - ( 10 Mar 2025 04:45 )   PT: 14.4 sec;   INR: 1.25 ratio         PTT - ( 10 Mar 2025 04:45 )  PTT:27.2 sec  Urinalysis Basic - ( 10 Mar 2025 04:44 )    Color: x / Appearance: x / SG: x / pH: x  Gluc: 92 mg/dL / Ketone: x  / Bili: x / Urobili: x   Blood: x / Protein: x / Nitrite: x   Leuk Esterase: x / RBC: x / WBC x   Sq Epi: x / Non Sq Epi: x / Bacteria: x            Imaging:    < from: Upper Endoscopy (03.10.25 @ 10:42) >       The duodenal bulb and second portion of the duodenum were normal.                                                                              Impression:          - Normal esophagus. Biopsied.                       - Gastritis.                       - Normal duodenal bulb and second portion of the duodenum.                - Biopsies were taken with a cold forceps for histology in the gastric antrum.  Recommendation:      - [facility] to [facility] for [facility].                       - Perform ambulatory esophageal manometry.     - Await pathology results.    < end of copied text >

## 2025-03-11 NOTE — DISCHARGE NOTE NURSING/CASE MANAGEMENT/SOCIAL WORK - FINANCIAL ASSISTANCE
[facility] provides services at a reduced cost to those who are determined to be eligible through [facility]’s financial assistance program. Information regarding [facility]’s financial assistance program can be found by going to [email or web address] or by calling [phone].

## 2025-03-11 NOTE — PHYSICAL THERAPY INITIAL EVALUATION ADULT - PERTINENT HX OF CURRENT PROBLEM, REHAB EVAL
63 yo F with PMH of HTN, MAURICIO, and recent pneumonia presenting for odynophagia, dysphagia, dyspnea, and weakness for 2 months, found to have elevated CRP, microcytic anemia, and positive NORMA c/f systemic process. Hosp course: CTA chest (3/7) No pulmonary embolism. CT abdomen/pelvis (3/7) no acute pathology in the abdomen or pelvis is identified. VA duplex BLE (3/9) No evidence of deep venous thrombosis in either lower extremity.

## 2025-03-11 NOTE — DISCHARGE NOTE PROVIDER - NSDCFUADDAPPT_GEN_ALL_CORE_FT
APPTS ARE READY TO BE MADE: [X] YES    Best Family or Patient Contact (if needed):    Additional Information about above appointments (if needed):    1: Dr. Ramirez  2: Dr. House  3: Dr. Freitas    Other comments or requests:    APPTS ARE READY TO BE MADE: [X] YES    Best Family or Patient Contact (if needed):    Additional Information about above appointments (if needed):    1: Dr. Ramirez  2: Dr. House  3: Dr. Freitas    Other comments or requests:   Prior to outreaching the patient, it was visible that the patient has secured a follow up appointment which was not scheduled by our team. Patient is scheduled 3/13 at 11:20AM at [address] [facility] with Dr. Nieves Lyman     Prior to outreaching the patient, it was visible that the patient has secured a follow up appointment which was not scheduled by our team. Patient is scheduled 4/3 at 9AM at [address] [facility] with Dr. Tiera House     Prior to outreaching the patient, it was visible that the patient has secured a follow up appointment which was not scheduled by our team. Patient is scheduled 4/2 at 1PM at [address] [facility] with Dr. Mary Freitas    APPTS ARE READY TO BE MADE: [X] YES    Best Family or Patient Contact (if needed):    Additional Information about above appointments (if needed):    1: Dr. Ramirez  2: Dr. House  3: Dr. Freitas    Other comments or requests:   Prior to outreaching the patient, it was visible that the patient has secured a follow up appointment which was not scheduled by our team. Patient is scheduled 3/13 at 11:20AM at [address] [facility] with Dr. Nieves Lyman     Prior to outreaching the patient, it was visible that the patient has secured a follow up appointment which was not scheduled by our team. Patient is scheduled 4/3 at 9AM at [address] [facility] with Dr. Tiera House     A [facility] providers office was contacted to secure an appointment, however the office will follow up with the patient/caregiver directly. Task sent to Dr. Ramirez's office for an appt.     Prior to outreaching the patient, it was visible that the patient has secured a follow up appointment which was not scheduled by our team. Patient is scheduled 4/2 at 1PM at [address] [facility] with Dr. Mary Freitas

## 2025-03-11 NOTE — DISCHARGE NOTE PROVIDER - NSDCCPCAREPLAN_GEN_ALL_CORE_FT
PRINCIPAL DISCHARGE DIAGNOSIS  Diagnosis: Chest pain  Assessment and Plan of Treatment: Cardiac work up negative.  Echo with EF 75%.  Follow up with Dr. Ramirez as outpatient.  Follow up with Dr. Lyman within one week of discharge.      SECONDARY DISCHARGE DIAGNOSES  Diagnosis: Odynophagia  Assessment and Plan of Treatment: Underwent EGD 3/10/25 and noted with gastritis.  Biopsies obtained.  Follow up with gastroenterology as outpatient for esophageal mamometry.  Will also need colonoscopy as you are currently overdue.    Diagnosis: SIRS (systemic inflammatory response syndrome)  Assessment and Plan of Treatment: White blood cell count normalized.  Follow up with Rheumatology as outpatient. Inflammatory markers elevated (ESR, CRP, ferritin)  Please call to schedule your appointment.    Diagnosis: Anemia  Assessment and Plan of Treatment: Follow up with Gastroenterology as outpatient for Colonoscopy.    Diagnosis: Neck pain  Assessment and Plan of Treatment: MRI of cervical spine as outpatient for neck pain with radicular symptoms.

## 2025-03-11 NOTE — PROGRESS NOTE ADULT - SUBJECTIVE AND OBJECTIVE BOX
DATE OF SERVICE: 03-11-25 @ 11:00    Patient is a 64y old  Female who presents with a chief complaint of odynophagia, cough, LAW, weight loss (11 Mar 2025 08:52)      INTERVAL HISTORY: Feels ok.     REVIEW OF SYSTEMS:  CONSTITUTIONAL: No weakness  EYES/ENT: No visual changes;  No throat pain   NECK: No pain or stiffness  RESPIRATORY: No cough, wheezing; No shortness of breath  CARDIOVASCULAR: No chest pain or palpitations  GASTROINTESTINAL: No abdominal  pain. No nausea, vomiting, or hematemesis  GENITOURINARY: No dysuria, frequency or hematuria  NEUROLOGICAL: No stroke like symptoms  SKIN: No rashes    TELEMETRY Personally reviewed: SR/ST 80-130s  	  MEDICATIONS:        PHYSICAL EXAM:  T(C): 36.8 (03-11-25 @ 04:27), Max: 36.8 (03-11-25 @ 04:27)  HR: 83 (03-11-25 @ 04:27) (83 - 114)  BP: 115/69 (03-11-25 @ 04:27) (104/51 - 130/77)  RR: 18 (03-11-25 @ 04:27) (18 - 23)  SpO2: 98% (03-11-25 @ 04:27) (96% - 98%)  Wt(kg): --  I&O's Summary    10 Mar 2025 07:01  -  11 Mar 2025 07:00  --------------------------------------------------------  IN: 520 mL / OUT: 250 mL / NET: 270 mL    11 Mar 2025 07:01  -  11 Mar 2025 11:00  --------------------------------------------------------  IN: 240 mL / OUT: 0 mL / NET: 240 mL          Appearance: In no distress	  HEENT:    PERRL, EOMI	  Cardiovascular:  S1 S2, No JVD  Respiratory: Lungs clear to auscultation	  Gastrointestinal:  Soft, Non-tender, + BS	  Vascularature:  No edema of LE  Psychiatric: Appropriate affect   Neuro: no acute focal deficits                               7.7    7.39  )-----------( 323      ( 11 Mar 2025 06:47 )             25.1     03-10    134[L]  |  97  |  7   ----------------------------<  92  3.5   |  24  |  0.54    Ca    8.9      10 Mar 2025 04:44          Labs personally reviewed        < from: TTE W or WO Ultrasound Enhancing Agent (03.11.25 @ 09:38) >   1. Left ventricular cavity is small. Left ventricular wall thickness is normal. Left ventricular systolic function is hyperdynamic with an ejection fraction visually estimated at >75 %. There are no regional wall motion abnormalities seen.   2. Normal left ventricular diastolic function, with normal left ventricular filling pressure.   3. Normal right ventricular cavity size, with normal wall thickness, and normal right ventricular systolic function.   4. Normal left and right atrial size.   5. No significant valvular disease.   6. Small pericardial effusion noted adjacent to the right ventricle with no echocardiographic evidence of tamponade physiology.    ASSESSMENT/PLAN: 	    64F with HTN, MAURICIO, elevated NORMA on OP lab, recent ER visits for PNA and dysphagia sxs. She was at Dr. Lyman office today noted to be hypoxic, tachycardiac, and reported + 40 lbs wt loss. She was sent in to the ER for concern of thromboembolic event.     -CTA neg for PE   -duplex neg for DVT   -CTAP neg for gross intra-abd pathologies   -Rheum eval for high inflammatory markers   -GI eval for dysphagia, s/p EGD and colonoscopy 3/10  -TTE shows EF >75%, no WMA, normal LV filling pressures, small pericardial effusion with no tamponade  - remains tachycardiac, likely due to decreased po intake, will monitor  - no cardiac sxs   - s/p EGD 3/10 with normal findings         SHANTI Michael DO [facility]  Cardiovascular Medicine  [address], [address]  Available through call or text on [employer]  Office: [phone]

## 2025-03-11 NOTE — DISCHARGE NOTE PROVIDER - NSDCHOSPICE_GEN_A_CORE
Clinician waited on Amwell for 15 minutes; the patient was a no show for this scheduled appointment. Follow-up appointment is scheduled on 11/22/24.   No

## 2025-03-11 NOTE — DISCHARGE NOTE NURSING/CASE MANAGEMENT/SOCIAL WORK - NSDCFUADDAPPT_GEN_ALL_CORE_FT
APPTS ARE READY TO BE MADE: [X] YES    Best Family or Patient Contact (if needed):    Additional Information about above appointments (if needed):    1: Dr. Ramirez  2: Dr. House  3: Dr. Freitas    Other comments or requests:

## 2025-03-11 NOTE — DISCHARGE NOTE PROVIDER - NSDCFUSCHEDAPPT_GEN_ALL_CORE_FT
[facility]  CARDSTRES [address]   Scheduled Appointment: 04/25/2025    Katy Ramirez  [facility]  CARDIOLOGY [address]  Scheduled Appointment: 05/21/2025     Mary Chen  [facility] Phys[facility]  GASTRO [address]  Scheduled Appointment: 04/02/2025    Tiera House  [facility] Physician [facility]  RHEUM [address]  Scheduled Appointment: 04/03/2025    Nieves Lyman  [facility] Physician [facility]  INTMED [address]  Scheduled Appointment: 04/03/2025    [facility]  CARDSTRES [address]   Scheduled Appointment: 04/14/2025    Katy Ramirez  [facility]  CARDIOLOGY [address]  Scheduled Appointment: 05/21/2025

## 2025-03-11 NOTE — PROGRESS NOTE ADULT - REASON FOR ADMISSION
odynophagia, cough, LAW, weight loss
no

## 2025-03-11 NOTE — DISCHARGE NOTE NURSING/CASE MANAGEMENT/SOCIAL WORK - PATIENT PORTAL LINK FT
You can access the FollowMyHealth Patient Portal offered by [facility] by registering at the following website: [email or web address]. By joining [employer]’s FollowMyHealth portal, you will also be able to view your health information using other applications (apps) compatible with our system.

## 2025-03-11 NOTE — DISCHARGE NOTE PROVIDER - PROVIDER TOKENS
PROVIDER:[TOKEN:[[id number]],FOLLOWUP:[1 week]],PROVIDER:[TOKEN:[[id number]],FOLLOWUP:[2 weeks]],PROVIDER:[TOKEN:[[id number]],FOLLOWUP:[2 weeks]],PROVIDER:[TOKEN:[[id number]]]

## 2025-03-11 NOTE — PHYSICAL THERAPY INITIAL EVALUATION ADULT - ADDITIONAL COMMENTS
Pt resides alone in a house with +1 flight of stairs. PTA, pt was independent with all mobility/ADLs without the use of DME. Pt reports that her sister will be staying with her upon d/c for a week

## 2025-03-11 NOTE — PHYSICAL THERAPY INITIAL EVALUATION ADULT - NSPTDMEREC_GEN_A_CORE
Pt will require a rolling walker at home due to diagnosis of deconditioning causing decreased balance and unsteadiness during ambulation to help complete their MRADLs./rolling walker

## 2025-03-11 NOTE — DISCHARGE NOTE PROVIDER - ATTENDING DISCHARGE PHYSICAL EXAMINATION:
Seen at bedside. NAD.  Vital Signs Last 24 Hrs  T(C): 36.8 (11 Mar 2025 11:00), Max: 36.8 (11 Mar 2025 04:27)  T(F): 98.3 (11 Mar 2025 11:00), Max: 98.3 (11 Mar 2025 11:00)  HR: 86 (11 Mar 2025 11:00) (83 - 114)  BP: 118/77 (11 Mar 2025 11:00) (115/69 - 125/70)  BP(mean): --  RR: 18 (11 Mar 2025 11:00) (18 - 18)  SpO2: 98% (11 Mar 2025 11:00) (96% - 98%)    Parameters below as of 11 Mar 2025 11:00  Patient On (Oxygen Delivery Method): room air        CONSTITUTIONAL: NAD, well-developed, well-groomed  EYES: PERRLA; conjunctiva and sclera clear  ENMT: Moist oral mucosa, no pharyngeal injection or exudates;   NECK: Supple, no palpable masses;  RESPIRATORY: Normal respiratory effort; lungs are clear to auscultation bilaterally  CARDIOVASCULAR: Regular rate and rhythm, normal S1 and S2, no murmur/rub/gallop; No lower extremity edema;   ABDOMEN: Nontender to palpation, normoactive bowel sounds, no rebound/guarding;  MUSCULOSKELETAL: no clubbing or cyanosis of digits; no joint swelling or tenderness to palpation  PSYCH: A+O to person, place, and time; affect appropriate  NEUROLOGY: CN 2-12 are intact and symmetric; no gross sensory deficits   SKIN: No rashes; no palpable lesions      Needs esophageal manometry as outpatient. Discussed in detail.

## 2025-03-11 NOTE — PROGRESS NOTE ADULT - ATTENDING COMMENTS
Agree w above. Dysphagia/odynophagia with normal esophagus on EGD. Awaiting biopsies. Will need outpatient esophageal motility testing. PPI tx. Please reconsult GI as needed.

## 2025-03-11 NOTE — DISCHARGE NOTE PROVIDER - CARE PROVIDER_API CALL
Nieves Lyman  Internal Medicine  [address] [facility], [address]  [place] [address]  Phone: [phone]  Fax: [phone]  Follow Up Time: 1 week    Katy Ramirez  Cardiovascular Disease  [address], [address] [place] [address]  Phone: [phone]  Fax: [phone]  Follow Up Time: 2 weeks    Tiera House  Rheumatology  [address] [facility], [facility] [address]  [place] [address]  Phone: [phone]  Fax: [phone]  Follow Up Time: 2 weeks    Mary Chen  Gastroenterology  [address] [facility], [facility] [address]  [place] [address]  Phone: [phone]  Fax: [phone]  Follow Up Time:

## 2025-03-11 NOTE — DISCHARGE NOTE PROVIDER - CARE PROVIDERS DIRECT ADDRESSES
,[email or web address]

## 2025-03-11 NOTE — DISCHARGE NOTE NURSING/CASE MANAGEMENT/SOCIAL WORK - NSDCPEFALRISK_GEN_ALL_CORE
For information on Fall & Injury Prevention, visit: [email or web address] OR  [email or web address] OR  https://www.cdc.gov/steadi/patient.html

## 2025-03-11 NOTE — DISCHARGE NOTE PROVIDER - HOSPITAL COURSE
HPI:  64F estate planning , radha hx HTN, MAURICIO (last IV iron 1 mo ago), elevated NORMA 1:1280 of unknown etiol, recent PNA (Jan '25) c/b subsequent odynophagia, sinus tachycardia, presents from PMD's office with odynophagia, cough, LAW, weight loss.    Pt reports 2-3 months of odynophagia, pain at back of throat and upper chest, whenever she swallows solids/liquids, which has prevented her from eating or drinking much. Reports 30 lb weight loss over the past 2 months, along with dehydration. Last EGD/cscope was in 2011. Pt went to see an ENT doctor who scoped the back of throat, and told her she has "infection" back there, and gave her 21 days of diflucan, which she completed, which did not resolve her pain, unclear if it improved her symptoms.  In Jan '25, pt had "PNA" for which she was given azithro, ?augmentin, 5 days of steroids. While she was taking these medications, pt had one episode of , for which she went to the hospital. Her HR did not go as high as that subsequently, but since then, she has had HR in the 90s-110s persistently, which is higher than her normal baseline of 60s. Other symptom is 1 month of cough that brings up white sputum, and LAW.  Denies chest pain, fevers, chills, diarrhea, urinary symptoms. Reports intermittent "abdominal cramping". Last reports she had IV iron infusion 1 month ago at outpt office. Denies night sweats.  Pt reports having b/l LE duplex a few weeks ago. TTE Dec '24 showing EF 73% grossly normal heart function. barium esophagram on 3/5/25 @ [facility]. last stress test was in 2021. (08 Mar 2025 03:10)    Hospital Course:  Cardiovascular: CTA chest negative for pulmonary embolism. Duplex ultrasound negative for deep vein thrombosis. TTE revealed an ejection fraction >75%, no wall motion abnormalities, normal left ventricular filling pressures, and a small pericardial effusion without tamponade. Tachycardia persisted, likely due to decreased oral intake, and was monitored. The patient denied cardiac symptoms.    Gastrointestinal: EGD performed revealed normal appearing esophagus and gastritis. Biopsies were taken. Colonoscopy was deferred due to odynophagia and difficulty tolerating bowel preparation. The patient was placed on a soft, bite-sized diet.    Pulmonary: CT chest showed atelectasis, but no parenchymal disease or lymphadenopathy.    Rheumatologic: Rheumatology was consulted for evaluation of elevated inflammatory markers and NORMA. Systemic sclerosis, Sjogren's syndrome, and SLE were considered, though the patient lacked other clinical findings suggestive of these diagnoses aside from GI manifestations. Further serologies pending.  Outpaitent follow up with Rheum.    Musculoskeletal: The patient reported myalgias, particularly in the left upper extremity. Creatine kinase was within normal limits. Myoglobin and aldolase were ordered to further investigate for myositis. MRI of the cervical spine to be considered as o/p for neck pain and possible radicular symptoms.    Important Medication Changes and Reason:    Active or Pending Issues Requiring Follow-up:  Rheum f/u further testing and results of  inpatient  GI f/u for mamometry and colonoscopy    Advanced Directives:   [x] Full code  [ ] DNR  [ ] Hospice    Discharge Diagnoses:  Chest pain  SIRS  Odonophagia  Anemia

## 2025-03-11 NOTE — PROGRESS NOTE ADULT - ASSESSMENT
64F with HTN, MAURICIO, elevated NORMA on OP lab, recent ER visits for PNA and dysphagia sxs. She was at Dr. Lyman office today noted to be hypoxic, tachycardiac, and reported + 40 lbs wt loss. She was sent in to the ER for concern of thromboembolic event.     -CTA neg for PE   -duplex neg for DVT   -CTAP neg for gross intra-abd pathologies   -Rheum eval for high inflammatory markers   -GI eval for dysphagia, pending EGD and colonoscopy   -TTE pending, echo in 2024 unremarkable   -remains tachycardiac, likely due to decreased po intake   -no cardiac sxs     Alex[facility] Bennett MD [facility]  Attending Cardiologist, Wali-NS/GRETCHEN.   Avaliable on [employer] Team
64F with HTN, MAURICIO, elevated NORMA on OP lab, recent ER visits for PNA and dysphagia sxs. She was at Dr. Lyman office today noted to be hypoxic, tachycardiac, and reported + 40 lbs wt loss. She was sent in to the ER for concern of thromboembolic event.     -CTA neg for PE   -duplex neg for DVT   -CTAP neg for gross intra-abd pathologies   -Rheum eval for high inflammatory markers   -GI eval for dysphagia, pending EGD and colonoscopy   -TTE pending, echo in 2024 unremarkable   -remains tachycardiac, likely due to decreased po intake   -no cardiac sxs     Alexandro Bennett MD [facility]  Attending Cardiologist, Wali-NS/GRETCHEN.   Avaliable on [employer] Team  
63 yo F with PMH of HTN, MAURICIO, and recent pneumonia presenting for odynophagia, dysphagia, dyspnea, and weakness for 2 months, found to have elevated CRP, microcytic anemia, and positive NORMA c/f systemic process.     Impression:  #Odynophagia, Dysphagia  #Iron Deficiency Anemia  #Myalgias dyspnea, weakness  #Positive inflammatory markers    P/w systemic symptoms for 2-3 months with dyspnea, tachycardia, myalgias and weakness. She endorses dysphagia to solid>liquids and odynophagia with any swallowing even saliva for the same time period. On admission, pt found to have low grade temp to 100.2 and mild tachycardia with wbc 14.2. Labs notable for Hgb 8 (prior baseline 10.4 1/2025), MCV 73.2, , proteinuria, and albumin 2.9.  Workup at OSH notable for NORMA 1:1280. Barium esophagram at [facility] on 3/5 showed a few tertiary contractions commiserate with age and no stricture or mucosal abnormality. 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 Rheumatological disorders, such as scleroderma, Sjogren's syndrome, systemic lupus erythematosus, rheumatoid arthritis, sarcoidosis, Behcet's disease, anti-neutrophil cytoplasmic antibody (ANCA)-associated vasculitis, or granulomatosis with polyangiitis, have been associated with dysphagia. Dermatomyositis and polymyositis also on ddx. Structural cause such as adenocarcinoma also possible though systemic cause more likely given pt's symptoms. Thyroid disorder also on ddx. 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 Her last EGD/colonoscopy was 2011 which showed gastritis and hemorrhoids but she has not had repeat evaluation since she was found to have MAURICIO. Iron deficiency anemia raises c/f chronic blood loss due to GI source, warranting evaluation. Pt currently hemodynamically stable in no distress.     S/P EGD 3/10, normal appearing esophagus and gastritis, biopsied.     Recommendations   -appreciate rheumatology workup and recs   -FU pathology from endoscopy  -outpatient manometry   - [place]ants colonoscopy for MAURICIO, discussed w/ pt and will defer to outpatient setting due to odynophagia limiting ability to tolerate prep  - Soft, bite sized diet    Note incomplete until finalized by attending signature/attestation.    Sammi Albarado  GI/Hepatology Fellow    MONDAY-FRIDAY 8AM-5PM:  Pager# [phone] ([facility]) or [phone] ([facility])    NON-URGENT CONSULTS:  Please email [email or web address]  AT NIGHT AND ON WEEKENDS:  Contact on-call GI fellow from 5pm-8am and on weekends/holidays    
64F estate planning , c hx HTN, MAURICIO (last IV iron 1 mo ago), elevated NORMA 1:1280 of unknown etiol, recent PNA (Jan '25) c/b subsequent odynophagia, sinus tachycardia, presents from PMD's office with odynophagia, cough, LAW, weight loss.
64F estate planning , c hx HTN, MAURICIO (last IV iron 1 mo ago), elevated NORMA 1:1280 of unknown etiol, recent PNA (Jan '25) c/b subsequent odynophagia, sinus tachycardia, presents from PMD's office with odynophagia, cough, LAW, weight loss.

## 2025-03-12 ENCOUNTER — NON-APPOINTMENT (OUTPATIENT)
Age: 65
End: 2025-03-12

## 2025-03-13 ENCOUNTER — APPOINTMENT (OUTPATIENT)
Dept: INTERNAL MEDICINE | Facility: CLINIC | Age: 65
End: 2025-03-13
Payer: COMMERCIAL

## 2025-03-13 ENCOUNTER — NON-APPOINTMENT (OUTPATIENT)
Age: 65
End: 2025-03-13

## 2025-03-13 VITALS
RESPIRATION RATE: 16 BRPM | WEIGHT: 117 LBS | TEMPERATURE: 98.1 F | DIASTOLIC BLOOD PRESSURE: 60 MMHG | BODY MASS INDEX: 21.53 KG/M2 | SYSTOLIC BLOOD PRESSURE: 106 MMHG | HEART RATE: 103 BPM | OXYGEN SATURATION: 98 % | HEIGHT: 62 IN

## 2025-03-13 VITALS — HEART RATE: 98 BPM

## 2025-03-13 VITALS — SYSTOLIC BLOOD PRESSURE: 100 MMHG | DIASTOLIC BLOOD PRESSURE: 60 MMHG

## 2025-03-13 DIAGNOSIS — R13.10 DYSPHAGIA, UNSPECIFIED: ICD-10-CM

## 2025-03-13 DIAGNOSIS — I10 ESSENTIAL (PRIMARY) HYPERTENSION: ICD-10-CM

## 2025-03-13 DIAGNOSIS — R07.89 OTHER CHEST PAIN: ICD-10-CM

## 2025-03-13 DIAGNOSIS — G89.29 PAIN IN RIGHT ANKLE AND JOINTS OF RIGHT FOOT: ICD-10-CM

## 2025-03-13 DIAGNOSIS — M25.571 PAIN IN RIGHT ANKLE AND JOINTS OF RIGHT FOOT: ICD-10-CM

## 2025-03-13 DIAGNOSIS — R00.0 TACHYCARDIA, UNSPECIFIED: ICD-10-CM

## 2025-03-13 DIAGNOSIS — R09.02 HYPOXEMIA: ICD-10-CM

## 2025-03-13 DIAGNOSIS — Z12.9 ENCOUNTER FOR SCREENING FOR MALIGNANT NEOPLASM, SITE UNSPECIFIED: ICD-10-CM

## 2025-03-13 DIAGNOSIS — D50.9 IRON DEFICIENCY ANEMIA, UNSPECIFIED: ICD-10-CM

## 2025-03-13 DIAGNOSIS — R76.8 OTHER SPECIFIED ABNORMAL IMMUNOLOGICAL FINDINGS IN SERUM: ICD-10-CM

## 2025-03-13 DIAGNOSIS — K21.9 GASTRO-ESOPHAGEAL REFLUX DISEASE W/OUT ESOPHAGITIS: ICD-10-CM

## 2025-03-13 DIAGNOSIS — R63.4 ABNORMAL WEIGHT LOSS: ICD-10-CM

## 2025-03-13 DIAGNOSIS — R10.9 UNSPECIFIED ABDOMINAL PAIN: ICD-10-CM

## 2025-03-13 DIAGNOSIS — I31.39 OTHER PERICARDIAL EFFUSION (NONINFLAMMATORY): ICD-10-CM

## 2025-03-13 LAB
CULTURE RESULTS: SIGNIFICANT CHANGE UP
CULTURE RESULTS: SIGNIFICANT CHANGE UP
SPECIMEN SOURCE: SIGNIFICANT CHANGE UP
SPECIMEN SOURCE: SIGNIFICANT CHANGE UP

## 2025-03-13 PROCEDURE — 93000 ELECTROCARDIOGRAM COMPLETE: CPT

## 2025-03-13 PROCEDURE — 99496 TRANSJ CARE MGMT HIGH F2F 7D: CPT

## 2025-03-13 RX ORDER — FLUCONAZOLE 100 MG/1
100 TABLET ORAL
Qty: 1 | Refills: 0 | Status: DISCONTINUED | COMMUNITY
Start: 2025-03-07 | End: 2025-03-13

## 2025-03-17 ENCOUNTER — NON-APPOINTMENT (OUTPATIENT)
Age: 65
End: 2025-03-17

## 2025-03-17 DIAGNOSIS — R06.02 SHORTNESS OF BREATH: ICD-10-CM

## 2025-03-17 LAB — T4 FREE SERPL DIALY-MCNC: 0.88 NG/DL — SIGNIFICANT CHANGE UP

## 2025-03-24 ENCOUNTER — APPOINTMENT (OUTPATIENT)
Dept: CT IMAGING | Facility: CLINIC | Age: 65
End: 2025-03-24

## 2025-04-02 ENCOUNTER — APPOINTMENT (OUTPATIENT)
Dept: GASTROENTEROLOGY | Facility: CLINIC | Age: 65
End: 2025-04-02
Payer: COMMERCIAL

## 2025-04-02 PROCEDURE — G2211 COMPLEX E/M VISIT ADD ON: CPT | Mod: NC

## 2025-04-02 PROCEDURE — 99213 OFFICE O/P EST LOW 20 MIN: CPT

## 2025-04-03 ENCOUNTER — APPOINTMENT (OUTPATIENT)
Dept: RHEUMATOLOGY | Facility: CLINIC | Age: 65
End: 2025-04-03
Payer: COMMERCIAL

## 2025-04-03 ENCOUNTER — APPOINTMENT (OUTPATIENT)
Dept: INTERNAL MEDICINE | Facility: CLINIC | Age: 65
End: 2025-04-03
Payer: COMMERCIAL

## 2025-04-03 ENCOUNTER — LABORATORY RESULT (OUTPATIENT)
Age: 65
End: 2025-04-03

## 2025-04-03 VITALS
SYSTOLIC BLOOD PRESSURE: 122 MMHG | HEART RATE: 116 BPM | WEIGHT: 119 LBS | DIASTOLIC BLOOD PRESSURE: 81 MMHG | BODY MASS INDEX: 21.9 KG/M2 | OXYGEN SATURATION: 99 % | HEIGHT: 62 IN

## 2025-04-03 VITALS
OXYGEN SATURATION: 98 % | HEART RATE: 111 BPM | HEIGHT: 62 IN | DIASTOLIC BLOOD PRESSURE: 80 MMHG | TEMPERATURE: 98.8 F | BODY MASS INDEX: 21.35 KG/M2 | WEIGHT: 116 LBS | SYSTOLIC BLOOD PRESSURE: 132 MMHG

## 2025-04-03 VITALS — HEART RATE: 109 BPM

## 2025-04-03 DIAGNOSIS — R07.89 OTHER CHEST PAIN: ICD-10-CM

## 2025-04-03 DIAGNOSIS — Z13.228 ENCOUNTER FOR SCREENING FOR OTHER METABOLIC DISORDERS: ICD-10-CM

## 2025-04-03 DIAGNOSIS — R13.12 DYSPHAGIA, OROPHARYNGEAL PHASE: ICD-10-CM

## 2025-04-03 DIAGNOSIS — R63.4 ABNORMAL WEIGHT LOSS: ICD-10-CM

## 2025-04-03 DIAGNOSIS — R00.0 TACHYCARDIA, UNSPECIFIED: ICD-10-CM

## 2025-04-03 DIAGNOSIS — R10.9 UNSPECIFIED ABDOMINAL PAIN: ICD-10-CM

## 2025-04-03 DIAGNOSIS — I31.39 OTHER PERICARDIAL EFFUSION (NONINFLAMMATORY): ICD-10-CM

## 2025-04-03 DIAGNOSIS — R76.8 OTHER SPECIFIED ABNORMAL IMMUNOLOGICAL FINDINGS IN SERUM: ICD-10-CM

## 2025-04-03 DIAGNOSIS — D50.9 IRON DEFICIENCY ANEMIA, UNSPECIFIED: ICD-10-CM

## 2025-04-03 DIAGNOSIS — M25.571 PAIN IN RIGHT ANKLE AND JOINTS OF RIGHT FOOT: ICD-10-CM

## 2025-04-03 DIAGNOSIS — Z12.9 ENCOUNTER FOR SCREENING FOR MALIGNANT NEOPLASM, SITE UNSPECIFIED: ICD-10-CM

## 2025-04-03 DIAGNOSIS — I10 ESSENTIAL (PRIMARY) HYPERTENSION: ICD-10-CM

## 2025-04-03 DIAGNOSIS — K21.9 GASTRO-ESOPHAGEAL REFLUX DISEASE W/OUT ESOPHAGITIS: ICD-10-CM

## 2025-04-03 DIAGNOSIS — G89.29 PAIN IN RIGHT ANKLE AND JOINTS OF RIGHT FOOT: ICD-10-CM

## 2025-04-03 PROBLEM — M25.50 CHRONIC JOINT PAIN: Status: ACTIVE | Noted: 2025-04-03

## 2025-04-03 PROCEDURE — 99214 OFFICE O/P EST MOD 30 MIN: CPT

## 2025-04-03 PROCEDURE — G2211 COMPLEX E/M VISIT ADD ON: CPT | Mod: NC

## 2025-04-03 PROCEDURE — 99215 OFFICE O/P EST HI 40 MIN: CPT

## 2025-04-04 ENCOUNTER — LABORATORY RESULT (OUTPATIENT)
Age: 65
End: 2025-04-04

## 2025-04-04 LAB
25(OH)D3 SERPL-MCNC: 67.5 NG/ML
APPEARANCE: CLEAR
BACTERIA: NEGATIVE /HPF
BILIRUBIN URINE: NEGATIVE
BLOOD URINE: NEGATIVE
CALCIUM SERPL-MCNC: 9 MG/DL
CAST: 2 /LPF
CCP AB SER IA-ACNC: <8 U/ML
CHOLEST SERPL-MCNC: 139 MG/DL
CK SERPL-CCNC: 62 U/L
COLOR: NORMAL
CREAT SPEC-SCNC: 199 MG/DL
CREAT SPEC-SCNC: 199 MG/DL
CREAT/PROT UR: 0.3 RATIO
DEPRECATED KAPPA LC FREE/LAMBDA SER: 0.2 RATIO
EPITHELIAL CELLS: 4 /HPF
ESTIMATED AVERAGE GLUCOSE: 100 MG/DL
FERRITIN SERPL-MCNC: 1316 NG/ML
FOLATE SERPL-MCNC: 8.5 NG/ML
GLUCOSE QUALITATIVE U: NEGATIVE MG/DL
HBA1C MFR BLD HPLC: 5.1 %
HDLC SERPL-MCNC: 29 MG/DL
IGG SER QL IEP: 2431 MG/DL
IGG1 SER-MCNC: 1847 MG/DL
IGG2 SER-MCNC: 316 MG/DL
IGG3 SER-MCNC: 24 MG/DL
IGG4 SER-MCNC: 55.3 MG/DL
IRON SATN MFR SERPL: 12 %
IRON SERPL-MCNC: 16 UG/DL
KAPPA LC CSF-MCNC: 27.42 MG/DL
KAPPA LC SERPL-MCNC: 5.6 MG/DL
KETONES URINE: NEGATIVE MG/DL
LDLC SERPL-MCNC: 85 MG/DL
LEUKOCYTE ESTERASE URINE: NEGATIVE
MICROALBUMIN 24H UR DL<=1MG/L-MCNC: 2.4 MG/DL
MICROALBUMIN/CREAT 24H UR-RTO: 12 MG/G
MICROSCOPIC-UA: NORMAL
NITRITE URINE: NEGATIVE
NONHDLC SERPL-MCNC: 110 MG/DL
PARATHYROID HORMONE INTACT: 28 PG/ML
PH URINE: 6
PROT UR-MCNC: 52 MG/DL
PROTEIN URINE: 30 MG/DL
RED BLOOD CELLS URINE: 1 /HPF
RF+CCP IGG SER-IMP: NEGATIVE
RHEUMATOID FACT SER QL: <10 IU/ML
SPECIFIC GRAVITY URINE: 1.02
T4 FREE SERPL-MCNC: 1.1 NG/DL
TIBC SERPL-MCNC: 128 UG/DL
TRIGL SERPL-MCNC: 140 MG/DL
TSH SERPL-ACNC: 1.11 UIU/ML
UIBC SERPL-MCNC: 112 UG/DL
URATE SERPL-MCNC: 4.9 MG/DL
UROBILINOGEN URINE: 0.2 MG/DL
VIT B12 SERPL-MCNC: 1464 PG/ML
WHITE BLOOD CELLS URINE: 1 /HPF

## 2025-04-06 PROBLEM — R76.8 SS-A ANTIBODY POSITIVE: Status: ACTIVE | Noted: 2025-04-06

## 2025-04-06 LAB
DSDNA AB SER-ACNC: <1 IU/ML
ENA SS-A AB SER IA-ACNC: >8 AL
ENA SS-B AB SER IA-ACNC: <0.2 AL

## 2025-04-07 LAB
ALBUMIN MFR SERPL ELPH: 35.1 %
ALBUMIN SERPL-MCNC: 2.6 G/DL
ALBUMIN/GLOB SERPL: 0.5 RATIO
ALPHA1 GLOB MFR SERPL ELPH: 7.8 %
ALPHA1 GLOB SERPL ELPH-MCNC: 0.6 G/DL
ALPHA2 GLOB MFR SERPL ELPH: 15.8 %
ALPHA2 GLOB SERPL ELPH-MCNC: 1.2 G/DL
B-GLOBULIN MFR SERPL ELPH: 8.9 %
B-GLOBULIN SERPL ELPH-MCNC: 0.7 G/DL
GAMMA GLOB FLD ELPH-MCNC: 2.4 G/DL
GAMMA GLOB MFR SERPL ELPH: 32.4 %
INTERPRETATION SERPL IEP-IMP: NORMAL
M PROTEIN MFR SERPL ELPH: 14.5 %
M PROTEIN SPEC IFE-MCNC: NORMAL
MONOCLON BAND OBS SERPL: 1.1 G/DL
PROT SERPL-MCNC: 7.4 G/DL
PROT SERPL-MCNC: 7.4 G/DL

## 2025-04-08 LAB — ANA SER IF-ACNC: NEGATIVE

## 2025-04-09 DIAGNOSIS — G89.29 PAIN IN UNSPECIFIED JOINT: ICD-10-CM

## 2025-04-09 DIAGNOSIS — M25.50 PAIN IN UNSPECIFIED JOINT: ICD-10-CM

## 2025-04-09 LAB
ALBUPE: 8.1 %
ALPHA1UPE: 40.2 %
ALPHA2UPE: 23 %
BETAUPE: 13.5 %
GAMMAUPE: 15.2 %
IGA 24H UR QL IFE: NORMAL
KAPPA LC 24H UR QL: PRESENT
M SPIKE RU: NORMAL %
PROT PATTERN 24H UR ELPH-IMP: NORMAL
PROT UR-MCNC: 55 MG/DL
PROT UR-MCNC: 55 MG/DL

## 2025-04-09 RX ORDER — GABAPENTIN 100 MG/1
100 CAPSULE ORAL
Qty: 90 | Refills: 1 | Status: ACTIVE | COMMUNITY
Start: 2025-04-09 | End: 1900-01-01

## 2025-04-10 LAB
ALBUMIN SERPL ELPH-MCNC: 3 G/DL
ALP BLD-CCNC: 69 U/L
ALT SERPL-CCNC: 18 U/L
ANION GAP SERPL CALC-SCNC: 14 MMOL/L
AST SERPL-CCNC: 31 U/L
BASOPHILS # BLD AUTO: 0.13 K/UL
BASOPHILS NFR BLD AUTO: 0.8 %
BILIRUB SERPL-MCNC: 0.3 MG/DL
BUN SERPL-MCNC: 11 MG/DL
CALCIUM SERPL-MCNC: 8.6 MG/DL
CHLORIDE SERPL-SCNC: 94 MMOL/L
CO2 SERPL-SCNC: 24 MMOL/L
CREAT SERPL-MCNC: 0.61 MG/DL
CRP SERPL-MCNC: 82 MG/L
EGFRCR SERPLBLD CKD-EPI 2021: 100 ML/MIN/1.73M2
EOSINOPHIL # BLD AUTO: 0 K/UL
EOSINOPHIL NFR BLD AUTO: 0 %
ERYTHROCYTE [SEDIMENTATION RATE] IN BLOOD BY WESTERGREN METHOD: 108 MM/HR
HAV IGM SER QL: NONREACTIVE
HBV CORE IGG+IGM SER QL: NONREACTIVE
HBV CORE IGM SER QL: NONREACTIVE
HBV SURFACE AG SER QL: NONREACTIVE
HCT VFR BLD CALC: 29.7 %
HCV AB SER QL: NONREACTIVE
HCV S/CO RATIO: 0.45 S/CO
HGB BLD-MCNC: 9 G/DL
LYMPHOCYTES # BLD AUTO: 2.69 K/UL
LYMPHOCYTES NFR BLD AUTO: 16.7 %
M TB IFN-G BLD-IMP: NEGATIVE
MAN DIFF?: NORMAL
MCHC RBC-ENTMCNC: 23 PG
MCHC RBC-ENTMCNC: 30.3 G/DL
MCV RBC AUTO: 75.8 FL
MONOCYTES # BLD AUTO: 0.81 K/UL
MONOCYTES NFR BLD AUTO: 5 %
NEUTROPHILS # BLD AUTO: 12.24 K/UL
NEUTROPHILS NFR BLD AUTO: 75.9 %
PLATELET # BLD AUTO: 380 K/UL
POTASSIUM SERPL-SCNC: 4.7 MMOL/L
PROT SERPL-MCNC: 7.3 G/DL
QUANTIFERON TB PLUS MITOGEN MINUS NIL: 2.23 IU/ML
QUANTIFERON TB PLUS NIL: 0.04 IU/ML
QUANTIFERON TB PLUS TB1 MINUS NIL: 0.01 IU/ML
QUANTIFERON TB PLUS TB2 MINUS NIL: 0 IU/ML
RBC # BLD: 3.92 M/UL
RBC # FLD: 22.7 %
SODIUM SERPL-SCNC: 133 MMOL/L
WBC # FLD AUTO: 16.13 K/UL

## 2025-04-14 ENCOUNTER — APPOINTMENT (OUTPATIENT)
Dept: CV DIAGNOSTICS | Facility: HOSPITAL | Age: 65
End: 2025-04-14

## 2025-04-14 ENCOUNTER — RESULT REVIEW (OUTPATIENT)
Age: 65
End: 2025-04-14

## 2025-04-14 ENCOUNTER — OUTPATIENT (OUTPATIENT)
Dept: OUTPATIENT SERVICES | Facility: HOSPITAL | Age: 65
LOS: 1 days | End: 2025-04-14
Payer: COMMERCIAL

## 2025-04-14 ENCOUNTER — NON-APPOINTMENT (OUTPATIENT)
Age: 65
End: 2025-04-14

## 2025-04-14 DIAGNOSIS — R00.0 TACHYCARDIA, UNSPECIFIED: ICD-10-CM

## 2025-04-14 DIAGNOSIS — R47.9 UNSPECIFIED SPEECH DISTURBANCES: ICD-10-CM

## 2025-04-14 PROCEDURE — 93018 CV STRESS TEST I&R ONLY: CPT

## 2025-04-14 PROCEDURE — 78452 HT MUSCLE IMAGE SPECT MULT: CPT | Mod: MC

## 2025-04-14 PROCEDURE — 93016 CV STRESS TEST SUPVJ ONLY: CPT

## 2025-04-14 PROCEDURE — A9500: CPT

## 2025-04-14 PROCEDURE — 78452 HT MUSCLE IMAGE SPECT MULT: CPT | Mod: 26

## 2025-04-14 PROCEDURE — 93017 CV STRESS TEST TRACING ONLY: CPT

## 2025-04-15 RX ORDER — GABAPENTIN 400 MG/1
2 CAPSULE ORAL
Refills: 0 | DISCHARGE
Start: 2025-04-15 | End: 2025-04-17

## 2025-04-16 ENCOUNTER — APPOINTMENT (OUTPATIENT)
Dept: GASTROENTEROLOGY | Facility: HOSPITAL | Age: 65
End: 2025-04-16

## 2025-04-16 ENCOUNTER — NON-APPOINTMENT (OUTPATIENT)
Age: 65
End: 2025-04-16

## 2025-04-16 ENCOUNTER — INPATIENT (INPATIENT)
Facility: HOSPITAL | Age: 65
LOS: 5 days | Discharge: HOME HEALTH | DRG: 310 | End: 2025-04-22
Attending: STUDENT IN AN ORGANIZED HEALTH CARE EDUCATION/TRAINING PROGRAM | Admitting: STUDENT IN AN ORGANIZED HEALTH CARE EDUCATION/TRAINING PROGRAM
Payer: COMMERCIAL

## 2025-04-16 VITALS
TEMPERATURE: 101 F | OXYGEN SATURATION: 95 % | HEART RATE: 135 BPM | DIASTOLIC BLOOD PRESSURE: 84 MMHG | SYSTOLIC BLOOD PRESSURE: 142 MMHG | HEIGHT: 62 IN | WEIGHT: 119.93 LBS | RESPIRATION RATE: 22 BRPM

## 2025-04-16 DIAGNOSIS — R07.9 CHEST PAIN, UNSPECIFIED: ICD-10-CM

## 2025-04-16 DIAGNOSIS — R00.0 TACHYCARDIA, UNSPECIFIED: ICD-10-CM

## 2025-04-16 DIAGNOSIS — Z29.9 ENCOUNTER FOR PROPHYLACTIC MEASURES, UNSPECIFIED: ICD-10-CM

## 2025-04-16 DIAGNOSIS — D64.9 ANEMIA, UNSPECIFIED: ICD-10-CM

## 2025-04-16 DIAGNOSIS — E87.1 HYPO-OSMOLALITY AND HYPONATREMIA: ICD-10-CM

## 2025-04-16 DIAGNOSIS — R00.2 PALPITATIONS: ICD-10-CM

## 2025-04-16 DIAGNOSIS — R13.10 DYSPHAGIA, UNSPECIFIED: ICD-10-CM

## 2025-04-16 DIAGNOSIS — R65.10 SYSTEMIC INFLAMMATORY RESPONSE SYNDROME (SIRS) OF NON-INFECTIOUS ORIGIN WITHOUT ACUTE ORGAN DYSFUNCTION: ICD-10-CM

## 2025-04-16 LAB
ADD ON TEST-SPECIMEN IN LAB: SIGNIFICANT CHANGE UP
ADD ON TEST-SPECIMEN IN LAB: SIGNIFICANT CHANGE UP
ALBUMIN SERPL ELPH-MCNC: 2.6 G/DL — LOW (ref 3.3–5)
ALP SERPL-CCNC: 84 U/L — SIGNIFICANT CHANGE UP (ref 40–120)
ALT FLD-CCNC: 28 U/L — SIGNIFICANT CHANGE UP (ref 10–45)
ANION GAP SERPL CALC-SCNC: 15 MMOL/L — SIGNIFICANT CHANGE UP (ref 5–17)
ANISOCYTOSIS BLD QL: SIGNIFICANT CHANGE UP
APPEARANCE UR: CLEAR — SIGNIFICANT CHANGE UP
APTT BLD: 27.2 SEC — SIGNIFICANT CHANGE UP (ref 24.5–35.6)
AST SERPL-CCNC: 41 U/L — HIGH (ref 10–40)
BASOPHILS # BLD AUTO: 0 K/UL — SIGNIFICANT CHANGE UP (ref 0–0.2)
BASOPHILS NFR BLD AUTO: 0 % — SIGNIFICANT CHANGE UP (ref 0–2)
BILIRUB SERPL-MCNC: 0.4 MG/DL — SIGNIFICANT CHANGE UP (ref 0.2–1.2)
BILIRUB UR-MCNC: NEGATIVE — SIGNIFICANT CHANGE UP
BUN SERPL-MCNC: 10 MG/DL — SIGNIFICANT CHANGE UP (ref 7–23)
CALCIUM SERPL-MCNC: 8.7 MG/DL — SIGNIFICANT CHANGE UP (ref 8.4–10.5)
CHLORIDE SERPL-SCNC: 95 MMOL/L — LOW (ref 96–108)
CO2 SERPL-SCNC: 21 MMOL/L — LOW (ref 22–31)
COLOR SPEC: YELLOW — SIGNIFICANT CHANGE UP
CREAT ?TM UR-MCNC: 29 MG/DL — SIGNIFICANT CHANGE UP
CREAT SERPL-MCNC: 0.6 MG/DL — SIGNIFICANT CHANGE UP (ref 0.5–1.3)
DACRYOCYTES BLD QL SMEAR: SLIGHT — SIGNIFICANT CHANGE UP
DIFF PNL FLD: NEGATIVE — SIGNIFICANT CHANGE UP
EGFR: 101 ML/MIN/1.73M2 — SIGNIFICANT CHANGE UP
EGFR: 101 ML/MIN/1.73M2 — SIGNIFICANT CHANGE UP
ELLIPTOCYTES BLD QL SMEAR: SLIGHT — SIGNIFICANT CHANGE UP
EOSINOPHIL # BLD AUTO: 0 K/UL — SIGNIFICANT CHANGE UP (ref 0–0.5)
EOSINOPHIL NFR BLD AUTO: 0 % — SIGNIFICANT CHANGE UP (ref 0–6)
FLUAV AG NPH QL: SIGNIFICANT CHANGE UP
FLUBV AG NPH QL: SIGNIFICANT CHANGE UP
GAS PNL BLDV: SIGNIFICANT CHANGE UP
GLUCOSE SERPL-MCNC: 110 MG/DL — HIGH (ref 70–99)
GLUCOSE UR QL: NEGATIVE MG/DL — SIGNIFICANT CHANGE UP
HCT VFR BLD CALC: 24.3 % — LOW (ref 34.5–45)
HGB BLD-MCNC: 7.5 G/DL — LOW (ref 11.5–15.5)
HYPOCHROMIA BLD QL: SIGNIFICANT CHANGE UP
INR BLD: 1.21 RATIO — HIGH (ref 0.85–1.16)
KETONES UR-MCNC: NEGATIVE MG/DL — SIGNIFICANT CHANGE UP
LEUKOCYTE ESTERASE UR-ACNC: NEGATIVE — SIGNIFICANT CHANGE UP
LYMPHOCYTES # BLD AUTO: 0.55 K/UL — LOW (ref 1–3.3)
LYMPHOCYTES # BLD AUTO: 2.7 % — LOW (ref 13–44)
MANUAL SMEAR VERIFICATION: SIGNIFICANT CHANGE UP
MCHC RBC-ENTMCNC: 22.3 PG — LOW (ref 27–34)
MCHC RBC-ENTMCNC: 30.9 G/DL — LOW (ref 32–36)
MCV RBC AUTO: 72.1 FL — LOW (ref 80–100)
MICROCYTES BLD QL: SLIGHT — SIGNIFICANT CHANGE UP
MONOCYTES # BLD AUTO: 0.55 K/UL — SIGNIFICANT CHANGE UP (ref 0–0.9)
MONOCYTES NFR BLD AUTO: 2.7 % — SIGNIFICANT CHANGE UP (ref 2–14)
NEUTROPHILS # BLD AUTO: 19.33 K/UL — HIGH (ref 1.8–7.4)
NEUTROPHILS NFR BLD AUTO: 93.7 % — HIGH (ref 43–77)
NEUTS BAND # BLD: 0.9 % — SIGNIFICANT CHANGE UP (ref 0–8)
NEUTS BAND NFR BLD: 0.9 % — SIGNIFICANT CHANGE UP (ref 0–8)
NITRITE UR-MCNC: NEGATIVE — SIGNIFICANT CHANGE UP
OSMOLALITY UR: 340 MOS/KG — SIGNIFICANT CHANGE UP (ref 300–900)
PH UR: 8 — SIGNIFICANT CHANGE UP (ref 5–8)
PLAT MORPH BLD: NORMAL — SIGNIFICANT CHANGE UP
PLATELET # BLD AUTO: 341 K/UL — SIGNIFICANT CHANGE UP (ref 150–400)
POIKILOCYTOSIS BLD QL AUTO: SLIGHT — SIGNIFICANT CHANGE UP
POLYCHROMASIA BLD QL SMEAR: SLIGHT — SIGNIFICANT CHANGE UP
POTASSIUM SERPL-MCNC: 4.5 MMOL/L — SIGNIFICANT CHANGE UP (ref 3.5–5.3)
POTASSIUM SERPL-SCNC: 4.5 MMOL/L — SIGNIFICANT CHANGE UP (ref 3.5–5.3)
PROT SERPL-MCNC: 7.3 G/DL — SIGNIFICANT CHANGE UP (ref 6–8.3)
PROT UR-MCNC: NEGATIVE MG/DL — SIGNIFICANT CHANGE UP
PROTHROM AB SERPL-ACNC: 13.9 SEC — HIGH (ref 9.9–13.4)
RBC # BLD: 3.37 M/UL — LOW (ref 3.8–5.2)
RBC # FLD: 20.9 % — HIGH (ref 10.3–14.5)
RBC BLD AUTO: ABNORMAL
RSV RNA NPH QL NAA+NON-PROBE: SIGNIFICANT CHANGE UP
SARS-COV-2 RNA SPEC QL NAA+PROBE: SIGNIFICANT CHANGE UP
SCHISTOCYTES BLD QL AUTO: SLIGHT — SIGNIFICANT CHANGE UP
SODIUM SERPL-SCNC: 131 MMOL/L — LOW (ref 135–145)
SODIUM UR-SCNC: 79 MMOL/L — SIGNIFICANT CHANGE UP
SOURCE RESPIRATORY: SIGNIFICANT CHANGE UP
SP GR SPEC: 1.02 — SIGNIFICANT CHANGE UP (ref 1–1.03)
T3 SERPL-MCNC: 99 NG/DL — SIGNIFICANT CHANGE UP (ref 80–200)
T4 AB SER-ACNC: 6.5 UG/DL — SIGNIFICANT CHANGE UP (ref 4.6–12)
T4 FREE SERPL-MCNC: 1.2 NG/DL — SIGNIFICANT CHANGE UP (ref 0.9–1.8)
TARGETS BLD QL SMEAR: SIGNIFICANT CHANGE UP
TROPONIN T, HIGH SENSITIVITY RESULT: 33 NG/L — SIGNIFICANT CHANGE UP (ref 0–51)
TROPONIN T, HIGH SENSITIVITY RESULT: 52 NG/L — HIGH (ref 0–51)
TROPONIN T, HIGH SENSITIVITY RESULT: 54 NG/L — HIGH (ref 0–51)
TSH SERPL-MCNC: 2.15 UIU/ML — SIGNIFICANT CHANGE UP (ref 0.27–4.2)
UROBILINOGEN FLD QL: 0.2 MG/DL — SIGNIFICANT CHANGE UP (ref 0.2–1)
WBC # BLD: 20.43 K/UL — HIGH (ref 3.8–10.5)
WBC # FLD AUTO: 20.43 K/UL — HIGH (ref 3.8–10.5)

## 2025-04-16 PROCEDURE — 71045 X-RAY EXAM CHEST 1 VIEW: CPT | Mod: 26

## 2025-04-16 PROCEDURE — 93970 EXTREMITY STUDY: CPT | Mod: 26

## 2025-04-16 PROCEDURE — 99285 EMERGENCY DEPT VISIT HI MDM: CPT

## 2025-04-16 PROCEDURE — 71275 CT ANGIOGRAPHY CHEST: CPT | Mod: 26

## 2025-04-16 PROCEDURE — 93308 TTE F-UP OR LMTD: CPT | Mod: 26

## 2025-04-16 PROCEDURE — 99223 1ST HOSP IP/OBS HIGH 75: CPT

## 2025-04-16 PROCEDURE — 99222 1ST HOSP IP/OBS MODERATE 55: CPT

## 2025-04-16 PROCEDURE — 74177 CT ABD & PELVIS W/CONTRAST: CPT | Mod: 26

## 2025-04-16 PROCEDURE — 93010 ELECTROCARDIOGRAM REPORT: CPT | Mod: 76

## 2025-04-16 PROCEDURE — G0545: CPT

## 2025-04-16 RX ORDER — HEPARIN SODIUM 1000 [USP'U]/ML
5000 INJECTION INTRAVENOUS; SUBCUTANEOUS EVERY 8 HOURS
Refills: 0 | Status: DISCONTINUED | OUTPATIENT
Start: 2025-04-16 | End: 2025-04-22

## 2025-04-16 RX ORDER — DILTIAZEM HYDROCHLORIDE 120 MG/1
5 CAPSULE, EXTENDED RELEASE ORAL ONCE
Refills: 0 | Status: COMPLETED | OUTPATIENT
Start: 2025-04-16 | End: 2025-04-16

## 2025-04-16 RX ORDER — DILTIAZEM HYDROCHLORIDE 120 MG/1
10 CAPSULE, EXTENDED RELEASE ORAL ONCE
Refills: 0 | Status: DISCONTINUED | OUTPATIENT
Start: 2025-04-16 | End: 2025-04-16

## 2025-04-16 RX ORDER — METOPROLOL SUCCINATE 50 MG/1
50 TABLET, EXTENDED RELEASE ORAL DAILY
Refills: 0 | Status: DISCONTINUED | OUTPATIENT
Start: 2025-04-16 | End: 2025-04-22

## 2025-04-16 RX ORDER — GABAPENTIN 400 MG/1
200 CAPSULE ORAL AT BEDTIME
Refills: 0 | Status: DISCONTINUED | OUTPATIENT
Start: 2025-04-16 | End: 2025-04-17

## 2025-04-16 RX ORDER — PIPERACILLIN-TAZO-DEXTROSE,ISO 2.25G/50ML
3.38 IV SOLUTION, PIGGYBACK PREMIX FROZEN(ML) INTRAVENOUS ONCE
Refills: 0 | Status: COMPLETED | OUTPATIENT
Start: 2025-04-16 | End: 2025-04-16

## 2025-04-16 RX ORDER — VANCOMYCIN HCL IN 5 % DEXTROSE 1.5G/250ML
1000 PLASTIC BAG, INJECTION (ML) INTRAVENOUS ONCE
Refills: 0 | Status: COMPLETED | OUTPATIENT
Start: 2025-04-16 | End: 2025-04-16

## 2025-04-16 RX ORDER — ACETAMINOPHEN 500 MG/5ML
1000 LIQUID (ML) ORAL ONCE
Refills: 0 | Status: COMPLETED | OUTPATIENT
Start: 2025-04-16 | End: 2025-04-16

## 2025-04-16 RX ORDER — FLECAINIDE ACETATE 50 MG
50 TABLET ORAL EVERY 12 HOURS
Refills: 0 | Status: DISCONTINUED | OUTPATIENT
Start: 2025-04-16 | End: 2025-04-22

## 2025-04-16 RX ORDER — ACETAMINOPHEN 500 MG/5ML
650 LIQUID (ML) ORAL EVERY 6 HOURS
Refills: 0 | Status: DISCONTINUED | OUTPATIENT
Start: 2025-04-16 | End: 2025-04-22

## 2025-04-16 RX ADMIN — Medication 250 MILLIGRAM(S): at 05:47

## 2025-04-16 RX ADMIN — Medication 500 MILLILITER(S): at 06:41

## 2025-04-16 RX ADMIN — Medication 400 MILLIGRAM(S): at 04:47

## 2025-04-16 RX ADMIN — Medication 50 MILLIGRAM(S): at 18:02

## 2025-04-16 RX ADMIN — Medication 500 MILLILITER(S): at 05:47

## 2025-04-16 RX ADMIN — Medication 200 GRAM(S): at 05:20

## 2025-04-16 RX ADMIN — Medication 500 MILLILITER(S): at 04:47

## 2025-04-16 RX ADMIN — DILTIAZEM HYDROCHLORIDE 5 MILLIGRAM(S): 120 CAPSULE, EXTENDED RELEASE ORAL at 05:35

## 2025-04-16 RX ADMIN — HEPARIN SODIUM 5000 UNIT(S): 1000 INJECTION INTRAVENOUS; SUBCUTANEOUS at 21:10

## 2025-04-16 RX ADMIN — GABAPENTIN 200 MILLIGRAM(S): 400 CAPSULE ORAL at 21:09

## 2025-04-16 RX ADMIN — HEPARIN SODIUM 5000 UNIT(S): 1000 INJECTION INTRAVENOUS; SUBCUTANEOUS at 13:55

## 2025-04-16 RX ADMIN — METOPROLOL SUCCINATE 50 MILLIGRAM(S): 50 TABLET, EXTENDED RELEASE ORAL at 13:55

## 2025-04-16 RX ADMIN — DILTIAZEM HYDROCHLORIDE 5 MILLIGRAM(S): 120 CAPSULE, EXTENDED RELEASE ORAL at 05:38

## 2025-04-16 NOTE — ED PROVIDER NOTE - CARE PLAN
Principal Discharge DX:	Heart palpitations   1 Principal Discharge DX:	Mixed connective tissue disease  Secondary Diagnosis:	Pericarditis  Secondary Diagnosis:	Paroxysmal SVT (supraventricular tachycardia)  Secondary Diagnosis:	Elevated liver enzymes

## 2025-04-16 NOTE — CONSULT NOTE ADULT - SUBJECTIVE AND OBJECTIVE BOX
CHIEF COMPLAINT:    HISTORY OF PRESENT ILLNESS:      Allergies    NSAIDs (Hives)    Intolerances    	    MEDICATIONS:  heparin   Injectable 5000 Unit(s) SubCutaneous every 8 hours        acetaminophen     Tablet .. 650 milliGRAM(s) Oral every 6 hours PRN  gabapentin 200 milliGRAM(s) Oral at bedtime            PAST MEDICAL & SURGICAL HISTORY:  HTN (hypertension)      GERD (gastroesophageal reflux disease)      S/P BRENT (total abdominal hysterectomy)      S/P breast biopsy  ?cyst removal          FAMILY HISTORY:      SOCIAL HISTORY:    [ ] Non-smoker  [ ] Smoker  [ ] Alcohol      REVIEW OF SYSTEMS:  See HPI. Otherwise, 12 point ROS done and otherwise negative.    PHYSICAL EXAM:  T(C): 36.6 (04-16-25 @ 12:35), Max: 38.3 (04-16-25 @ 04:23)  HR: 98 (04-16-25 @ 12:35) (85 - 177)  BP: 104/58 (04-16-25 @ 12:35) (104/58 - 158/86)  RR: 19 (04-16-25 @ 12:35) (18 - 23)  SpO2: 96% (04-16-25 @ 12:35) (95% - 97%)  Wt(kg): --  I&O's Summary      Appearance: Normal	  HEENT: PERRL, EOMI	  Cardiovascular: Normal S1 S2, No JVD, No murmurs, No edema  Respiratory: Lungs clear to auscultation	  Psychiatry: A & O x 3, Mood & affect appropriate  Gastrointestinal:  Soft, Non-tender, + BS	  Skin: No rashes, No ecchymoses, No cyanosis	  Neurologic: Grossly intact  Extremities: No clubbing, cyanosis or edema  Vascular: Peripheral pulses palpable 2+ bilaterally        LABS:	 	    CBC Full  -  ( 16 Apr 2025 04:53 )  WBC Count : 20.43 K/uL  Hemoglobin : 7.5 g/dL  Hematocrit : 24.3 %  Platelet Count - Automated : 341 K/uL  Mean Cell Volume : 72.1 fl  Mean Cell Hemoglobin : 22.3 pg  Mean Cell Hemoglobin Concentration : 30.9 g/dL  Auto Neutrophil # : x  Auto Lymphocyte # : x  Auto Monocyte # : x  Auto Eosinophil # : x  Auto Basophil # : x  Auto Neutrophil % : x  Auto Lymphocyte % : x  Auto Monocyte % : x  Auto Eosinophil % : x  Auto Basophil % : x    04-16    131[L]  |  95[L]  |  10  ----------------------------<  110[H]  4.5   |  21[L]  |  0.60    Ca    8.7      16 Apr 2025 04:53  Mg     1.9     04-16    TPro  7.3  /  Alb  2.6[L]  /  TBili  0.4  /  DBili  x   /  AST  41[H]  /  ALT  28  /  AlkPhos  84  04-16      proBNP:   Lipid Profile:   HgA1c:   TSH: Thyroid Stimulating Hormone, Serum: 2.15 uIU/mL (04-16 @ 05:04)        CARDIAC MARKERS:                TELEMETRY: 	    ECG:  	  RADIOLOGY:  OTHER: 	    PREVIOUS DIAGNOSTIC TESTING:    [ ] Echocardiogram:  [ ]  Catheterization:  [ ] Stress Test:  	  	  ASSESSMENT/PLAN: 	     CHIEF COMPLAINT:    HISTORY OF PRESENT ILLNESS:  64 year old female with PMH of HTN, iron deficiency anemia, recent admission for odynophagia/dysphagia (s/p negative EGD), elevated inflammatory markers including elevated NORMA 1:1280 of unknown etiology, presents for evaluation of palpitations and chest pain, found to have paroxysmal SVT with -180's. Also was febrile 101F. Reports that started 2 hours prior to arrival. Was in bed trying to go to sleep when it began. Comes in episodes that last for seconds to minutes but are occurring more frequently, bringing her to the ED today for evaluation. Did not take any medications prior to arrival. Denies shortness of breath, nausea, vomiting, abdominal pain. Only takes gabapentin, no other medications. Patient reports intermittent palpitations started beginning this year.      Allergies    NSAIDs (Hives)    Intolerances    	    MEDICATIONS:  heparin   Injectable 5000 Unit(s) SubCutaneous every 8 hours  acetaminophen     Tablet .. 650 milliGRAM(s) Oral every 6 hours PRN  gabapentin 200 milliGRAM(s) Oral at bedtime    PAST MEDICAL & SURGICAL HISTORY:  HTN (hypertension)  GERD (gastroesophageal reflux disease)  S/P BRENT (total abdominal hysterectomy)  S/P breast biopsy  ?cyst removal    SOCIAL HISTORY: Lives with her sister. Denies ETOH. Denies smoking          REVIEW OF SYSTEMS:  See HPI. Otherwise, 12 point ROS done and otherwise negative.    PHYSICAL EXAM:  T(C): 36.6 (25 @ 12:35), Max: 38.3 (25 @ 04:23)  HR: 98 (25 @ 12:35) (85 - 177)  BP: 104/58 (25 @ 12:35) (104/58 - 158/86)  RR: 19 (25 @ 12:35) (18 - 23)  SpO2: 96% (25 @ 12:35) (95% - 97%)  Wt(kg): --  I&O's Summary      Appearance: Normal	  HEENT: PERRL, EOMI	  Cardiovascular: Normal S1 S2, RRR, No JVD, No murmurs  Respiratory: Lungs clear to auscultation	  Psychiatry: A & O x 3, Mood & affect appropriate  Gastrointestinal: Soft, Non-tender, + BS	  Skin: No rashes, No ecchymoses, No cyanosis	  Neurologic: Grossly intact  Extremities: No clubbing, cyanosis or edema  Vascular: Peripheral pulses palpable 2+ bilaterally        LABS:	 	    CBC Full  -  ( 2025 04:53 )  WBC Count : 20.43 K/uL  Hemoglobin : 7.5 g/dL  Hematocrit : 24.3 %  Platelet Count - Automated : 341 K/uL  Mean Cell Volume : 72.1 fl  Mean Cell Hemoglobin : 22.3 pg  Mean Cell Hemoglobin Concentration : 30.9 g/dL  Auto Neutrophil # : x  Auto Lymphocyte # : x  Auto Monocyte # : x  Auto Eosinophil # : x  Auto Basophil # : x  Auto Neutrophil % : x  Auto Lymphocyte % : x  Auto Monocyte % : x  Auto Eosinophil % : x  Auto Basophil % : x        131[L]  |  95[L]  |  10  ----------------------------<  110[H]  4.5   |  21[L]  |  0.60    Ca    8.7      2025 04:53  Mg     1.9         TPro  7.3  /  Alb  2.6[L]  /  TBili  0.4  /  DBili  x   /  AST  41[H]  /  ALT  28  /  AlkPhos  84      TSH: Thyroid Stimulating Hormone, Serum: 2.15 uIU/mL ( @ 05:04)      TELEMETRY: SR/ST at 80-107bpm   	    EC25 @ 0716: NSR at 100bpm  25 @ 0518: Long RP tachycardia at 181bpm   25 @ 0516: Sinus rhythm with atrial bigeminy   25 @ 0514: Long RP tachycardia at 172bpm  25 @ 0454: Sinus tachycardia at 119bpm    	  < from: TTE W or WO Ultrasound Enhancing Agent (25 @ 09:38) >  CONCLUSIONS:     1. Left ventricular cavity is small. Left ventricular wall thickness is normal. Left ventricular systolic function is hyperdynamic with an ejection fraction visually estimated at >75 %. There are no regional wall motion abnormalities seen.   2. Normal left ventricular diastolic function, with normal left ventricular filling pressure.   3. Normal right ventricular cavity size, with normal wall thickness, and normal right ventricular systolic function.   4. Normal left and right atrial size.   5. No significant valvular disease.   6. Small pericardial effusion noted adjacent to the right ventricle with no echocardiographic evidence of tamponade physiology.    ________________________________________________________________________________________  FINDINGS:     Left Ventricle:  The left ventricular cavity is small. Left ventricular wall thickness is normal. Left ventricular systolic function is hyperdynamic with an ejection fraction visually estimated at >75%. There are no regional wall motion abnormalities seen. There is normal left ventricular diastolic function, with normal left ventricular filling pressure.     Right Ventricle:  The right ventricular cavity is normal in size, with normal wall thickness and right ventricular systolic function is normal. Tricuspid annular plane systolic excursion (TAPSE) is 2.2 cm (normal >=1.7 cm).     Left Atrium:  The left atrium is normal in size with an indexed volume of 20.98 ml/m².     Right Atrium:  The right atrium is normal in size with an indexed volume of 6.95 ml/m².     Interatrial Septum:  The interatrial septum appears intact.     Aortic Valve:  The aortic valve appears trileaflet with normal systolic excursion. There is no aortic valve stenosis. There is trace aortic regurgitation.     Mitral Valve:  Structurally normal mitral valve with normal leaflet excursion. There is mild calcification of the mitral valve annulus. There is no mitral valve stenosis. There is no evidence of mitral regurgitation.     Tricuspid Valve:  Structurally normal tricuspid valve with normal leaflet excursion. There is trace tricuspid regurgitation. Estimated pulmonary artery systolic pressure is 27 mmHg.     Pulmonic Valve:  Structurally normal pulmonic valve with normal leaflet excursion. There is no evidence of pulmonic regurgitation.     Aorta:  The aortic root appears normal in size. The aortic root at the sinuses of Valsalva is normal in size, measuring 2.80 cm (indexed 1.85 cm/m²). The ascending aorta is normal in size, measuring 3.20 cm (indexed 2.12 cm/m²).     Pericardium:  There is a small pericardial effusion noted adjacent to the right ventricle with no echocardiographic evidence of tamponade physiology.     Systemic Veins:  The inferior vena cava is normal in size (normal <2.1cm) with normal inspiratory collapse (normal >50%) consistent with normal right atrial pressure (~3, range 0-5mmHg).  ____________________________________________________________________  QUANTITATIVE DATA:  Left Ventricle Measurements: (Indexed to BSA)     IVSd (2D):   1.1 cm  LVPWd (2D):  0.8 cm  LVIDd (2D):  3.7 cm  LVIDs (2D):  2.4 cm  LV Mass:     105 g  69.7 g/m²  Visualized LV EF%: >75%     MV E Vmax:    0.58 m/s  MV A Vmax:    0.56 m/s  MV E/A:       1.04  e' lateral:   8.92 cm/s  e' medial:    5.98 cm/s  E/e' lateral: 6.49  E/e' medial:  9.68  E/e' Average: 7.77    Aorta Measurements: (Normal range) (Indexed to BSA)     Ao Root d     2.80 cm (2.7 - 3.3 cm) 1.85 cm/m²  Ao Asc d, 2D: 3.20  Ao Asc prox:  3.20 cm                2.12 cm/m²            Left Atrium Measurements: (Indexed to BSA)  LA Diam 2D:        2.80 cm  LA Vol s, MOD A4C: 26.30 ml.  LA Vol s, MOD A2C: 38.10 ml.  LA Vol s, MOD BP:  31.70 ml  20.98 ml/m²         Right Ventricle Measurements: Right Atrial Measurements:     TAPSE: 2.2 cm                 RA Vol:            10.50 ml                                RA Vol s, MOD A4C  15.9 ml                                RA Vol Index:      6.95 ml/m²                                RA Area s, MOD A4C 9.5 cm²       LVOT / RVOT/ Qp/Qs Data: (Indexed to BSA)  LVOT Diameter,s: 1.70 cm  LVOT Area:       2.27 cm²    Mitral Valve Measurements:     MV E Vmax: 0.6 m/s  MV A Vmax: 0.6 m/s  MV E/A:    1.0       Tricuspid Valve Measurements:     TR Vmax:          2.4 m/s  TR Peak Gradient: 23.6 mmHg  RA Pressure:      3 mmHg  PASP:             27 mmHg    < end of copied text >

## 2025-04-16 NOTE — CONSULT NOTE ADULT - SUBJECTIVE AND OBJECTIVE BOX
DATE OF SERVICE: 04-16-25 @ 10:37    CHIEF COMPLAINT:Patient is a 64y old  Female who presents with a chief complaint of     HISTORY OF PRESENT ILLNESS:HPI:      PAST MEDICAL & SURGICAL HISTORY:  HTN (hypertension)      GERD (gastroesophageal reflux disease)      S/P BRENT (total abdominal hysterectomy)      S/P breast biopsy  ?cyst removal              MEDICATIONS:                  FAMILY HISTORY:      Non-contributory    SOCIAL HISTORY:    [ ] Tobacco  [ ] Drugs  [ ] Alcohol    Allergies    NSAIDs (Hives)    Intolerances    	    REVIEW OF SYSTEMS:  CONSTITUTIONAL: No fever  EYES: No eye pain, visual disturbances, or discharge  ENMT:  No difficulty hearing, tinnitus  NECK: No pain or stiffness  RESPIRATORY: No cough, wheezing,  CARDIOVASCULAR: No chest pain, palpitations, passing out, dizziness, or leg swelling  GASTROINTESTINAL:  No nausea, vomiting, diarrhea or constipation. No melena.  GENITOURINARY: No dysuria, hematuria  NEUROLOGICAL: No stroke like symptoms  SKIN: No burning or lesions   ENDOCRINE: No heat or cold intolerance  MUSCULOSKELETAL: No joint pain or swelling  PSYCHIATRIC: No  anxiety, mood swings  HEME/LYMPH: No bleeding gums  ALLERGY AND IMMUNOLOGIC: No hives or eczema	    All other ROS negative    PHYSICAL EXAM:  T(C): 36.7 (04-16-25 @ 07:20), Max: 38.3 (04-16-25 @ 04:23)  HR: 93 (04-16-25 @ 08:43) (93 - 177)  BP: 126/72 (04-16-25 @ 07:45) (115/67 - 158/86)  RR: 19 (04-16-25 @ 07:45) (18 - 23)  SpO2: 97% (04-16-25 @ 07:45) (95% - 97%)  Wt(kg): --  I&O's Summary      Appearance: Normal	  HEENT:   Normal oral mucosa, EOMI	  Cardiovascular:  S1 S2, No JVD,    Respiratory: Lungs clear to auscultation	  Psychiatry: Alert  Gastrointestinal:  Soft, Non-tender, + BS	  Skin: No rashes   Neurologic: Non-focal  Extremities:  No edema  Vascular: Peripheral pulses palpable    	    	  	  CARDIAC MARKERS:  Labs personally reviewed by me                                  7.5    20.43 )-----------( 341      ( 16 Apr 2025 04:53 )             24.3     04-16    131[L]  |  95[L]  |  10  ----------------------------<  110[H]  4.5   |  21[L]  |  0.60    Ca    8.7      16 Apr 2025 04:53  Mg     1.9     04-16    TPro  7.3  /  Alb  2.6[L]  /  TBili  0.4  /  DBili  x   /  AST  41[H]  /  ALT  28  /  AlkPhos  84  04-16          EKG: Personally reviewed by me -   Radiology: Personally reviewed by me -       Assessment /Plan:     1. Tachyarrhythmia - with SVT alternating with Afl  - CHADSVASc of 1 for female (states not on any BP meds x 1month as BP low)   - Hold off AC for now  - EP consulted for rythym control strategy eval   - Treatment of infectious process as per primary team    Full consult to follow             Differential diagnosis and plan of care discussed with patient after the evaluation. Counseling on diet, nutritional counseling, weight management, exercise and medication compliance was done.   Advanced care planning/advanced directives discussed with patient/family. DNR status including forceful chest compressions to attempt to restart the heart, ventilator support/artificial breathing, electric shock, artificial nutrition, health care proxy, Molst form all discussed with pt. Pt wishes to consider. Sixteen minutes spent on discussing advanced directives.           Christopher Gaffney DO [facility]  Cardiovascular Medicine  [address] [place] [address] Dr, [address]  Office [phone]  Available via call/text on [employer] Teams DATE OF SERVICE: 04-16-25 @ 10:37    CHIEF COMPLAINT:Patient is a 64y old  Female who presents with a chief complaint of     HISTORY OF PRESENT ILLNESS:HPI:      PAST MEDICAL & SURGICAL HISTORY:  HTN (hypertension)      GERD (gastroesophageal reflux disease)      S/P BRENT (total abdominal hysterectomy)      S/P breast biopsy  ?cyst removal              MEDICATIONS:                  FAMILY HISTORY:      Non-contributory    SOCIAL HISTORY:    [ ] Tobacco  [ ] Drugs  [ ] Alcohol    Allergies    NSAIDs (Hives)    Intolerances    	    REVIEW OF SYSTEMS:  CONSTITUTIONAL: No fever  EYES: No eye pain, visual disturbances, or discharge  ENMT:  No difficulty hearing, tinnitus  NECK: No pain or stiffness  RESPIRATORY: No cough, wheezing,  CARDIOVASCULAR: No chest pain, palpitations, passing out, dizziness, or leg swelling  GASTROINTESTINAL:  No nausea, vomiting, diarrhea or constipation. No melena.  GENITOURINARY: No dysuria, hematuria  NEUROLOGICAL: No stroke like symptoms  SKIN: No burning or lesions   ENDOCRINE: No heat or cold intolerance  MUSCULOSKELETAL: No joint pain or swelling  PSYCHIATRIC: No  anxiety, mood swings  HEME/LYMPH: No bleeding gums  ALLERGY AND IMMUNOLOGIC: No hives or eczema	    All other ROS negative    PHYSICAL EXAM:  T(C): 36.7 (04-16-25 @ 07:20), Max: 38.3 (04-16-25 @ 04:23)  HR: 93 (04-16-25 @ 08:43) (93 - 177)  BP: 126/72 (04-16-25 @ 07:45) (115/67 - 158/86)  RR: 19 (04-16-25 @ 07:45) (18 - 23)  SpO2: 97% (04-16-25 @ 07:45) (95% - 97%)  Wt(kg): --  I&O's Summary      Appearance: Normal	  HEENT:   Normal oral mucosa, EOMI	  Cardiovascular:  S1 S2, No JVD,    Respiratory: Lungs clear to auscultation	  Psychiatry: Alert  Gastrointestinal:  Soft, Non-tender, + BS	  Skin: No rashes   Neurologic: Non-focal  Extremities:  No edema  Vascular: Peripheral pulses palpable    	    	  	  CARDIAC MARKERS:  Labs personally reviewed by me                                  7.5    20.43 )-----------( 341      ( 16 Apr 2025 04:53 )             24.3     04-16    131[L]  |  95[L]  |  10  ----------------------------<  110[H]  4.5   |  21[L]  |  0.60    Ca    8.7      16 Apr 2025 04:53  Mg     1.9     04-16    TPro  7.3  /  Alb  2.6[L]  /  TBili  0.4  /  DBili  x   /  AST  41[H]  /  ALT  28  /  AlkPhos  84  04-16          EKG: Personally reviewed by me -   Radiology: Personally reviewed by me -       Assessment /Plan:     1. Tachyarrhythmia - with SVT alternating with Afl  - CHADSVASc of 1 for female (states not on any BP meds x 1month as BP low)   - Hold off AC for now  - EP consulted for rythym control strategy eval   - Treatment of infectious process as per primary team    Full recs to follow             Differential diagnosis and plan of care discussed with patient after the evaluation. Counseling on diet, nutritional counseling, weight management, exercise and medication compliance was done.   Advanced care planning/advanced directives discussed with patient/family. DNR status including forceful chest compressions to attempt to restart the heart, ventilator support/artificial breathing, electric shock, artificial nutrition, health care proxy, Molst form all discussed with pt. Pt wishes to consider. Sixteen minutes spent on discussing advanced directives.           Christopher Gaffney DO [facility]  Cardiovascular Medicine  [address] [place] [address], [address]  Office [phone]  Available via call/text on [employer] Teams DATE OF SERVICE: 04-16-25 @ 10:37    CHIEF COMPLAINT:Patient is a 64y old  Female who presents with a chief complaint of Palpitations    HISTORY OF PRESENT ILLNESS:HPI: 64F estate planning  hx HTN, MAURICIO previously on IV iron, recent admission for odynophagia/dysphagia, elevated inflammatory markers presents with palpitations and chest pain. Reports symptoms started last night at around 12:30 am, intermittent, pt was in bed trying to sleep but awoke from her symptoms which prompted her to come to the ED. Reports associated dyspnea. Also reports odynophagia/dysphagia, worked as an outpt/prior admission,  had recent EGD and was scheduled to have an outpt manommetry and gastric emptying study. Denies sob, abdominal pain, n/v, diarrhea, cough, chills, no recent travel hx or sick contracts. ,        PAST MEDICAL & SURGICAL HISTORY:  HTN (hypertension)      GERD (gastroesophageal reflux disease)      S/P BRENT (total abdominal hysterectomy)      S/P breast biopsy  ?cyst removal          MEDICATIONS:            FAMILY HISTORY:      Non-contributory    SOCIAL HISTORY:    [ ] not a current smoker    Allergies    NSAIDs (Hives)    Intolerances    	    REVIEW OF SYSTEMS:  CONSTITUTIONAL: No fever  EYES: No eye pain, visual disturbances, or discharge  ENMT:  No difficulty hearing, tinnitus  NECK: No pain or stiffness  RESPIRATORY: No cough, wheezing,  CARDIOVASCULAR: No chest pain, +palpitations,  no passing out, dizziness, or leg swelling  GASTROINTESTINAL:  No nausea, vomiting, diarrhea or constipation. No melena.  GENITOURINARY: No dysuria, hematuria  NEUROLOGICAL: No stroke like symptoms  SKIN: No burning or lesions   ENDOCRINE: No heat or cold intolerance  MUSCULOSKELETAL: No joint pain or swelling  PSYCHIATRIC: No  anxiety, mood swings  HEME/LYMPH: No bleeding gums  ALLERGY AND IMMUNOLOGIC: No hives or eczema	    All other ROS negative    PHYSICAL EXAM:  T(C): 36.7 (04-16-25 @ 07:20), Max: 38.3 (04-16-25 @ 04:23)  HR: 93 (04-16-25 @ 08:43) (93 - 177)  BP: 126/72 (04-16-25 @ 07:45) (115/67 - 158/86)  RR: 19 (04-16-25 @ 07:45) (18 - 23)  SpO2: 97% (04-16-25 @ 07:45) (95% - 97%)  Wt(kg): --  I&O's Summary      Appearance: Normal	  HEENT:   Normal oral mucosa, EOMI	  Cardiovascular:  S1 S2, No JVD,    Respiratory: Lungs clear to auscultation	  Psychiatry: Alert  Gastrointestinal:  Soft, Non-tender, + BS	  Skin: No rashes   Neurologic: Non-focal  Extremities:  No edema  Vascular: Peripheral pulses palpable    	    	  	  CARDIAC MARKERS:  Labs personally reviewed by me                                  7.5    20.43 )-----------( 341      ( 16 Apr 2025 04:53 )             24.3     04-16    131[L]  |  95[L]  |  10  ----------------------------<  110[H]  4.5   |  21[L]  |  0.60    Ca    8.7      16 Apr 2025 04:53  Mg     1.9     04-16    TPro  7.3  /  Alb  2.6[L]  /  TBili  0.4  /  DBili  x   /  AST  41[H]  /  ALT  28  /  AlkPhos  84  04-16          EKG: Personally reviewed by me - SVT  Radiology: Personally reviewed by me - CTA chest IMPRESSION:    No pulmonary embolism within the confines of this exam.    Small bilateral pleural effusions and bibasilar atelectasis.    Cardiomegaly. Moderate sized pericardial effusion compared with prior   exam from 3/7/2025.         ASSESSMENT/PLAN: 	    64F with hx HTN, MAURICIO previously on IV iron, recent admission for odynophagia/dysphagia, elevated inflammatory markers presents with palpitations and chest pain.    1. Tachyarrhythmia - pSVT  - EP consulted for rythym control strategy eval   - Likely precipitated by infectious process   - Continue tele monitoring     2. Chest pain  - Recent NST (4/14/25) normal myocardial perfusion  - TTE 4/17/25 - preserved EF, small to mod pericardial effusion, with no evidence of tamponade, thickened pericardium, b/l pleural effusion (compared to small effusion 3/11/25)  - ProBNP elevated 2944 (compared to 318 last month)   - ? viral pericarditis     3. Systemic inflammatory response syndrome (SIRS).   ·  Plan: Pt with leukocytosis and febrile episode 101 in ED  - Viral panel negative, UA negative for UTI, blood cx sent  - CT chest negative for pna   - LE dopplers n   - ID consult  - Rheumatology consulted          Differential diagnosis and plan of care discussed with patient after the evaluation. Counseling on diet, nutritional counseling, weight management, exercise and medication compliance was done.   Advanced care planning/advanced directives discussed with patient/family. DNR status including forceful chest compressions to attempt to restart the heart, ventilator support/artificial breathing, electric shock, artificial nutrition, health care proxy, Molst form all discussed with pt. Pt wishes to consider. Sixteen minutes spent on discussing advanced directives.           Christopher Gaffney DO [facility]  Cardiovascular Medicine  [address] [place] [address], [address]  Office [phone]  Available via call/text on [employer] Teams

## 2025-04-16 NOTE — H&P ADULT - ASSESSMENT
64F estate planning  hx HTN, MAURICIO previously on IV iron, recent admission for odynophagia/dysphagia, elevated inflammatory markers presents with palpitations and chest pain.

## 2025-04-16 NOTE — CHART NOTE - NSCHARTNOTEFT_GEN_A_CORE
65 yo F with PMH of HTN, MAURICIO, and recent pneumonia presenting for odynophagia, dysphagia, dyspnea, and weakness for 2 months, found to have elevated CRP, microcytic anemia, and positive NORMA c/f systemic process, now presented w/ chest pain and elevated troponins.   Per chart review, patient follows w/ Dr. Freitas.     P/w systemic symptoms for 2-3 months with dyspnea, tachycardia, myalgias and weakness. She endorses dysphagia to solid>liquids and odynophagia with any swallowing even saliva for the same time period. Workup at OSH notable for NORMA 1:1280. Barium esophagram at [facility] on 3/5 showed a few tertiary contractions commiserate with age and no stricture or mucosal abnormality. 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 She had EGD on 3/25/2025 which was normal pathology and neg for EoE. She was referred for manometry and possibly NM gastric emptying study as well. Differentials is broad including, scleroderma, Sjogren's syndrome, systemic lupus erythematosus, rheumatoid arthritis, sarcoidosis, Behcet's disease, anti-neutrophil cytoplasmic antibody (ANCA)-associated vasculitis, or granulomatosis with polyangiitis, have been associated with dysphagia. Dermatomyositis and polymyositis also on ddx. Patient also has MAURICIO, Recent workup at OSH shows iron studies 2/9/2025 show iron 12, tibc 213, %sat 6, ferritin 252 c/w mixed MAURICIO and AOCD. Hgb is around 8-7. Her last colonoscopy was 2011 which showed gastritis and hemorrhoids but she has not had repeat evaluation since she was found to have MAURICIO. Can consider o/p colonoscopy with Dr. Freitas.     Discussed with the primary team.     GI will sign off. Thank you for the consult. Please call us back if you have any questions.       Tera Vitale, PGY-6  Gastroenterology/Hepatology Fellow  Available on Microsoft Teams  [phone] (Short Range Pager)  [phone] (Long Range Pager)    After 5pm, please contact the on-call GI fellow.

## 2025-04-16 NOTE — ED PROVIDER NOTE - ATTENDING CONTRIBUTION TO CARE
Pancho Green MD (Attending Physician):    I performed a history and physical exam of the patient and discussed their management with the resident/fellow/ACP/student. I have reviewed the resident/fellow/ACP/student note and agree with the documented findings and plan of care, except as noted. I have personally performed a substantive portion of the visit including all aspects of the medical decision making. My medical decision making and observations are found below. Please refer to any progress notes for updates on clinical course.    HPI:  64yoF with pmhx of HTN, iron deficiency anemia, elevated NORMA 1:1280 of unknown etiology, presents for evaluation of palpitations and chest pain that started 2 hours prior to arrival. Was in bed trying to go to sleep when it began. Comes in episodes that last for seconds to minutes, but are occurring more frequently. Did not take any medications prior to arrival. Denies shortness of breath, nausea, vomiting, abdominal pain. Only takes gabapentin, no other medications.    PE:  Vitals noted  GEN - +In mild discomfort, A&Ox3, +febrile  HEAD - NC/AT  EYES - PERRL, EOMI  ENT - Airway patent, +mucous membranes dry  NECK - Supple, non-tender without lymphadenopathy, no masses  PULMONARY - Normal wob, CTA b/l, symmetric breath sounds, no W/R/R, satting 98% on RA  CARDIAC - +S1S2, +NSR but tachy to 110s, no M/G/R, no JVD  ABDOMEN - +BS, ND, NT, soft, no guarding, no rebound, no masses, no rigidity   - No CVA TTP b/l  EXTREMITIES - FROM, symmetric pulses. 1+ pitting pedal edema b/l. B/l calves NT.  SKIN - No rash or bruising  NEUROLOGIC - Alert, speech clear, moving all extremities spontaneously, no focal deficits  PSYCH - Normal mood/affect, normal insight    MDM:  DDx includes, but not limited to: sepsis 2/2 to PNA, viral syndrome, UTI; ACS, arrythmia, DVT/PE, PTX, pancreatitis, gastritis, anemia, metabolic derangement. ekg, cxr, b/l LE duplex venous US, CTA chest, labs, tylenol, IVF, empiric abx. Repeat ekg shows possible SVT, will get cardiology consult and give IV diltiazem. Will require admission.

## 2025-04-16 NOTE — ED PROVIDER NOTE - CLINICAL SUMMARY MEDICAL DECISION MAKING FREE TEXT BOX
64yoF hx of HTN, iron deficiency anemia, elevated NORMA 1:1280 of unknown etiology, presents for evaluation of palpitations and chest pain that started 2 hours prior to arrival. Was in bed trying to go to sleep when it began. Comes in episodes that last for seconds to minutes but are occurring more frequently, bringing her to the ED today for evaluation. Did not take any medications prior to arrival. Denies shortness of breath, nausea, vomiting, abdominal pain. Only takes gabapentin, no other medications.    Alternating between tachycardia and normal heart rate, febrile, other vitals nonactionable    Nontoxic appearing, follows commands, lungs clear, abdomen soft, nontender, +pedal edema 64yoF hx of HTN, iron deficiency anemia, elevated NORMA 1:1280 of unknown etiology, presents for evaluation of palpitations and chest pain that started 2 hours prior to arrival. Was in bed trying to go to sleep when it began. Comes in episodes that last for seconds to minutes but are occurring more frequently, bringing her to the ED today for evaluation. Did not take any medications prior to arrival. Denies shortness of breath, nausea, vomiting, abdominal pain. Only takes gabapentin, no other medications.    Alternating between tachycardia and normal heart rate, febrile, other vitals nonactionable    Nontoxic appearing, follows commands, lungs clear, abdomen soft, nontender, +pedal edema    Older female with rheumatologic history, not on medications, presents for evaluation of palpitations, tachycardic and febrile on arrival. High suspicion for sepsis as etiology but also considering cardiogenic causes for arrythmia vs ACS vs pna, will obtain screening labs, imaging, empiric abx, EKG, likely TBA

## 2025-04-16 NOTE — ED ADULT NURSE REASSESSMENT NOTE - NS ED NURSE REASSESS COMMENT FT1
Patient became tachycardic to 190s on tele monitor, ED RN and ED MD at bedside. Patient complaining of "heart fluttering" with intermittent chest pain.   Patient placed on pads and continuous EKG. Additional IV placed. Vitals as documented in flow sheet.

## 2025-04-16 NOTE — H&P ADULT - PROBLEM SELECTOR PLAN 3
Pt with chest pain/dyspnea   Trend troponins   Continue with tele monitoring   CTA negative for PE  LE dopplers negative for DVT   Check stress test   Cards follow up Pt with chest pain/dyspnea   Trend troponins   Continue with tele monitoring   CTA negative for PE  LE dopplers negative for DVT   Pericardial effusion on echo   Check stress test   Cards follow up

## 2025-04-16 NOTE — CONSULT NOTE ADULT - SUBJECTIVE AND OBJECTIVE BOX
Patient is a 64y old  Female who presents with a chief complaint of SVT, Afl (16 Apr 2025 10:35)    HPI:      PAST MEDICAL & SURGICAL HISTORY:  HTN (hypertension)      GERD (gastroesophageal reflux disease)      S/P BRENT (total abdominal hysterectomy)      S/P breast biopsy  ?cyst removal          Social history:    FAMILY HISTORY:         Allergic/Immunologic:	No hives or rash   Allergies    NSAIDs (Hives)    Intolerances        Antimicrobials:          Vital Signs Last 24 Hrs  T(C): 36.7 (16 Apr 2025 07:20), Max: 38.3 (16 Apr 2025 04:23)  T(F): 98.1 (16 Apr 2025 07:20), Max: 101 (16 Apr 2025 04:23)  HR: 85 (16 Apr 2025 11:12) (85 - 177)  BP: 114/60 (16 Apr 2025 11:12) (114/60 - 158/86)  BP(mean): 81 (16 Apr 2025 11:12) (81 - 93)  RR: 19 (16 Apr 2025 11:12) (18 - 23)  SpO2: 96% (16 Apr 2025 11:12) (95% - 97%)    Parameters below as of 16 Apr 2025 11:12  Patient On (Oxygen Delivery Method): room air           Eyes:PERRL EOMI.NO discharge or conjunctival injection    ENMT:No sinus tenderness.No thrush.No pharyngeal exudate or erythema.Fair dental hygiene    Neck:Supple,No LN,no JVD      Respiratory:Good air entry bilaterally,CTA    Cardiovascular:S1 S2 wnl, No murmurs,rub or gallops    Gastrointestinal:Soft BS(+) no tenderness no masses ,No rebound or guarding    Genitourinary:No CVA tendereness     Rectal:    Extremities:  edema.       Lymph Nodes:No palpable LNs                                   7.5    20.43 )-----------( 341      ( 16 Apr 2025 04:53 )             24.3         04-16    131[L]  |  95[L]  |  10  ----------------------------<  110[H]  4.5   |  21[L]  |  0.60    Ca    8.7      16 Apr 2025 04:53  Mg     1.9     04-16    TPro  7.3  /  Alb  2.6[L]  /  TBili  0.4  /  DBili  x   /  AST  41[H]  /  ALT  28  /  AlkPhos  84  04-16      RECENT CULTURES:      MICROBIOLOGY:          Radiology:      Assessment:        Recommendations and Plan:    Pager [phone]  After 5 pm/weekends or if no response :[phone]

## 2025-04-16 NOTE — ED ADULT NURSE NOTE - OBJECTIVE STATEMENT
65 y/o F A/Ox4 PMH GERD, HTN, irregular heart beat presented to the ED via walk-in triage with complaints of palpitations. 63 y/o F A/Ox4 PMH GERD, HTN, irregular heart beat presented to the ED via walk-in triage with complaints of palpitations. Patient states the palpitations started around 1am and has been consistent since. Patient states she endorses chest pain and SOB when the palpitations occur. Upon assessment patient is able to move all extremities evenly but cannot walk unless with an assist. Patient states she has been weaker for a few months. Patient denies any nausea, vomiting, diarrhea, and neuro status intact. Patient denies SOB and chest pain at this time. Safety measures in place, bed locked, lowered, and side rails raised.

## 2025-04-16 NOTE — CONSULT NOTE ADULT - ASSESSMENT
65 yo F with PMH of HTN, MAURICIO, and recent pneumonia presenting for odynophagia, dysphagia, dyspnea, and weakness for 3  months, found to have elevated CRP, microcytic anemia, and positive NORMA c/f systemic process.     Impression:  #Odynophagia, Dysphagia  #Iron Deficiency Anemia  #Myalgias dyspnea, weakness  #Positive inflammatory markers    P/w systemic symptoms for 2-3 months with dyspnea, tachycardia, myalgias and weakness. She endorses dysphagia to solid>liquids and odynophagia with any swallowing even saliva for the same time period. On admission, pt found to have low grade temp to 100.2 and mild tachycardia with wbc 14.2. Labs notable for Hgb 8 (prior baseline 10.4 1/2025), MCV 73.2, , proteinuria, and albumin 2.9.  Workup at OSH notable for NORMA 1:1280. Barium esophagram at [facility] on 3/5 showed a few tertiary contractions commiserate with age and no stricture or mucosal abnormality. 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 Rheumatological disorders, such as scleroderma, Sjogren's syndrome, systemic lupus erythematosus, rheumatoid arthritis, sarcoidosis, Behcet's disease, anti-neutrophil cytoplasmic antibody (ANCA)-associated vasculitis, or granulomatosis with polyangiitis, have been associated with dysphagia. Dermatomyositis and polymyositis also on ddx. Structural cause such as adenocarcinoma also possible though systemic cause more likely given pt's symptoms. Thyroid disorder also on ddx. 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 Her last EGD/colonoscopy was 2011 which showed gastritis and hemorrhoids but she has not had repeat evaluation since she was found to have MAURICIO.    S/P EGD 3/10, normal appearing esophagus and gastritis, biopsied.   was discharged but states she has gotten worse since March with weakness, edema, dysphagia, cp, gage.  noted to have an elevated wbc  no eosinophil  non toxic  febrile in er but not since    Dysphagia  Chest pain  with elevated trop   edema  Elevated NORMA and inflammatory markers  improvement with steroids in the past     no unifying diagnosis  ( occult malignancy, rheumatologic disease more likely than infection)   would hold antibiotics unless she develops signs of sepsis   await cultures

## 2025-04-16 NOTE — H&P ADULT - HISTORY OF PRESENT ILLNESS
64F estate planning  hx HTN, MAURICIO previously on IV iron, recent admission for odynophagia/dysphagia, elevated inflammatory markers presents with palpitations and chest pain. Reports symptoms started last night at around 12:30 am, intermittent, pt was in bed trying to sleep but awoke from her symptoms which prompted her to come to the ED. Reports associated dyspnea. Also reports odynophagia/dysphagia, worked as an outpt/prior admission,  had recent EGD and was scheduled to have an outpt manommetry and gastric emptying study. Denies sob, abdominal pain, n/v, diarrhea, cough, chills, no recent travel hx or sick contracts. ,

## 2025-04-16 NOTE — CONSULT NOTE ADULT - ASSESSMENT
64 year old female with PMH of HTN, iron deficiency anemia, recent admission for odynophagia/dysphagia (s/p negative EGD), elevated inflammatory markers including elevated NORMA 1:1280 of unknown etiology, presents for evaluation of palpitations and chest pain, found to have paroxysmal SVT with -180's and febrile (101F).     Dx: Paroxysmal SVT  -ECGs consistent with SVT with a pathway  -TSH WNL  -Currently in SR  -Recent NST (4/14/25) normal myocardial perfusion  -Start metoprolol XL 50mg QD  -Start flecainide 50mg Q12hr   -Infectious w/u per ID rec, BCx x 2 sent  -Continue telemetry monitor    LAUREN Boo, NP-C  [phone]

## 2025-04-16 NOTE — ED PROVIDER NOTE - PROGRESS NOTE DETAILS
Attending Larry: received sign out pending cards eval and admission. Fellow Young: signout pending dvt us, ua and admission. pt HDS and rates improved with sinus tachycardia/NSR. TTE bedside with small pericardial effusion and no e/o tamponade. admitted under Dr. Locke hospitalist team Attending Larry: received sign out pending cards eval and admission. Prior team had discussed w/ Dr. Norton, recommended no AC at this time for potentially a flutter seen on EKG

## 2025-04-16 NOTE — ED PROVIDER NOTE - SHIFT CHANGE DETAILS
Attending Larry: I have assumed care of this patient. I have fully participated in the care of this patient. I have made amendments to the documentation where appropriate and have supervised the ongoing plan of care enacted by the Fellow/Resident/KALYANI/Student.

## 2025-04-16 NOTE — H&P ADULT - PROBLEM SELECTOR PLAN 1
Pt with leukocytosis and febrile episode 101 in ED  Asymptomatic   Viral panel negative   UA negative for UTI  CT chest negative for pna   LE dopplers negative for DVT   Echo low suspicion for endocarditis   Check CT A/P  Follow up blood cx   Follow up ESR/CRP  Monitor off abx for now   ID eval   If leukocytosis persists/ no evidence of infection consult heme

## 2025-04-16 NOTE — H&P ADULT - PROBLEM SELECTOR PLAN 2
Pt with SVT/Aflutter in the ED   Evaluated by cards in the ED   CHADSVASc of 1   No AC  EP eval for rhythm control strategy

## 2025-04-16 NOTE — H&P ADULT - PROBLEM SELECTOR PLAN 4
Hx of odynophagia   S/p EGD 3/25  Was pending manommetry/gastric emptying study   Soft diet   GI eval for further recs

## 2025-04-17 ENCOUNTER — RESULT REVIEW (OUTPATIENT)
Age: 65
End: 2025-04-17

## 2025-04-17 DIAGNOSIS — I47.10 SUPRAVENTRICULAR TACHYCARDIA, UNSPECIFIED: ICD-10-CM

## 2025-04-17 DIAGNOSIS — D63.8 ANEMIA IN OTHER CHRONIC DISEASES CLASSIFIED ELSEWHERE: ICD-10-CM

## 2025-04-17 LAB
A1C WITH ESTIMATED AVERAGE GLUCOSE RESULT: 5.1 % — SIGNIFICANT CHANGE UP (ref 4–5.6)
ALBUMIN SERPL ELPH-MCNC: 2.2 G/DL — LOW (ref 3.3–5)
ALP SERPL-CCNC: 80 U/L — SIGNIFICANT CHANGE UP (ref 40–120)
ALT FLD-CCNC: 21 U/L — SIGNIFICANT CHANGE UP (ref 10–45)
ANION GAP SERPL CALC-SCNC: 14 MMOL/L — SIGNIFICANT CHANGE UP (ref 5–17)
AST SERPL-CCNC: 32 U/L — SIGNIFICANT CHANGE UP (ref 10–40)
BASOPHILS # BLD AUTO: 0.06 K/UL — SIGNIFICANT CHANGE UP (ref 0–0.2)
BASOPHILS NFR BLD AUTO: 0.5 % — SIGNIFICANT CHANGE UP (ref 0–2)
BILIRUB SERPL-MCNC: 0.3 MG/DL — SIGNIFICANT CHANGE UP (ref 0.2–1.2)
BUN SERPL-MCNC: 8 MG/DL — SIGNIFICANT CHANGE UP (ref 7–23)
CALCIUM SERPL-MCNC: 8.1 MG/DL — LOW (ref 8.4–10.5)
CHLORIDE SERPL-SCNC: 100 MMOL/L — SIGNIFICANT CHANGE UP (ref 96–108)
CO2 SERPL-SCNC: 19 MMOL/L — LOW (ref 22–31)
CREAT SERPL-MCNC: 0.55 MG/DL — SIGNIFICANT CHANGE UP (ref 0.5–1.3)
CRP SERPL-MCNC: 135 MG/L — HIGH (ref 0–4)
EGFR: 103 ML/MIN/1.73M2 — SIGNIFICANT CHANGE UP
EGFR: 103 ML/MIN/1.73M2 — SIGNIFICANT CHANGE UP
EOSINOPHIL # BLD AUTO: 0.02 K/UL — SIGNIFICANT CHANGE UP (ref 0–0.5)
EOSINOPHIL NFR BLD AUTO: 0.2 % — SIGNIFICANT CHANGE UP (ref 0–6)
ERYTHROCYTE [SEDIMENTATION RATE] IN BLOOD: 120 MM/HR — HIGH (ref 0–20)
ESTIMATED AVERAGE GLUCOSE: 100 MG/DL — SIGNIFICANT CHANGE UP (ref 68–114)
FOLATE SERPL-MCNC: 10.8 NG/ML — SIGNIFICANT CHANGE UP
GLUCOSE BLDC GLUCOMTR-MCNC: 76 MG/DL — SIGNIFICANT CHANGE UP (ref 70–99)
GLUCOSE SERPL-MCNC: 53 MG/DL — CRITICAL LOW (ref 70–99)
HCT VFR BLD CALC: 24.4 % — LOW (ref 34.5–45)
HGB BLD-MCNC: 7.4 G/DL — LOW (ref 11.5–15.5)
IMM GRANULOCYTES NFR BLD AUTO: 1.8 % — HIGH (ref 0–0.9)
IRON SATN MFR SERPL: 16 % — SIGNIFICANT CHANGE UP (ref 14–50)
IRON SATN MFR SERPL: 20 UG/DL — LOW (ref 30–160)
LYMPHOCYTES # BLD AUTO: 1.22 K/UL — SIGNIFICANT CHANGE UP (ref 1–3.3)
LYMPHOCYTES # BLD AUTO: 9.5 % — LOW (ref 13–44)
MAGNESIUM SERPL-MCNC: 1.9 MG/DL — SIGNIFICANT CHANGE UP (ref 1.6–2.6)
MCHC RBC-ENTMCNC: 22.1 PG — LOW (ref 27–34)
MCHC RBC-ENTMCNC: 30.3 G/DL — LOW (ref 32–36)
MCV RBC AUTO: 72.8 FL — LOW (ref 80–100)
MONOCYTES # BLD AUTO: 0.56 K/UL — SIGNIFICANT CHANGE UP (ref 0–0.9)
MONOCYTES NFR BLD AUTO: 4.4 % — SIGNIFICANT CHANGE UP (ref 2–14)
NEUTROPHILS # BLD AUTO: 10.75 K/UL — HIGH (ref 1.8–7.4)
NEUTROPHILS NFR BLD AUTO: 83.6 % — HIGH (ref 43–77)
NRBC BLD AUTO-RTO: 0 /100 WBCS — SIGNIFICANT CHANGE UP (ref 0–0)
PHOSPHATE SERPL-MCNC: 3.2 MG/DL — SIGNIFICANT CHANGE UP (ref 2.5–4.5)
PLATELET # BLD AUTO: 350 K/UL — SIGNIFICANT CHANGE UP (ref 150–400)
POTASSIUM SERPL-MCNC: 4.3 MMOL/L — SIGNIFICANT CHANGE UP (ref 3.5–5.3)
POTASSIUM SERPL-SCNC: 4.3 MMOL/L — SIGNIFICANT CHANGE UP (ref 3.5–5.3)
PROT SERPL-MCNC: 6.1 G/DL — SIGNIFICANT CHANGE UP (ref 6–8.3)
RBC # BLD: 3.35 M/UL — LOW (ref 3.8–5.2)
RBC # FLD: 21.3 % — HIGH (ref 10.3–14.5)
SODIUM SERPL-SCNC: 133 MMOL/L — LOW (ref 135–145)
TIBC SERPL-MCNC: 125 UG/DL — LOW (ref 220–430)
TSH SERPL-MCNC: 0.7 UIU/ML — SIGNIFICANT CHANGE UP (ref 0.27–4.2)
UIBC SERPL-MCNC: 105 UG/DL — LOW (ref 110–370)
VIT B12 SERPL-MCNC: 933 PG/ML — SIGNIFICANT CHANGE UP (ref 232–1245)
WBC # BLD: 12.84 K/UL — HIGH (ref 3.8–10.5)
WBC # FLD AUTO: 12.84 K/UL — HIGH (ref 3.8–10.5)

## 2025-04-17 PROCEDURE — G0545: CPT

## 2025-04-17 PROCEDURE — 99232 SBSQ HOSP IP/OBS MODERATE 35: CPT

## 2025-04-17 PROCEDURE — 93308 TTE F-UP OR LMTD: CPT | Mod: 26

## 2025-04-17 PROCEDURE — 99222 1ST HOSP IP/OBS MODERATE 55: CPT

## 2025-04-17 PROCEDURE — 99233 SBSQ HOSP IP/OBS HIGH 50: CPT

## 2025-04-17 RX ORDER — DEXTROSE 50 % IN WATER 50 %
15 SYRINGE (ML) INTRAVENOUS ONCE
Refills: 0 | Status: DISCONTINUED | OUTPATIENT
Start: 2025-04-17 | End: 2025-04-22

## 2025-04-17 RX ORDER — DEXTROSE 50 % IN WATER 50 %
25 SYRINGE (ML) INTRAVENOUS ONCE
Refills: 0 | Status: DISCONTINUED | OUTPATIENT
Start: 2025-04-17 | End: 2025-04-22

## 2025-04-17 RX ORDER — IRON SUCROSE 20 MG/ML
200 INJECTION, SOLUTION INTRAVENOUS EVERY 24 HOURS
Refills: 0 | Status: DISCONTINUED | OUTPATIENT
Start: 2025-04-17 | End: 2025-04-17

## 2025-04-17 RX ORDER — SODIUM CHLORIDE 9 G/1000ML
1000 INJECTION, SOLUTION INTRAVENOUS
Refills: 0 | Status: DISCONTINUED | OUTPATIENT
Start: 2025-04-17 | End: 2025-04-22

## 2025-04-17 RX ORDER — GLUCAGON 3 MG/1
1 POWDER NASAL ONCE
Refills: 0 | Status: DISCONTINUED | OUTPATIENT
Start: 2025-04-17 | End: 2025-04-22

## 2025-04-17 RX ORDER — DEXTROSE 50 % IN WATER 50 %
12.5 SYRINGE (ML) INTRAVENOUS ONCE
Refills: 0 | Status: DISCONTINUED | OUTPATIENT
Start: 2025-04-17 | End: 2025-04-22

## 2025-04-17 RX ORDER — GABAPENTIN 400 MG/1
300 CAPSULE ORAL AT BEDTIME
Refills: 0 | Status: DISCONTINUED | OUTPATIENT
Start: 2025-04-17 | End: 2025-04-22

## 2025-04-17 RX ORDER — COLCHICINE 0.6 MG/1
0.6 TABLET, FILM COATED ORAL
Refills: 0 | Status: DISCONTINUED | OUTPATIENT
Start: 2025-04-17 | End: 2025-04-22

## 2025-04-17 RX ORDER — INFLUENZA A VIRUS A/IDAHO/07/2018 (H1N1) ANTIGEN (MDCK CELL DERIVED, PROPIOLACTONE INACTIVATED, INFLUENZA A VIRUS A/INDIANA/08/2018 (H3N2) ANTIGEN (MDCK CELL DERIVED, PROPIOLACTONE INACTIVATED), INFLUENZA B VIRUS B/SINGAPORE/INFTT-16-0610/2016 ANTIGEN (MDCK CELL DERIVED, PROPIOLACTONE INACTIVATED), INFLUENZA B VIRUS B/IOWA/06/2017 ANTIGEN (MDCK CELL DERIVED, PROPIOLACTONE INACTIVATED) 15; 15; 15; 15 UG/.5ML; UG/.5ML; UG/.5ML; UG/.5ML
0.5 INJECTION, SUSPENSION INTRAMUSCULAR ONCE
Refills: 0 | Status: DISCONTINUED | OUTPATIENT
Start: 2025-04-17 | End: 2025-04-22

## 2025-04-17 RX ADMIN — Medication 50 MILLIGRAM(S): at 18:58

## 2025-04-17 RX ADMIN — Medication 650 MILLIGRAM(S): at 15:05

## 2025-04-17 RX ADMIN — COLCHICINE 0.6 MILLIGRAM(S): 0.6 TABLET, FILM COATED ORAL at 18:58

## 2025-04-17 RX ADMIN — HEPARIN SODIUM 5000 UNIT(S): 1000 INJECTION INTRAVENOUS; SUBCUTANEOUS at 15:04

## 2025-04-17 RX ADMIN — Medication 650 MILLIGRAM(S): at 21:30

## 2025-04-17 RX ADMIN — METOPROLOL SUCCINATE 50 MILLIGRAM(S): 50 TABLET, EXTENDED RELEASE ORAL at 05:27

## 2025-04-17 RX ADMIN — HEPARIN SODIUM 5000 UNIT(S): 1000 INJECTION INTRAVENOUS; SUBCUTANEOUS at 05:26

## 2025-04-17 RX ADMIN — Medication 650 MILLIGRAM(S): at 22:30

## 2025-04-17 RX ADMIN — GABAPENTIN 300 MILLIGRAM(S): 400 CAPSULE ORAL at 21:30

## 2025-04-17 RX ADMIN — Medication 50 MILLIGRAM(S): at 05:27

## 2025-04-17 NOTE — PHYSICAL THERAPY INITIAL EVALUATION ADULT - DISCHARGE PLANNER MADE AWARE
Telephone encounter placed to review left thumb fracture. Recommended follow up with orthopedic specialist, pt states that she has a resource for ortho MD and two orthopedic provider recommendations provided as well. Pt encouraged to use tylenol and Advil as needed and ice area, may use over the counter splinting products until seen by ortho. Pt verbalized understanding. yes

## 2025-04-17 NOTE — CONSULT NOTE ADULT - SUBJECTIVE AND OBJECTIVE BOX
Reason for consult: anemia    HPI:  64F estate planning  hx HTN, MAURICIO previously on IV iron, recent admission for odynophagia/dysphagia, elevated inflammatory markers presents with palpitations and chest pain. Reports symptoms started last night at around 12:30 am, intermittent, pt was in bed trying to sleep but awoke from her symptoms which prompted her to come to the ED. Reports associated dyspnea. Also reports odynophagia/dysphagia, worked as an outpt/prior admission,  had recent EGD and was scheduled to have an outpt manommetry and gastric emptying study. Denies sob, abdominal pain, n/v, diarrhea, cough, chills, no recent travel hx or sick contracts. ,    Hematology/Oncology called to see patient who follows with Dr. Jovana Matamoros of [facility] for management of anemia      PAST MEDICAL & SURGICAL HISTORY:  HTN (hypertension)      GERD (gastroesophageal reflux disease)      S/P BRENT (total abdominal hysterectomy)      S/P breast biopsy  ?cyst removal          FAMILY HISTORY:      Alochol: Denied  Smoking: Nonsmoker  Drug Use: Denied  Marital Status:         Allergies    NSAIDs (Hives)    Intolerances        MEDICATIONS  (STANDING):  dextrose 5%. 1000 milliLiter(s) (50 mL/Hr) IV Continuous <Continuous>  dextrose 5%. 1000 milliLiter(s) (100 mL/Hr) IV Continuous <Continuous>  dextrose 50% Injectable 25 Gram(s) IV Push once  dextrose 50% Injectable 12.5 Gram(s) IV Push once  dextrose 50% Injectable 25 Gram(s) IV Push once  dextrose Oral Gel 15 Gram(s) Oral once  flecainide 50 milliGRAM(s) Oral every 12 hours  gabapentin 300 milliGRAM(s) Oral at bedtime  glucagon  Injectable 1 milliGRAM(s) IntraMuscular once  heparin   Injectable 5000 Unit(s) SubCutaneous every 8 hours  metoprolol succinate ER 50 milliGRAM(s) Oral daily    MEDICATIONS  (PRN):  acetaminophen     Tablet .. 650 milliGRAM(s) Oral every 6 hours PRN Temp greater or equal to 38C (100.4F), Mild Pain (1 - 3)      ROS  No fever, sweats, chills  No epistaxis, HA, sore throat  No CP, SOB, cough, sputum  No n/v/d, abd pain, melena, hematochezia  No edema  No rash  No anxiety  No back pain, joint pain  No bleeding, bruising  No dysuria, hematuria    T(C): 37.2 (04-17-25 @ 12:00), Max: 37.3 (04-17-25 @ 04:32)  HR: 88 (04-17-25 @ 12:00) (83 - 91)  BP: 125/78 (04-17-25 @ 12:00) (117/77 - 143/76)  RR: 18 (04-17-25 @ 12:00) (18 - 20)  SpO2: 95% (04-17-25 @ 12:00) (91% - 96%)  Wt(kg): --    PE  NAD  Awake, alert  Anicteric, MMM  RRR  CTAB  Abd soft, NT, ND  No c/c/e  No rash grossly  FROM                          7.4    12.84 )-----------( 350      ( 17 Apr 2025 07:17 )             24.4       04-17    133[L]  |  100  |  8   ----------------------------<  53[LL]  4.3   |  19[L]  |  0.55    Ca    8.1[L]      17 Apr 2025 07:17  Phos  3.2     04-17  Mg     1.9     04-17    TPro  6.1  /  Alb  2.2[L]  /  TBili  0.3  /  DBili  x   /  AST  32  /  ALT  21  /  AlkPhos  80  04-17

## 2025-04-17 NOTE — PROGRESS NOTE ADULT - ASSESSMENT
64 year old female with PMH of HTN, iron deficiency anemia, recent admission for odynophagia/dysphagia (s/p negative EGD), elevated inflammatory markers including elevated NORMA 1:1280 of unknown etiology, presents for evaluation of palpitations and chest pain, found to have paroxysmal SVT with -180's and febrile (101F).     Dx: Paroxysmal SVT  -ECGs consistent with SVT with a pathway  -TSH WNL  -Recent NST (4/14/25) normal myocardial perfusion  -She had an episode of SOB overnight with O2 sat 91% on room air and tele did not review any arrhythmia.   -Tele: SR at 60-90's, no further SVT since started on flecainide/metoprolol   -Continue metoprolol XL 50mg QD  -Continue flecainide 50mg Q12hr   -Infectious w/u per ID rec, BCx x 2 sent on 4/17 with pending result, monitor off abx per ID recs.  -Continue telemetry monitor    LAUREN Boo NP-C  Can reach me via Teams   64 year old female with PMH of HTN, iron deficiency anemia, recent admission for odynophagia/dysphagia (s/p negative EGD), elevated inflammatory markers including elevated NORMA 1:1280 of unknown etiology, presents for evaluation of palpitations and chest pain, found to have paroxysmal SVT with -180's and febrile (101F).     Dx: Paroxysmal SVT  -ECGs consistent with SVT with a pathway  -TSH WNL  -Recent NST (4/14/25) normal myocardial perfusion  -She had an episode of SOB overnight with O2 sat 91% on room air and tele did not review any arrhythmia.   -Tele: SR at 60-90's, no further SVT since started on flecainide/metoprolol   -Continue metoprolol XL 50mg QD  -Continue flecainide 50mg Q12hr   -Infectious w/u per ID rec, BCx x 2 sent on 4/17 with pending result, monitor off abx per ID recs.  -Continue telemetry monitor  -She may benefit from EPS/ablation as outpatient     SFRAN Scott  Can reach me via Teams   64 year old female with PMH of HTN, iron deficiency anemia, recent admission for odynophagia/dysphagia (s/p negative EGD), elevated inflammatory markers including elevated NORMA 1:1280 of unknown etiology, presents for evaluation of palpitations and chest pain, found to have paroxysmal SVT with -180's and febrile (101F).     Dx: Paroxysmal SVT  -ECGs consistent with SVT with a pathway  -TSH WNL  -Recent NST (4/14/25) normal myocardial perfusion  -She had an episode of SOB overnight with O2 sat 91% on room air and tele did not review any arrhythmia.   -Tele: SR at 60-90's, no further SVT since started on flecainide/metoprolol   -Continue metoprolol XL 50mg QD  -Continue flecainide 50mg Q12hr   -Infectious w/u per ID rec, BCx x 2 sent on 4/17 with pending result, monitor off abx per ID recs.  -Continue telemetry monitor  -No further EP workup as inpatient. EP will sign off. Reconsult as needed.  -She may benefit from EPS/ablation as outpatient   -Follow up with Dr. Raza in 6 weeks as outpatient (our office will contact pt for appt)     FRAN Rock  Can reach me via Teams

## 2025-04-17 NOTE — PATIENT PROFILE ADULT - NSPROPTRIGHTNOTIFY_GEN_A_NUR
Cycle 6 day 1 ABVD- no bleomycin  Nursing Assessment Note      Date:  Aug 24, 2018    Name:  Ga Landrum   :  Oct 19, 1985    Diagnosis: Primary C81.90 - Hodgkin lymphoma, unspecified, unspecified site,  Diagnosed 2018  (Active)   Primary E79.0 - Hyperuricemia without signs of inflammatory arthritis and tophaceous disease,  Diagnosed 2018  (Active)   Primary R59.1 - Generalized enlarged lymph nodes,  Diagnosed 2018  (Active)    Treatment Regimen:     ABVD    Last Treatment Date:  Aug 10, 2018    Toxicities:  The following toxicities were assessed as a 2:  Alopecia - Hair loss of >=50% normal for that individual that is readily apparent to others; a wig or hair piece is necessary if the patient desires to completely camouflage the hair loss; associated with psychosocial impact  Vomiting - 3 - 5 episodes ( by 5 minutes) in 24 hrs, Anticipatory nausea and vomiting, pt. did not take ativan premedication.  Dr. Hunter ordered IV ativan and benadryl.  Pt. vomited x 3 during infusion.    The following toxicities were assessed as a 1:  Anxiety - Mild symptoms; intervention not indicated, Pt reports significant anticipatory anxiety every time \"I walk into this place.\" 2 additional cycles added to tx r/t residual right axilla lymph nodes.  Constipation - Occasional or intermittent symptoms; occasional use of stool softeners, laxatives, dietary modification, or enema, Taking stool softeners prn  Depression - Mild depressive symptoms  Fatigue - Fatigue relieved by rest, encouraged pt. to increase physical activity.Nausea - Loss of appetite without alteration in eating habits, Pt reports nausea medication no very helpful, still takes it    Vital Signs:  Performed on Aug 24, 2018 09:59  Height - 176.20 cms   Weight - 105.6 kg   BSA - 2.21 sq.m   BMI - 34.0100 (HIGH)   Temperature - 96.6 F   Pulse - 90 /min   Respiration - 18 /min   BP - 148/88 mm(hg) (HIGH)   Pain - 0      Allergies:  No  Known Allergies.  Allergies were reviewed.  No new allergies.    Medications:   The medication list was reviewed. No changes noted.      Psychosocial assessment:  No new psychosocial concerns    ECOG performance status:   0 fully functional         Contraceptive Review:  Patient informed to use barrier method contraception.    Labs reviewed with the patient:  yes        Note:   s/o:  Pt. here with sister for cycle 6 day 1 ABVD- no bleomycin, pt. was seen and examined by Dr. Hunter. Labs  reviewed and okay to proceed with ANC of 0.9, neutropenic precautions reviewed.   Port accessed under sterile technique with  excellent blood return noted.   Chemo given as ordered and pt. tolerated well.   Discharge instructions reviewed in detail, refer to note.  Order for PET and calendar provided for pt.   a;  Cycle 6 day 1 ABVD- no bleomycin  p:  RTC per calendar, pt. advised to call with any problems ,questions or concerns.  Pt. and sister  verbalized understanding.  Questions were answered and understanding was verbalized.  Electronically signed byJackie        declines

## 2025-04-17 NOTE — CONSULT NOTE ADULT - ASSESSMENT
64F estate planning  hx HTN, MAURICIO previously on IV iron, recent admission for odynophagia/dysphagia, elevated inflammatory markers presents with palpitations and chest pain. Reports symptoms started last night at around 12:30 am, intermittent, pt was in bed trying to sleep but awoke from her symptoms which prompted her to come to the ED    Anemia  --[facility]  --CT a/p w/o acute intra-abdominal findings.  --scheduled for IV iron outpatient, Venofer x5 ordered to be given while inpatient  --Hgb ~8.5 at baseline , now lower  --suspect worsening anemia is 2/2 consumption from inflammation  --folate, ferritin, B12 adequate, tot iron low  --ongoing care after discharge    odynophagia/dysphagia  --management per rheum  --consider S&S Asya walker NP  Hematology/ Oncology  [facility]  [phone] (office)  [phone] (alt office)  Evenings and weekends please call MD on call or office

## 2025-04-17 NOTE — CONSULT NOTE ADULT - SUBJECTIVE AND OBJECTIVE BOX
STEFAN WASHINGTON  [id number]    HISTORY OF PRESENT ILLNESS:  "64F estate planning  hx HTN, MAURICIO previously on IV iron, recent admission for odynophagia/dysphagia, elevated inflammatory markers presents with palpitations and chest pain. Reports symptoms started last night at around 12:30 am, intermittent, pt was in bed trying to sleep but awoke from her symptoms which prompted her to come to the ED. Reports associated dyspnea. Also reports odynophagia/dysphagia, worked as an outpt/prior admission,  had recent EGD and was scheduled to have an outpt manommetry and gastric emptying study. Denies sob, abdominal pain, n/v, diarrhea, cough, chills, no recent travel hx or sick contracts. ,      Rheum ROS    Denies fevers/chills/weight loss/night sweats/alopecia/sinus disease/asthma history/oral ulcers/dysphagia/vision changes/Raynauds/VTE/miscarraiges/sicca symptoms/urinary changes/edema/SOB/joint pain/swelling/jaw claudication/rash/photosensitivity/morning stiffness    Endorses fevers/chills/weight loss/night sweats/alopecia/sinus disease/asthma history/oral ulcers/dysphagia/vision changes/Raynauds/VTE/miscarraiges/sicca symptoms/urinary changes/edema/SOB/joint pain/swelling/jaw claudication/rash/photosensitivity/morning stiffness      MEDICATIONS  (STANDING):  dextrose 5%. 1000 milliLiter(s) (50 mL/Hr) IV Continuous <Continuous>  dextrose 5%. 1000 milliLiter(s) (100 mL/Hr) IV Continuous <Continuous>  dextrose 50% Injectable 25 Gram(s) IV Push once  dextrose 50% Injectable 12.5 Gram(s) IV Push once  dextrose 50% Injectable 25 Gram(s) IV Push once  dextrose Oral Gel 15 Gram(s) Oral once  flecainide 50 milliGRAM(s) Oral every 12 hours  gabapentin 300 milliGRAM(s) Oral at bedtime  glucagon  Injectable 1 milliGRAM(s) IntraMuscular once  heparin   Injectable 5000 Unit(s) SubCutaneous every 8 hours  metoprolol succinate ER 50 milliGRAM(s) Oral daily    MEDICATIONS  (PRN):  acetaminophen     Tablet .. 650 milliGRAM(s) Oral every 6 hours PRN Temp greater or equal to 38C (100.4F), Mild Pain (1 - 3)      Allergies    NSAIDs (Hives)    Intolerances        PERTINENT MEDICATION HISTORY:    SOCIAL HISTORY:  OCCUPATION:  TRAVEL HISTORY:    FAMILY HISTORY:      Vital Signs Last 24 Hrs  T(C): 37.2 (17 Apr 2025 12:00), Max: 37.3 (17 Apr 2025 04:32)  T(F): 98.9 (17 Apr 2025 12:00), Max: 99.1 (17 Apr 2025 04:32)  HR: 88 (17 Apr 2025 12:00) (83 - 93)  BP: 125/78 (17 Apr 2025 12:00) (117/77 - 143/76)  BP(mean): 83 (16 Apr 2025 13:50) (83 - 83)  RR: 18 (17 Apr 2025 12:00) (18 - 20)  SpO2: 95% (17 Apr 2025 12:00) (91% - 96%)    Parameters below as of 17 Apr 2025 12:00  Patient On (Oxygen Delivery Method): room air        ROS as noted above     Physical Exam:  General: No apparent distress  HEENT: EOMI, MMM  CVS: +S1/S2, RRR, no murmurs/rubs/gallops  Resp: CTA b/l. No crackles/wheezing  GI: Soft, NT/ND +BS  MSK: no swelling/warmth/erythema of the joints of the UE/LE  Neuro: AAOx3  Skin: no visible rashes    LABS:                        7.4    12.84 )-----------( 350      ( 17 Apr 2025 07:17 )             24.4     04-17    133[L]  |  100  |  8   ----------------------------<  53[LL]  4.3   |  19[L]  |  0.55    Ca    8.1[L]      17 Apr 2025 07:17  Phos  3.2     04-17  Mg     1.9     04-17    TPro  6.1  /  Alb  2.2[L]  /  TBili  0.3  /  DBili  x   /  AST  32  /  ALT  21  /  AlkPhos  80  04-17    PT/INR - ( 16 Apr 2025 04:53 )   PT: 13.9 sec;   INR: 1.21 ratio         PTT - ( 16 Apr 2025 04:53 )  PTT:27.2 sec  Urinalysis Basic - ( 17 Apr 2025 07:17 )    Color: x / Appearance: x / SG: x / pH: x  Gluc: 53 mg/dL / Ketone: x  / Bili: x / Urobili: x   Blood: x / Protein: x / Nitrite: x   Leuk Esterase: x / RBC: x / WBC x   Sq Epi: x / Non Sq Epi: x / Bacteria: x            Rheumatology Work Up    Sedimentation Rate, Erythrocyte: 120 mm/hr *H* [0 - 20] (04-17-25 @ 07:17)  C-Reactive Protein: 135 mg/L *H* [0 - 4] (04-17-25 @ 07:17)  C3 Complement, Serum: 116 mg/dL [81 - 157] (03-09-25 @ 06:41)  C4 Complement, Serum: 19 mg/dL [13 - 39] (03-09-25 @ 06:41)            RADIOLOGY & ADDITIONAL STUDIES:    < from: CT Angio Chest PE Protocol w/ IV Cont (04.16.25 @ 06:21) >  IMPRESSION:    No pulmonary embolism within the confines of this exam.    Small bilateral pleural effusions and bibasilar atelectasis.    Cardiomegaly. Moderate sized pericardial effusion compared with prior   exam from 3/7/2025.    Preliminary findings were discussed with Dr. Green [phone] 4/16/2025   6:32 AM by Dr. Munoz with read back confirmation.    < end of copied text >    < from: POCUS ED TTE 2D F/U, Limited w/o Cont. (04.16.25 @ 08:11) >  MPRESSION:  Small pericardial effusion without sonographic evidence of cardiac   tamponade.  Preserved LV systolic function.    < end of copied text >   STEFAN WASHINGTON  [id number]    HISTORY OF PRESENT ILLNESS:  "64F estate planning  hx HTN, MAURICIO previously on IV iron, recent admission for odynophagia/dysphagia, elevated inflammatory markers presents with palpitations and chest pain. Reports symptoms started last night at around 12:30 am, intermittent, pt was in bed trying to sleep but awoke from her symptoms which prompted her to come to the ED. Reports associated dyspnea. Also reports odynophagia/dysphagia, worked as an outpt/prior admission,  had recent EGD and was scheduled to have an outpt manommetry and gastric emptying study. Denies sob, abdominal pain, n/v, diarrhea, cough, chills, no recent travel hx or sick contracts. ,    Symptoms started in December, initially with SOB. Saw pulm outpt, was not able to find cause, saw ENT who scoped and afterwards started to have dysphagia and odynophagia. Then around January, pt started to have myalgias, initially started hips, then resolved after 10 days, then upper thighs, currently more upper arms. Has been having shoulder pains past 3 weeks. Feels weak, unable to lift arms above head, and overall very fatigued, has trouble getting out of bed and chairs.Pt also noted palpitations and chest pain, which brought her to hospital. Endorses weight loss of 30 pounds in 2 months, neck pain radiating to legs, otherwise tricia, rashes, alopecia, sicca symptoms, oral ulcers, urinary changes. She denies hx of Raynaud's. Reports family history of RA.        Rheum ROS  as above      MEDICATIONS  (STANDING):  dextrose 5%. 1000 milliLiter(s) (50 mL/Hr) IV Continuous <Continuous>  dextrose 5%. 1000 milliLiter(s) (100 mL/Hr) IV Continuous <Continuous>  dextrose 50% Injectable 25 Gram(s) IV Push once  dextrose 50% Injectable 12.5 Gram(s) IV Push once  dextrose 50% Injectable 25 Gram(s) IV Push once  dextrose Oral Gel 15 Gram(s) Oral once  flecainide 50 milliGRAM(s) Oral every 12 hours  gabapentin 300 milliGRAM(s) Oral at bedtime  glucagon  Injectable 1 milliGRAM(s) IntraMuscular once  heparin   Injectable 5000 Unit(s) SubCutaneous every 8 hours  metoprolol succinate ER 50 milliGRAM(s) Oral daily    MEDICATIONS  (PRN):  acetaminophen     Tablet .. 650 milliGRAM(s) Oral every 6 hours PRN Temp greater or equal to 38C (100.4F), Mild Pain (1 - 3)      Allergies    NSAIDs (Hives)    Intolerances        PERTINENT MEDICATION HISTORY: gabapentin    SOCIAL HISTORY: did not obtain  OCCUPATION: did not obtain  TRAVEL HISTORY: [place] in Dec    FAMILY HISTORY: RA in cousin      Vital Signs Last 24 Hrs  T(C): 37.2 (17 Apr 2025 12:00), Max: 37.3 (17 Apr 2025 04:32)  T(F): 98.9 (17 Apr 2025 12:00), Max: 99.1 (17 Apr 2025 04:32)  HR: 88 (17 Apr 2025 12:00) (83 - 93)  BP: 125/78 (17 Apr 2025 12:00) (117/77 - 143/76)  BP(mean): 83 (16 Apr 2025 13:50) (83 - 83)  RR: 18 (17 Apr 2025 12:00) (18 - 20)  SpO2: 95% (17 Apr 2025 12:00) (91% - 96%)    Parameters below as of 17 Apr 2025 12:00  Patient On (Oxygen Delivery Method): room air        ROS as noted above     Physical Exam:  General: No apparent distress  HEENT: EOMI, MMM, no oral ulcers  CVS: +S1/S2  Resp: CTA b/l  GI: Soft, NT/ND   MSK: no swelling/warmth/erythema of the joints of the UE/LE. proximal UE 2/5, LE hip flexors 4/5, distal strength 5/5  Neuro: AAOx3  Skin: no visible rashes    LABS:                        7.4    12.84 )-----------( 350      ( 17 Apr 2025 07:17 )             24.4     04-17    133[L]  |  100  |  8   ----------------------------<  53[LL]  4.3   |  19[L]  |  0.55    Ca    8.1[L]      17 Apr 2025 07:17  Phos  3.2     04-17  Mg     1.9     04-17    TPro  6.1  /  Alb  2.2[L]  /  TBili  0.3  /  DBili  x   /  AST  32  /  ALT  21  /  AlkPhos  80  04-17    PT/INR - ( 16 Apr 2025 04:53 )   PT: 13.9 sec;   INR: 1.21 ratio         PTT - ( 16 Apr 2025 04:53 )  PTT:27.2 sec  Urinalysis Basic - ( 17 Apr 2025 07:17 )    Color: x / Appearance: x / SG: x / pH: x  Gluc: 53 mg/dL / Ketone: x  / Bili: x / Urobili: x   Blood: x / Protein: x / Nitrite: x   Leuk Esterase: x / RBC: x / WBC x   Sq Epi: x / Non Sq Epi: x / Bacteria: x            Rheumatology Work Up    Sedimentation Rate, Erythrocyte: 120 mm/hr *H* [0 - 20] (04-17-25 @ 07:17)  C-Reactive Protein: 135 mg/L *H* [0 - 4] (04-17-25 @ 07:17)  C3 Complement, Serum: 116 mg/dL [81 - 157] (03-09-25 @ 06:41)  C4 Complement, Serum: 19 mg/dL [13 - 39] (03-09-25 @ 06:41)            RADIOLOGY & ADDITIONAL STUDIES:    < from: CT Angio Chest PE Protocol w/ IV Cont (04.16.25 @ 06:21) >  IMPRESSION:    No pulmonary embolism within the confines of this exam.    Small bilateral pleural effusions and bibasilar atelectasis.    Cardiomegaly. Moderate sized pericardial effusion compared with prior   exam from 3/7/2025.    Preliminary findings were discussed with Dr. Green [phone] 4/16/2025   6:32 AM by Dr. Munoz with read back confirmation.    < end of copied text >    < from: POCUS ED TTE 2D F/U, Limited w/o Cont. (04.16.25 @ 08:11) >  MPRESSION:  Small pericardial effusion without sonographic evidence of cardiac   tamponade.  Preserved LV systolic function.    < end of copied text >   STEFAN WASHINGTON  [id number]    HISTORY OF PRESENT ILLNESS:  "64F estate planning  hx HTN, MAURICIO previously on IV iron, recent admission for odynophagia/dysphagia, elevated inflammatory markers presents with palpitations and chest pain. Reports symptoms started last night at around 12:30 am, intermittent, pt was in bed trying to sleep but awoke from her symptoms which prompted her to come to the ED. Reports associated dyspnea. Also reports odynophagia/dysphagia, worked as an outpt/prior admission,  had recent EGD and was scheduled to have an outpt manommetry and gastric emptying study. Denies sob, abdominal pain, n/v, diarrhea, cough, chills, no recent travel hx or sick contracts. ,    Symptoms started in December, initially with SOB. Saw pulm outpt, was not able to find cause, saw ENT who scoped and afterwards started to have dysphagia and odynophagia. Then around January, pt started to have myalgias, initially started hips, then resolved after 10 days, then upper thighs, currently more upper arms. Has been having shoulder pains past 3 weeks. Feels weak, unable to lift arms above head, and overall very fatigued, has trouble getting out of bed and chairs.Pt also noted palpitations and chest pain, which brought her to hospital. Endorses weight loss of 30 pounds in 2 months, neck pain radiating to legs, otherwise tricia, rashes, alopecia, sicca symptoms, oral ulcers, urinary changes. She denies hx of Raynaud's. Reports family history of RA.        Rheum ROS  as above      MEDICATIONS  (STANDING):  dextrose 5%. 1000 milliLiter(s) (50 mL/Hr) IV Continuous <Continuous>  dextrose 5%. 1000 milliLiter(s) (100 mL/Hr) IV Continuous <Continuous>  dextrose 50% Injectable 25 Gram(s) IV Push once  dextrose 50% Injectable 12.5 Gram(s) IV Push once  dextrose 50% Injectable 25 Gram(s) IV Push once  dextrose Oral Gel 15 Gram(s) Oral once  flecainide 50 milliGRAM(s) Oral every 12 hours  gabapentin 300 milliGRAM(s) Oral at bedtime  glucagon  Injectable 1 milliGRAM(s) IntraMuscular once  heparin   Injectable 5000 Unit(s) SubCutaneous every 8 hours  metoprolol succinate ER 50 milliGRAM(s) Oral daily    MEDICATIONS  (PRN):  acetaminophen     Tablet .. 650 milliGRAM(s) Oral every 6 hours PRN Temp greater or equal to 38C (100.4F), Mild Pain (1 - 3)      Allergies    NSAIDs (Hives)    Intolerances        PERTINENT MEDICATION HISTORY: gabapentin    SOCIAL HISTORY: did not obtain  OCCUPATION: did not obtain  TRAVEL HISTORY: [place] in Dec    FAMILY HISTORY: RA in cousin      Vital Signs Last 24 Hrs  T(C): 37.2 (17 Apr 2025 12:00), Max: 37.3 (17 Apr 2025 04:32)  T(F): 98.9 (17 Apr 2025 12:00), Max: 99.1 (17 Apr 2025 04:32)  HR: 88 (17 Apr 2025 12:00) (83 - 93)  BP: 125/78 (17 Apr 2025 12:00) (117/77 - 143/76)  BP(mean): 83 (16 Apr 2025 13:50) (83 - 83)  RR: 18 (17 Apr 2025 12:00) (18 - 20)  SpO2: 95% (17 Apr 2025 12:00) (91% - 96%)    Parameters below as of 17 Apr 2025 12:00  Patient On (Oxygen Delivery Method): room air        ROS as noted above     Physical Exam:  General: No apparent distress  HEENT: EOMI, MMM, no oral ulcers  CVS: +S1/S2  Resp: CTA b/l  GI: Soft, NT/ND   MSK: no swelling/warmth/erythema of the joints of the UE/LE. proximal UE 2/5, LE hip flexors 4/5, distal strength 5/5. Point tenderness lower spine  Neuro: AAOx3  Skin: no visible rashes    LABS:                        7.4    12.84 )-----------( 350      ( 17 Apr 2025 07:17 )             24.4     04-17    133[L]  |  100  |  8   ----------------------------<  53[LL]  4.3   |  19[L]  |  0.55    Ca    8.1[L]      17 Apr 2025 07:17  Phos  3.2     04-17  Mg     1.9     04-17    TPro  6.1  /  Alb  2.2[L]  /  TBili  0.3  /  DBili  x   /  AST  32  /  ALT  21  /  AlkPhos  80  04-17    PT/INR - ( 16 Apr 2025 04:53 )   PT: 13.9 sec;   INR: 1.21 ratio         PTT - ( 16 Apr 2025 04:53 )  PTT:27.2 sec  Urinalysis Basic - ( 17 Apr 2025 07:17 )    Color: x / Appearance: x / SG: x / pH: x  Gluc: 53 mg/dL / Ketone: x  / Bili: x / Urobili: x   Blood: x / Protein: x / Nitrite: x   Leuk Esterase: x / RBC: x / WBC x   Sq Epi: x / Non Sq Epi: x / Bacteria: x            Rheumatology Work Up    Sedimentation Rate, Erythrocyte: 120 mm/hr *H* [0 - 20] (04-17-25 @ 07:17)  C-Reactive Protein: 135 mg/L *H* [0 - 4] (04-17-25 @ 07:17)  C3 Complement, Serum: 116 mg/dL [81 - 157] (03-09-25 @ 06:41)  C4 Complement, Serum: 19 mg/dL [13 - 39] (03-09-25 @ 06:41)            RADIOLOGY & ADDITIONAL STUDIES:    < from: CT Angio Chest PE Protocol w/ IV Cont (04.16.25 @ 06:21) >  IMPRESSION:    No pulmonary embolism within the confines of this exam.    Small bilateral pleural effusions and bibasilar atelectasis.    Cardiomegaly. Moderate sized pericardial effusion compared with prior   exam from 3/7/2025.    Preliminary findings were discussed with Dr. Green [phone] 4/16/2025   6:32 AM by Dr. Munoz with read back confirmation.    < end of copied text >    < from: POCUS ED TTE 2D F/U, Limited w/o Cont. (04.16.25 @ 08:11) >  MPRESSION:  Small pericardial effusion without sonographic evidence of cardiac   tamponade.  Preserved LV systolic function.    < end of copied text >   STEFNA WASHINGTON  [id number]    HISTORY OF PRESENT ILLNESS:  "64F estate planning  hx HTN, MAURICIO previously on IV iron, recent admission for odynophagia/dysphagia, elevated inflammatory markers presents with palpitations and chest pain. Reports symptoms started last night at around 12:30 am, intermittent, pt was in bed trying to sleep but awoke from her symptoms which prompted her to come to the ED. Reports associated dyspnea. Also reports odynophagia/dysphagia, worked as an outpt/prior admission,  had recent EGD and was scheduled to have an outpt manommetry and gastric emptying study. Denies sob, abdominal pain, n/v, diarrhea, cough, chills, no recent travel hx or sick contracts. ,    Symptoms started in December, initially with SOB. Saw pulm outpt, was not able to find cause, saw ENT who scoped and afterwards started to have dysphagia and odynophagia. Then around January, pt started to have myalgias, initially started hips, then resolved after 10 days, then upper thighs, currently more upper arms. Has been having shoulder pains past 3 weeks. Feels weak, unable to lift arms above head, and overall very fatigued, has trouble getting out of bed and chairs.Pt also noted palpitations and chest pain, which brought her to hospital. Endorses weight loss of 30 pounds in 2 months, neck pain radiating to legs, otherwise tricia, rashes, alopecia, sicca symptoms, oral ulcers, urinary changes. She denies hx of Raynaud's. Reports family history of RA.        Rheum ROS  as above      MEDICATIONS  (STANDING):  dextrose 5%. 1000 milliLiter(s) (50 mL/Hr) IV Continuous <Continuous>  dextrose 5%. 1000 milliLiter(s) (100 mL/Hr) IV Continuous <Continuous>  dextrose 50% Injectable 25 Gram(s) IV Push once  dextrose 50% Injectable 12.5 Gram(s) IV Push once  dextrose 50% Injectable 25 Gram(s) IV Push once  dextrose Oral Gel 15 Gram(s) Oral once  flecainide 50 milliGRAM(s) Oral every 12 hours  gabapentin 300 milliGRAM(s) Oral at bedtime  glucagon  Injectable 1 milliGRAM(s) IntraMuscular once  heparin   Injectable 5000 Unit(s) SubCutaneous every 8 hours  metoprolol succinate ER 50 milliGRAM(s) Oral daily    MEDICATIONS  (PRN):  acetaminophen     Tablet .. 650 milliGRAM(s) Oral every 6 hours PRN Temp greater or equal to 38C (100.4F), Mild Pain (1 - 3)      Allergies    NSAIDs (Hives)    Intolerances        PERTINENT MEDICATION HISTORY: gabapentin    SOCIAL HISTORY: did not obtain  OCCUPATION: did not obtain  TRAVEL HISTORY: [place] in [place]    FAMILY HISTORY: RA in cousin      Vital Signs Last 24 Hrs  T(C): 37.2 (17 Apr 2025 12:00), Max: 37.3 (17 Apr 2025 04:32)  T(F): 98.9 (17 Apr 2025 12:00), Max: 99.1 (17 Apr 2025 04:32)  HR: 88 (17 Apr 2025 12:00) (83 - 93)  BP: 125/78 (17 Apr 2025 12:00) (117/77 - 143/76)  BP(mean): 83 (16 Apr 2025 13:50) (83 - 83)  RR: 18 (17 Apr 2025 12:00) (18 - 20)  SpO2: 95% (17 Apr 2025 12:00) (91% - 96%)    Parameters below as of 17 Apr 2025 12:00  Patient On (Oxygen Delivery Method): room air        ROS as noted above     Physical Exam:  General: No apparent distress  HEENT: EOMI, MMM, no oral ulcers  CVS: +S1/S2  Resp: CTA b/l  GI: Soft, NT/ ND   MSK: tenderness over  L3-4 spinal processes, no swelling/warmth/erythema of the joints of the UE/LE. proximal UE 2/5, LE hip flexors 4/5, distal strength 5/5. Point tenderness lower spine  Neuro: AAOx3  Skin: no visible rashes    LABS:                        7.4    12.84 )-----------( 350      ( 17 Apr 2025 07:17 )             24.4     04-17    133[L]  |  100  |  8   ----------------------------<  53[LL]  4.3   |  19[L]  |  0.55    Ca    8.1[L]      17 Apr 2025 07:17  Phos  3.2     04-17  Mg     1.9     04-17    TPro  6.1  /  Alb  2.2[L]  /  TBili  0.3  /  DBili  x   /  AST  32  /  ALT  21  /  AlkPhos  80  04-17    PT/INR - ( 16 Apr 2025 04:53 )   PT: 13.9 sec;   INR: 1.21 ratio         PTT - ( 16 Apr 2025 04:53 )  PTT:27.2 sec  Urinalysis Basic - ( 17 Apr 2025 07:17 )    Color: x / Appearance: x / SG: x / pH: x  Gluc: 53 mg/dL / Ketone: x  / Bili: x / Urobili: x   Blood: x / Protein: x / Nitrite: x   Leuk Esterase: x / RBC: x / WBC x   Sq Epi: x / Non Sq Epi: x / Bacteria: x            Rheumatology Work Up    Sedimentation Rate, Erythrocyte: 120 mm/hr *H* [0 - 20] (04-17-25 @ 07:17)  C-Reactive Protein: 135 mg/L *H* [0 - 4] (04-17-25 @ 07:17)  C3 Complement, Serum: 116 mg/dL [81 - 157] (03-09-25 @ 06:41)  C4 Complement, Serum: 19 mg/dL [13 - 39] (03-09-25 @ 06:41)            RADIOLOGY & ADDITIONAL STUDIES:    < from: CT Angio Chest PE Protocol w/ IV Cont (04.16.25 @ 06:21) >  IMPRESSION:    No pulmonary embolism within the confines of this exam.    Small bilateral pleural effusions and bibasilar atelectasis.    Cardiomegaly. Moderate sized pericardial effusion compared with prior   exam from 3/7/2025.    Preliminary findings were discussed with Dr. Green [phone] 4/16/2025   6:32 AM by Dr. Munoz with read back confirmation.    < end of copied text >    < from: POCUS ED TTE 2D F/U, Limited w/o Cont. (04.16.25 @ 08:11) >  MPRESSION:  Small pericardial effusion without sonographic evidence of cardiac   tamponade.  Preserved LV systolic function.    < end of copied text >

## 2025-04-17 NOTE — PATIENT PROFILE ADULT - HISTORY OF COVID-19 VACCINATION
Problem: Falls - Risk of  Goal: *Absence of Falls  Document Migel Fall Risk and appropriate interventions in the flowsheet. Outcome: Progressing Towards Goal  Fall Risk Interventions:  Mobility Interventions: Patient to call before getting OOB         Medication Interventions: Patient to call before getting OOB    Elimination Interventions: Patient to call for help with toileting needs             Problem: Pressure Injury - Risk of  Goal: *Prevention of pressure injury  Document Nagi Scale and appropriate interventions in the flowsheet.   Outcome: Progressing Towards Goal  Pressure Injury Interventions:       Moisture Interventions: Absorbent underpads    Activity Interventions: Increase time out of bed    Mobility Interventions: Pressure redistribution bed/mattress (bed type)    Nutrition Interventions: Document food/fluid/supplement intake Yes

## 2025-04-17 NOTE — CONSULT NOTE ADULT - ATTENDING COMMENTS
I personally interviewed and examined the patient. Agree with rheumatology fellow's note with my edits as above.   The diagnosis is not clear at this time - diffuse proximal myalgia and high inflammatory markers may be suggestive of PMR, but serositis is not commonly seen in PMR. If pleural and pericardial effusion inflammatory- may be observes in Sjogren's syndrome. 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 Would r/o infection- MRI with contrast to r/o discitis and if no evidence of infection would start a coarse of steroids. May consider US hands/ shoulders to r/o synovitis.

## 2025-04-17 NOTE — PROGRESS NOTE ADULT - SUBJECTIVE AND OBJECTIVE BOX
24H hour events:     MEDICATIONS:  flecainide 50 milliGRAM(s) Oral every 12 hours  heparin   Injectable 5000 Unit(s) SubCutaneous every 8 hours  metoprolol succinate ER 50 milliGRAM(s) Oral daily        acetaminophen     Tablet .. 650 milliGRAM(s) Oral every 6 hours PRN  gabapentin 200 milliGRAM(s) Oral at bedtime      dextrose 50% Injectable 25 Gram(s) IV Push once  dextrose 50% Injectable 12.5 Gram(s) IV Push once  dextrose 50% Injectable 25 Gram(s) IV Push once  dextrose Oral Gel 15 Gram(s) Oral once  glucagon  Injectable 1 milliGRAM(s) IntraMuscular once    dextrose 5%. 1000 milliLiter(s) IV Continuous <Continuous>  dextrose 5%. 1000 milliLiter(s) IV Continuous <Continuous>  iron sucrose IVPB 200 milliGRAM(s) IV Intermittent every 24 hours      REVIEW OF SYSTEMS:  Complete 12 point ROS negative.    PHYSICAL EXAM:  T(C): 37.3 (04-17-25 @ 04:32), Max: 37.3 (04-17-25 @ 04:32)  HR: 90 (04-17-25 @ 04:32) (83 - 98)  BP: 143/76 (04-17-25 @ 04:32) (104/58 - 143/76)  RR: 18 (04-17-25 @ 04:32) (18 - 20)  SpO2: 92% (04-17-25 @ 04:32) (91% - 96%)  Wt(kg): --  I&O's Summary    16 Apr 2025 07:01  -  17 Apr 2025 07:00  --------------------------------------------------------  IN: 240 mL / OUT: 750 mL / NET: -510 mL        Appearance: Normal	  HEENT: PERRL, EOMI	  Cardiovascular: Normal S1 S2, No JVD, No murmurs  Respiratory: Lungs clear to auscultation	  Psychiatry: A & O x 3, Mood & affect appropriate  Gastrointestinal:  Soft, Non-tender, + BS	  Skin: No rashes, No ecchymoses, No cyanosis	  Neurologic: Grossly intact  Extremities: No clubbing, cyanosis or edema  Vascular: Peripheral pulses palpable 2+ bilaterally        LABS:	 	    CBC Full  -  ( 17 Apr 2025 07:17 )  WBC Count : 12.84 K/uL  Hemoglobin : 7.4 g/dL  Hematocrit : 24.4 %  Platelet Count - Automated : 350 K/uL  Mean Cell Volume : 72.8 fl  Mean Cell Hemoglobin : 22.1 pg  Mean Cell Hemoglobin Concentration : 30.3 g/dL  Auto Neutrophil # : x  Auto Lymphocyte # : x  Auto Monocyte # : x  Auto Eosinophil # : x  Auto Basophil # : x  Auto Neutrophil % : x  Auto Lymphocyte % : x  Auto Monocyte % : x  Auto Eosinophil % : x  Auto Basophil % : x    04-17    133[L]  |  100  |  8   ----------------------------<  53[LL]  4.3   |  19[L]  |  0.55  04-16    131[L]  |  95[L]  |  10  ----------------------------<  110[H]  4.5   |  21[L]  |  0.60    Ca    8.1[L]      17 Apr 2025 07:17  Ca    8.7      16 Apr 2025 04:53  Phos  3.2     04-17  Mg     1.9     04-17  Mg     1.9     04-16    TPro  6.1  /  Alb  2.2[L]  /  TBili  0.3  /  DBili  x   /  AST  32  /  ALT  21  /  AlkPhos  80  04-17  TPro  7.3  /  Alb  2.6[L]  /  TBili  0.4  /  DBili  x   /  AST  41[H]  /  ALT  28  /  AlkPhos  84  04-16      proBNP:   Lipid Profile:   HgA1c:   TSH:       CARDIAC MARKERS:          TELEMETRY: 	    ECG:  	  RADIOLOGY:  OTHER: 	    PREVIOUS DIAGNOSTIC TESTING:    [ ] Echocardiogram:    [ ]  Catheterization:  [ ] Stress Test:  	  	  ASSESSMENT/PLAN: 	     24H hour events: Pt without new complaints, she had an episode of SOB overnight with O2 sat 91% on room air and tele did not review any arrhythmia. Tele: SR at 60-90's, no further SVT since started on flecainide/metoprolol     MEDICATIONS:  flecainide 50 milliGRAM(s) Oral every 12 hours  heparin   Injectable 5000 Unit(s) SubCutaneous every 8 hours  metoprolol succinate ER 50 milliGRAM(s) Oral daily  acetaminophen     Tablet .. 650 milliGRAM(s) Oral every 6 hours PRN  gabapentin 200 milliGRAM(s) Oral at bedtime  dextrose 50% Injectable 25 Gram(s) IV Push once  dextrose 50% Injectable 12.5 Gram(s) IV Push once  dextrose 50% Injectable 25 Gram(s) IV Push once  dextrose Oral Gel 15 Gram(s) Oral once  glucagon  Injectable 1 milliGRAM(s) IntraMuscular once  dextrose 5%. 1000 milliLiter(s) IV Continuous <Continuous>  dextrose 5%. 1000 milliLiter(s) IV Continuous <Continuous>  iron sucrose IVPB 200 milliGRAM(s) IV Intermittent every 24 hours    REVIEW OF SYSTEMS:  Complete 12 point ROS negative.    PHYSICAL EXAM:  T(C): 37.3 (04-17-25 @ 04:32), Max: 37.3 (04-17-25 @ 04:32)  HR: 90 (04-17-25 @ 04:32) (83 - 98)  BP: 143/76 (04-17-25 @ 04:32) (104/58 - 143/76)  RR: 18 (04-17-25 @ 04:32) (18 - 20)  SpO2: 92% (04-17-25 @ 04:32) (91% - 96%)  Wt(kg): --  I&O's Summary    16 Apr 2025 07:01  -  17 Apr 2025 07:00  --------------------------------------------------------  IN: 240 mL / OUT: 750 mL / NET: -510 mL        Appearance: Normal	  HEENT: PERRL, EOMI	  Cardiovascular: Normal S1 S2, RRR, No JVD, No murmurs  Respiratory: Lungs clear to auscultation	  Psychiatry: A & O x 3, Mood & affect appropriate  Gastrointestinal: Soft, Non-tender, + BS	  Skin: No rashes, No ecchymoses, No cyanosis	  Neurologic: Grossly intact  Extremities: No clubbing, cyanosis or edema  Vascular: Peripheral pulses palpable 2+ bilaterally        LABS:	 	    CBC Full  -  ( 17 Apr 2025 07:17 )  WBC Count : 12.84 K/uL  Hemoglobin : 7.4 g/dL  Hematocrit : 24.4 %  Platelet Count - Automated : 350 K/uL  Mean Cell Volume : 72.8 fl  Mean Cell Hemoglobin : 22.1 pg  Mean Cell Hemoglobin Concentration : 30.3 g/dL  Auto Neutrophil # : x  Auto Lymphocyte # : x  Auto Monocyte # : x  Auto Eosinophil # : x  Auto Basophil # : x  Auto Neutrophil % : x  Auto Lymphocyte % : x  Auto Monocyte % : x  Auto Eosinophil % : x  Auto Basophil % : x    04-17    133[L]  |  100  |  8   ----------------------------<  53[LL]  4.3   |  19[L]  |  0.55  04-16    131[L]  |  95[L]  |  10  ----------------------------<  110[H]  4.5   |  21[L]  |  0.60    Ca    8.1[L]      17 Apr 2025 07:17  Ca    8.7      16 Apr 2025 04:53  Phos  3.2     04-17  Mg     1.9     04-17  Mg     1.9     04-16    TPro  6.1  /  Alb  2.2[L]  /  TBili  0.3  /  DBili  x   /  AST  32  /  ALT  21  /  AlkPhos  80  04-17  TPro  7.3  /  Alb  2.6[L]  /  TBili  0.4  /  DBili  x   /  AST  41[H]  /  ALT  28  /  AlkPhos  84  04-16    Thyroid Stimulating Hormone, Serum in AM (04.17.25 @ 07:17)    Thyroid Stimulating Hormone, Serum: 0.70 uIU/mL      TELEMETRY: SR at 60-90's, no further SVT since started on flecainide/metoprolol 	      < from: TTE W or WO Ultrasound Enhancing Agent (03.11.25 @ 09:38) >  CONCLUSIONS:     1. Left ventricular cavity is small. Left ventricular wall thickness is normal. Left ventricular systolic function is hyperdynamic with an ejection fraction visually estimated at >75 %. There are no regional wall motion abnormalities seen.   2. Normal left ventricular diastolic function, with normal left ventricular filling pressure.   3. Normal right ventricular cavity size, with normal wall thickness, and normal right ventricular systolic function.   4. Normal left and right atrial size.   5. No significant valvular disease.   6. Small pericardial effusion noted adjacent to the right ventricle with no echocardiographic evidence of tamponade physiology.    ________________________________________________________________________________________  FINDINGS:     Left Ventricle:  The left ventricular cavity is small. Left ventricular wall thickness is normal. Left ventricular systolic function is hyperdynamic with an ejection fraction visually estimated at >75%. There are no regional wall motion abnormalities seen. There is normal left ventricular diastolic function, with normal left ventricular filling pressure.     Right Ventricle:  The right ventricular cavity is normal in size, with normal wall thickness and right ventricular systolic function is normal. Tricuspid annular plane systolic excursion (TAPSE) is 2.2 cm (normal >=1.7 cm).     Left Atrium:  The left atrium is normal in size with an indexed volume of 20.98 ml/m².     Right Atrium:  The right atrium is normal in size with an indexed volume of 6.95 ml/m².     Interatrial Septum:  The interatrial septum appears intact.     Aortic Valve:  The aortic valve appears trileaflet with normal systolic excursion. There is no aortic valve stenosis. There is trace aortic regurgitation.     Mitral Valve:  Structurally normal mitral valve with normal leaflet excursion. There is mild calcification of the mitral valve annulus. There is no mitral valve stenosis. There is no evidence of mitral regurgitation.     Tricuspid Valve:  Structurally normal tricuspid valve with normal leaflet excursion. There is trace tricuspid regurgitation. Estimated pulmonary artery systolic pressure is 27 mmHg.     Pulmonic Valve:  Structurally normal pulmonic valve with normal leaflet excursion. There is no evidence of pulmonic regurgitation.     Aorta:  The aortic root appears normal in size. The aortic root at the sinuses of Valsalva is normal in size, measuring 2.80 cm (indexed 1.85 cm/m²). The ascending aorta is normal in size, measuring 3.20 cm (indexed 2.12 cm/m²).     Pericardium:  There is a small pericardial effusion noted adjacent to the right ventricle with no echocardiographic evidence of tamponade physiology.     Systemic Veins:  The inferior vena cava is normal in size (normal <2.1cm) with normal inspiratory collapse (normal >50%) consistent with normal right atrial pressure (~3, range 0-5mmHg).  ____________________________________________________________________  QUANTITATIVE DATA:  Left Ventricle Measurements: (Indexed to BSA)     IVSd (2D):   1.1 cm  LVPWd (2D):  0.8 cm  LVIDd (2D):  3.7 cm  LVIDs (2D):  2.4 cm  LV Mass:     105 g  69.7 g/m²  Visualized LV EF%: >75%     MV E Vmax:    0.58 m/s  MV A Vmax:    0.56 m/s  MV E/A:       1.04  e' lateral:   8.92 cm/s  e' medial:    5.98 cm/s  E/e' lateral: 6.49  E/e' medial:  9.68  E/e' Average: 7.77    Aorta Measurements: (Normal range) (Indexed to BSA)     Ao Root d     2.80 cm (2.7 - 3.3 cm) 1.85 cm/m²  Ao Asc d, 2D: 3.20  Ao Asc prox:  3.20 cm                2.12 cm/m²            Left Atrium Measurements: (Indexed to BSA)  LA Diam 2D:        2.80 cm  LA Vol s, MOD A4C: 26.30 ml.  LA Vol s, MOD A2C: 38.10 ml.  LA Vol s, MOD BP:  31.70 ml  20.98 ml/m²         Right Ventricle Measurements: Right Atrial Measurements:     TAPSE: 2.2 cm                 RA Vol:            10.50 ml                                RA Vol s, MOD A4C  15.9 ml                                RA Vol Index:      6.95 ml/m²                                RA Area s, MOD A4C 9.5 cm²       LVOT / RVOT/ Qp/Qs Data: (Indexed to BSA)  LVOT Diameter,s: 1.70 cm  LVOT Area:       2.27 cm²    Mitral Valve Measurements:     MV E Vmax: 0.6 m/s  MV A Vmax: 0.6 m/s  MV E/A:    1.0       Tricuspid Valve Measurements:     TR Vmax:          2.4 m/s  TR Peak Gradient: 23.6 mmHg  RA Pressure:      3 mmHg  PASP:             27 mmHg    ___________________________________________    < end of copied text >

## 2025-04-17 NOTE — PROGRESS NOTE ADULT - SUBJECTIVE AND OBJECTIVE BOX
DATE OF SERVICE: 04-17-25 @ 15:15    Patient is a 64y old  Female who presents with a chief complaint of Palpitations (17 Apr 2025 14:56)      INTERVAL HISTORY: in no acute distress    REVIEW OF SYSTEMS:  CONSTITUTIONAL: No weakness  EYES/ENT: No visual changes;  No throat pain   NECK: No pain or stiffness  RESPIRATORY: No cough, wheezing; No shortness of breath  CARDIOVASCULAR: No chest pain or palpitations  GASTROINTESTINAL: No abdominal  pain. No nausea, vomiting, or hematemesis  GENITOURINARY: No dysuria, frequency or hematuria  NEUROLOGICAL: No stroke like symptoms  SKIN: No rashes    TELEMETRY Personally reviewed: SR 90s  	  MEDICATIONS:  flecainide 50 milliGRAM(s) Oral every 12 hours  metoprolol succinate ER 50 milliGRAM(s) Oral daily        PHYSICAL EXAM:  T(C): 37.2 (04-17-25 @ 12:00), Max: 37.3 (04-17-25 @ 04:32)  HR: 88 (04-17-25 @ 12:00) (83 - 91)  BP: 125/78 (04-17-25 @ 12:00) (117/77 - 143/76)  RR: 18 (04-17-25 @ 12:00) (18 - 20)  SpO2: 95% (04-17-25 @ 12:00) (91% - 96%)  Wt(kg): --  I&O's Summary    16 Apr 2025 07:01  -  17 Apr 2025 07:00  --------------------------------------------------------  IN: 240 mL / OUT: 750 mL / NET: -510 mL          Appearance: In no distress	  HEENT:    PERRL, EOMI	  Cardiovascular:  S1 S2, No JVD  Respiratory: Lungs clear to auscultation	  Gastrointestinal:  Soft, Non-tender, + BS	  Vascularature:  No edema of LE  Psychiatric: Appropriate affect   Neuro: no acute focal deficits                               7.4    12.84 )-----------( 350      ( 17 Apr 2025 07:17 )             24.4     04-17    133[L]  |  100  |  8   ----------------------------<  53[LL]  4.3   |  19[L]  |  0.55    Ca    8.1[L]      17 Apr 2025 07:17  Phos  3.2     04-17  Mg     1.9     04-17    TPro  6.1  /  Alb  2.2[L]  /  TBili  0.3  /  DBili  x   /  AST  32  /  ALT  21  /  AlkPhos  80  04-17        Labs personally reviewed  < from: TTE Limited W or WO Ultrasound Enhancing Agent (04.17.25 @ 10:16) >     CONCLUSIONS:      1. Left ventricular systolic function is normal.   2. Small to moderate pericardial effusion noted adjacent to the posterolateral left ventricle with no echocardiographic evidence of tamponade physiology.   3. Thickened pericardium.   4. Bilateral pleural effusion noted.   5. Compared to the transthoracic echocardiogram performed on 3/11/2025, there have been no significant interval changes.    < from: Nuclear Stress Test-Pharmacologic.. (04.14.25 @ 14:51) >  Conclusions:   1. Normal myocardial perfusion scan, with no evidence of infarction or inducible ischemia.   2. Qualitative Perfusion:      - small-sized, mild defect(s) in the distal inferolateral wall that is fixed suggestive of attenuation artifact.   3. Perfusion Findings: Fixed defect with normal motion, likely attenuation artifact.   4. The left ventricle is normal in function and small in size. The post stress left ventricular EF is 84 %. The stress end diastolic volume is 35 ml and systolic volume is 6 ml.    ASSESSMENT/PLAN: 	    64F with hx HTN, MAURICIO previously on IV iron, recent admission for odynophagia/dysphagia, elevated inflammatory markers presents with palpitations and chest pain.    1. Tachyarrhythmia - with SVT alternating with Afl  - CHADSVASc of 1 for female (states not on any BP meds x 1month as BP low)   - Hold off AC for now  - EP consulted for rythym control strategy eval   - Per EP - ECGs consistent with SVT with a pathway  - Recent NST (4/14/25) normal myocardial perfusion  - 4/16 EP started pt on Metoprolol 50mg QD and Flecainaide 50mg q12 hrs  - Continue telemetry monitor  - Treatment of infectious process as per primary team    2. Chest pain  - Nuclear stress test 4/14 with no evidence of infarction/ischemia  - TTE 4/17/25 - preserved EF, small to mod pericardial effusion, with no evidence of tamponade, thickened pericardium, b/l pleural effusion (compared to small effusion 3/11/25)    3. Systemic inflammatory response syndrome (SIRS).   ·  Plan: Pt with leukocytosis and febrile episode 101 in ED  - Viral panel negative, UA negative for UTI, blood cx NGTD  - CT chest negative for pna   - LE dopplers negative for DVT   - ID following, less likely infectious suspect autoimmune. Monitor off abx.   - Rheumatology consulted      HARJINDER Aragon,  [facility]  Cardiovascular Medicine  [address], [address]  Available through call or text on [employer] TE[employer]  Office: [phone]     DATE OF SERVICE: 04-17-25 @ 15:15    Patient is a 64y old  Female who presents with a chief complaint of Palpitations (17 Apr 2025 14:56)      INTERVAL HISTORY: in no acute distress, but complaining of chest discomfort    REVIEW OF SYSTEMS:  CONSTITUTIONAL: No weakness  EYES/ENT: No visual changes;  No throat pain   NECK: No pain or stiffness  RESPIRATORY: No cough, wheezing; No shortness of breath  CARDIOVASCULAR: No chest pain or palpitations  GASTROINTESTINAL: No abdominal  pain. No nausea, vomiting, or hematemesis  GENITOURINARY: No dysuria, frequency or hematuria  NEUROLOGICAL: No stroke like symptoms  SKIN: No rashes    TELEMETRY Personally reviewed: SR 90s  	  MEDICATIONS:  flecainide 50 milliGRAM(s) Oral every 12 hours  metoprolol succinate ER 50 milliGRAM(s) Oral daily        PHYSICAL EXAM:  T(C): 37.2 (04-17-25 @ 12:00), Max: 37.3 (04-17-25 @ 04:32)  HR: 88 (04-17-25 @ 12:00) (83 - 91)  BP: 125/78 (04-17-25 @ 12:00) (117/77 - 143/76)  RR: 18 (04-17-25 @ 12:00) (18 - 20)  SpO2: 95% (04-17-25 @ 12:00) (91% - 96%)  Wt(kg): --  I&O's Summary    16 Apr 2025 07:01  -  17 Apr 2025 07:00  --------------------------------------------------------  IN: 240 mL / OUT: 750 mL / NET: -510 mL          Appearance: In no distress	  HEENT:    PERRL, EOMI	  Cardiovascular:  S1 S2, No JVD  Respiratory: Lungs clear to auscultation	  Gastrointestinal:  Soft, Non-tender, + BS	  Vascularature:  No edema of LE  Psychiatric: Appropriate affect   Neuro: no acute focal deficits                               7.4    12.84 )-----------( 350      ( 17 Apr 2025 07:17 )             24.4     04-17    133[L]  |  100  |  8   ----------------------------<  53[LL]  4.3   |  19[L]  |  0.55    Ca    8.1[L]      17 Apr 2025 07:17  Phos  3.2     04-17  Mg     1.9     04-17    TPro  6.1  /  Alb  2.2[L]  /  TBili  0.3  /  DBili  x   /  AST  32  /  ALT  21  /  AlkPhos  80  04-17        Labs personally reviewed  < from: TTE Limited W or WO Ultrasound Enhancing Agent (04.17.25 @ 10:16) >     CONCLUSIONS:      1. Left ventricular systolic function is normal.   2. Small to moderate pericardial effusion noted adjacent to the posterolateral left ventricle with no echocardiographic evidence of tamponade physiology.   3. Thickened pericardium.   4. Bilateral pleural effusion noted.   5. Compared to the transthoracic echocardiogram performed on 3/11/2025, there have been no significant interval changes.    < from: Nuclear Stress Test-Pharmacologic.. (04.14.25 @ 14:51) >  Conclusions:   1. Normal myocardial perfusion scan, with no evidence of infarction or inducible ischemia.   2. Qualitative Perfusion:      - small-sized, mild defect(s) in the distal inferolateral wall that is fixed suggestive of attenuation artifact.   3. Perfusion Findings: Fixed defect with normal motion, likely attenuation artifact.   4. The left ventricle is normal in function and small in size. The post stress left ventricular EF is 84 %. The stress end diastolic volume is 35 ml and systolic volume is 6 ml.    ASSESSMENT/PLAN: 	    64F with hx HTN, MAURICIO previously on IV iron, recent admission for odynophagia/dysphagia, elevated inflammatory markers presents with palpitations and chest pain.    1. Tachyarrhythmia - with SVT alternating with Afl  - CHADSVASc of 1 for female (states not on any BP meds x 1month as BP low)   - Hold off AC for now  - EP consulted for rythym control strategy eval   - Per EP - ECGs consistent with SVT with a pathway  - Recent NST (4/14/25) normal myocardial perfusion  - 4/16 EP started pt on Metoprolol 50mg QD and Flecainaide 50mg q12 hrs; pt has been in SR since then  - Continue tele monitoring     2. Chest pain  - Nuclear stress test 4/14 with no evidence of infarction/ischemia  - TTE 4/17/25 - preserved EF, small to mod pericardial effusion, with no evidence of tamponade, thickened pericardium, b/l pleural effusion (compared to small effusion 3/11/25)  - ProBNP elevated 2944 (compared to 318 last month)     3. Systemic inflammatory response syndrome (SIRS).   ·  Plan: Pt with leukocytosis and febrile episode 101 in ED  - Viral panel negative, UA negative for UTI, blood cx NGTD  - CT chest negative for pna   - LE dopplers negative for DVT   - ID following, less likely infectious suspect autoimmune. Monitor off abx.   - Rheumatology consulted      HARJINDER Aragon DO [facility]  Cardiovascular Medicine  [address], [address]  Available through call or text on [employer] TE[employer]  Office: [phone]     DATE OF SERVICE: 04-17-25 @ 15:15    Patient is a 64y old  Female who presents with a chief complaint of Palpitations (17 Apr 2025 14:56)      INTERVAL HISTORY: in no acute distress, but complaining of chest discomfort    REVIEW OF SYSTEMS:  CONSTITUTIONAL: No weakness  EYES/ENT: No visual changes;  No throat pain   NECK: No pain or stiffness  RESPIRATORY: No cough, wheezing; No shortness of breath  CARDIOVASCULAR: No chest pain or palpitations  GASTROINTESTINAL: No abdominal  pain. No nausea, vomiting, or hematemesis  GENITOURINARY: No dysuria, frequency or hematuria  NEUROLOGICAL: No stroke like symptoms  SKIN: No rashes    TELEMETRY Personally reviewed: SR 90s  	  MEDICATIONS:  flecainide 50 milliGRAM(s) Oral every 12 hours  metoprolol succinate ER 50 milliGRAM(s) Oral daily        PHYSICAL EXAM:  T(C): 37.2 (04-17-25 @ 12:00), Max: 37.3 (04-17-25 @ 04:32)  HR: 88 (04-17-25 @ 12:00) (83 - 91)  BP: 125/78 (04-17-25 @ 12:00) (117/77 - 143/76)  RR: 18 (04-17-25 @ 12:00) (18 - 20)  SpO2: 95% (04-17-25 @ 12:00) (91% - 96%)  Wt(kg): --  I&O's Summary    16 Apr 2025 07:01  -  17 Apr 2025 07:00  --------------------------------------------------------  IN: 240 mL / OUT: 750 mL / NET: -510 mL          Appearance: In no distress	  HEENT:    PERRL, EOMI	  Cardiovascular:  S1 S2, No JVD  Respiratory: Lungs clear to auscultation	  Gastrointestinal:  Soft, Non-tender, + BS	  Vascularature:  No edema of LE  Psychiatric: Appropriate affect   Neuro: no acute focal deficits                               7.4    12.84 )-----------( 350      ( 17 Apr 2025 07:17 )             24.4     04-17    133[L]  |  100  |  8   ----------------------------<  53[LL]  4.3   |  19[L]  |  0.55    Ca    8.1[L]      17 Apr 2025 07:17  Phos  3.2     04-17  Mg     1.9     04-17    TPro  6.1  /  Alb  2.2[L]  /  TBili  0.3  /  DBili  x   /  AST  32  /  ALT  21  /  AlkPhos  80  04-17        Labs personally reviewed  < from: TTE Limited W or WO Ultrasound Enhancing Agent (04.17.25 @ 10:16) >     CONCLUSIONS:      1. Left ventricular systolic function is normal.   2. Small to moderate pericardial effusion noted adjacent to the posterolateral left ventricle with no echocardiographic evidence of tamponade physiology.   3. Thickened pericardium.   4. Bilateral pleural effusion noted.   5. Compared to the transthoracic echocardiogram performed on 3/11/2025, there have been no significant interval changes.    < from: Nuclear Stress Test-Pharmacologic.. (04.14.25 @ 14:51) >  Conclusions:   1. Normal myocardial perfusion scan, with no evidence of infarction or inducible ischemia.   2. Qualitative Perfusion:      - small-sized, mild defect(s) in the distal inferolateral wall that is fixed suggestive of attenuation artifact.   3. Perfusion Findings: Fixed defect with normal motion, likely attenuation artifact.   4. The left ventricle is normal in function and small in size. The post stress left ventricular EF is 84 %. The stress end diastolic volume is 35 ml and systolic volume is 6 ml.    ASSESSMENT/PLAN: 	    64F with hx HTN, MAURICIO previously on IV iron, recent admission for odynophagia/dysphagia, elevated inflammatory markers presents with palpitations and chest pain.    1. Tachyarrhythmia - with SVT alternating with Afl  - CHADSVASc of 1 for female (states not on any BP meds x 1month as BP low)   - Hold off AC for now  - EP consulted for rythym control strategy eval   - Per EP - ECGs consistent with SVT with a pathway  - Recent NST (4/14/25) normal myocardial perfusion  - 4/16 EP started pt on Metoprolol 50mg QD and Flecainaide 50mg q12 hrs; pt has been in SR since then  - Continue tele monitoring     2. Chest pain  - Nuclear stress test 4/14 with no evidence of infarction/ischemia  - TTE 4/17/25 - preserved EF, small to mod pericardial effusion, with no evidence of tamponade, thickened pericardium, b/l pleural effusion (compared to small effusion 3/11/25)  - ProBNP elevated 2944 (compared to 318 last month)   - 4/17 started Colchicine 0.6mg BID for possible pericarditis   - Repeat echo in 48 hours to assess pericardial effusion and thickening     3. Systemic inflammatory response syndrome (SIRS).   ·  Plan: Pt with leukocytosis and febrile episode 101 in ED  - Viral panel negative, UA negative for UTI, blood cx NGTD  - CT chest negative for pna   - LE dopplers negative for DVT   - ID following, less likely infectious suspect autoimmune. Monitor off abx.   - Rheumatology consulted      HARJINDER Aragon, [facility]  Cardiovascular Medicine  [address] [place] [address], [address]  Available through call or text on [employer] TE[employer]  Office: [phone]     DATE OF SERVICE: 04-17-25 @ 15:15    Patient is a 64y old  Female who presents with a chief complaint of Palpitations (17 Apr 2025 14:56)      INTERVAL HISTORY: in no acute distress, but complaining of chest discomfort    REVIEW OF SYSTEMS:  CONSTITUTIONAL: No weakness  EYES/ENT: No visual changes;  No throat pain   NECK: No pain or stiffness  RESPIRATORY: No cough, wheezing; No shortness of breath  CARDIOVASCULAR: + pleuritic chest pain, no palpitations  GASTROINTESTINAL: No abdominal  pain. No nausea, vomiting, or hematemesis  GENITOURINARY: No dysuria, frequency or hematuria  NEUROLOGICAL: No stroke like symptoms  SKIN: No rashes    TELEMETRY Personally reviewed: SR 90s  	  MEDICATIONS:  flecainide 50 milliGRAM(s) Oral every 12 hours  metoprolol succinate ER 50 milliGRAM(s) Oral daily        PHYSICAL EXAM:  T(C): 37.2 (04-17-25 @ 12:00), Max: 37.3 (04-17-25 @ 04:32)  HR: 88 (04-17-25 @ 12:00) (83 - 91)  BP: 125/78 (04-17-25 @ 12:00) (117/77 - 143/76)  RR: 18 (04-17-25 @ 12:00) (18 - 20)  SpO2: 95% (04-17-25 @ 12:00) (91% - 96%)  Wt(kg): --  I&O's Summary    16 Apr 2025 07:01  -  17 Apr 2025 07:00  --------------------------------------------------------  IN: 240 mL / OUT: 750 mL / NET: -510 mL          Appearance: In no distress	  HEENT:    PERRL, EOMI	  Cardiovascular:  S1 S2, No JVD  Respiratory: Lungs clear to auscultation	  Gastrointestinal:  Soft, Non-tender, + BS	  Vascularature:  No edema of LE  Psychiatric: Appropriate affect   Neuro: no acute focal deficits                               7.4    12.84 )-----------( 350      ( 17 Apr 2025 07:17 )             24.4     04-17    133[L]  |  100  |  8   ----------------------------<  53[LL]  4.3   |  19[L]  |  0.55    Ca    8.1[L]      17 Apr 2025 07:17  Phos  3.2     04-17  Mg     1.9     04-17    TPro  6.1  /  Alb  2.2[L]  /  TBili  0.3  /  DBili  x   /  AST  32  /  ALT  21  /  AlkPhos  80  04-17        Labs personally reviewed  < from: TTE Limited W or WO Ultrasound Enhancing Agent (04.17.25 @ 10:16) >     CONCLUSIONS:      1. Left ventricular systolic function is normal.   2. Small to moderate pericardial effusion noted adjacent to the posterolateral left ventricle with no echocardiographic evidence of tamponade physiology.   3. Thickened pericardium.   4. Bilateral pleural effusion noted.   5. Compared to the transthoracic echocardiogram performed on 3/11/2025, there have been no significant interval changes.    < from: Nuclear Stress Test-Pharmacologic.. (04.14.25 @ 14:51) >  Conclusions:   1. Normal myocardial perfusion scan, with no evidence of infarction or inducible ischemia.   2. Qualitative Perfusion:      - small-sized, mild defect(s) in the distal inferolateral wall that is fixed suggestive of attenuation artifact.   3. Perfusion Findings: Fixed defect with normal motion, likely attenuation artifact.   4. The left ventricle is normal in function and small in size. The post stress left ventricular EF is 84 %. The stress end diastolic volume is 35 ml and systolic volume is 6 ml.            ASSESSMENT/PLAN: 	    64F with hx HTN, MAURICIO previously on IV iron, recent admission for odynophagia/dysphagia, elevated inflammatory markers presents with palpitations and chest pain.    1. Tachyarrhythmia - pSVT  - EP consulted for rythym control strategy eval   - Per EP - ECGs consistent with SVT with a pathway  - 4/16 EP started pt on Metoprolol 50mg QD and Flecainaide 50mg q12 hrs; pt has been in SR since then  - Continue tele monitoring     2. Chest pain  - Recent NST (4/14/25) normal myocardial perfusion  - TTE 4/17/25 - preserved EF, small to mod pericardial effusion, with no evidence of tamponade, thickened pericardium, b/l pleural effusion (compared to small effusion 3/11/25)  - ProBNP elevated 2944 (compared to 318 last month)   - 4/17 start Colchicine 0.6mg BID for likely pericarditis with TTE findings and pleuritic chest pain  - Repeat echo in 48 hours to assess for progression of pericardial effusion      3. Systemic inflammatory response syndrome (SIRS).   ·  Plan: Pt with leukocytosis and febrile episode 101 in ED  - Viral panel negative, UA negative for UTI, blood cx NGTD  - CT chest negative for pna   - LE dopplers negative for DVT   - ID following, less likely infectious suspect autoimmune. Monitor off abx.   - Rheumatology consulted        HARJINDER Aragon DO [facility]  Cardiovascular Medicine  [address] [facility], [address]  Available through call or text on [employer]  Office: [phone]     DATE OF SERVICE: 04-17-25 @ 15:15    Patient is a 64y old  Female who presents with a chief complaint of Palpitations (17 Apr 2025 14:56)      INTERVAL HISTORY: in no acute distress, but complaining of chest discomfort    REVIEW OF SYSTEMS:  CONSTITUTIONAL: No weakness  EYES/ENT: No visual changes;  No throat pain   NECK: No pain or stiffness  RESPIRATORY: No cough, wheezing; No shortness of breath  CARDIOVASCULAR: + pleuritic chest pain, no palpitations  GASTROINTESTINAL: No abdominal  pain. No nausea, vomiting, or hematemesis  GENITOURINARY: No dysuria, frequency or hematuria  NEUROLOGICAL: No stroke like symptoms  SKIN: No rashes    TELEMETRY Personally reviewed: SR 90s  	  MEDICATIONS:  flecainide 50 milliGRAM(s) Oral every 12 hours  metoprolol succinate ER 50 milliGRAM(s) Oral daily        PHYSICAL EXAM:  T(C): 37.2 (04-17-25 @ 12:00), Max: 37.3 (04-17-25 @ 04:32)  HR: 88 (04-17-25 @ 12:00) (83 - 91)  BP: 125/78 (04-17-25 @ 12:00) (117/77 - 143/76)  RR: 18 (04-17-25 @ 12:00) (18 - 20)  SpO2: 95% (04-17-25 @ 12:00) (91% - 96%)  Wt(kg): --  I&O's Summary    16 Apr 2025 07:01  -  17 Apr 2025 07:00  --------------------------------------------------------  IN: 240 mL / OUT: 750 mL / NET: -510 mL          Appearance: In no distress	  HEENT:    PERRL, EOMI	  Cardiovascular:  S1 S2, No JVD  Respiratory: Lungs clear to auscultation	  Gastrointestinal:  Soft, Non-tender, + BS	  Vascularature:  No edema of LE  Psychiatric: Appropriate affect   Neuro: no acute focal deficits                               7.4    12.84 )-----------( 350      ( 17 Apr 2025 07:17 )             24.4     04-17    133[L]  |  100  |  8   ----------------------------<  53[LL]  4.3   |  19[L]  |  0.55    Ca    8.1[L]      17 Apr 2025 07:17  Phos  3.2     04-17  Mg     1.9     04-17    TPro  6.1  /  Alb  2.2[L]  /  TBili  0.3  /  DBili  x   /  AST  32  /  ALT  21  /  AlkPhos  80  04-17        Labs personally reviewed  < from: TTE Limited W or WO Ultrasound Enhancing Agent (04.17.25 @ 10:16) >     CONCLUSIONS:      1. Left ventricular systolic function is normal.   2. Small to moderate pericardial effusion noted adjacent to the posterolateral left ventricle with no echocardiographic evidence of tamponade physiology.   3. Thickened pericardium.   4. Bilateral pleural effusion noted.   5. Compared to the transthoracic echocardiogram performed on 3/11/2025, there have been no significant interval changes.    < from: Nuclear Stress Test-Pharmacologic.. (04.14.25 @ 14:51) >  Conclusions:   1. Normal myocardial perfusion scan, with no evidence of infarction or inducible ischemia.   2. Qualitative Perfusion:      - small-sized, mild defect(s) in the distal inferolateral wall that is fixed suggestive of attenuation artifact.   3. Perfusion Findings: Fixed defect with normal motion, likely attenuation artifact.   4. The left ventricle is normal in function and small in size. The post stress left ventricular EF is 84 %. The stress end diastolic volume is 35 ml and systolic volume is 6 ml.            ASSESSMENT/PLAN: 	    64F with hx HTN, MAURICIO previously on IV iron, recent admission for odynophagia/dysphagia, elevated inflammatory markers presents with palpitations and chest pain.    1. Tachyarrhythmia - pSVT  - EP consulted for rythym control strategy eval   - Per EP - ECGs consistent with SVT with a pathway  - ?precipitated by viral pericarditis   - 4/16 EP started pt on Metoprolol 50mg QD and Flecainaide 50mg q12 hrs; pt has been in SR since then  - Continue tele monitoring     2. Chest pain  - Recent NST (4/14/25) normal myocardial perfusion  - TTE 4/17/25 - preserved EF, small to mod pericardial effusion, with no evidence of tamponade, thickened pericardium, b/l pleural effusion (compared to small effusion 3/11/25)  - ProBNP elevated 2944 (compared to 318 last month)   - 4/17 start Colchicine 0.6mg BID for likely pericarditis with TTE findings and pleuritic chest pain  - Repeat echo in 48 hours to assess for progression of pericardial effusion      3. Systemic inflammatory response syndrome (SIRS).   ·  Plan: Pt with leukocytosis and febrile episode 101 in ED  - Viral panel negative, UA negative for UTI, blood cx NGTD  - CT chest negative for pna   - LE dopplers negative for DVT   - ID following, less likely infectious suspect autoimmune. Monitor off abx.   - Rheumatology consulted        HARJINDER Aragon DO [facility]  Cardiovascular Medicine  [address] [facility], [address]  Available through call or text on [employer] TE[employer]  Office: [phone]

## 2025-04-17 NOTE — PHYSICAL THERAPY INITIAL EVALUATION ADULT - PERTINENT HX OF CURRENT PROBLEM, REHAB EVAL
64F estate planning  hx HTN, MAURICIO previously on IV iron, recent admission for odynophagia/dysphagia, elevated inflammatory markers presents with palpitations and chest pain. CT Chest No pulmonary embolism within the confines of this exam. Small bilateral pleural effusions and bibasilar atelectasis.Cardiomegaly. Moderate sized pericardial effusion compared with prior exam from 3/7/2025. Doppler negative for DVT. CT A/P No acute intra-abdominal findings.

## 2025-04-17 NOTE — PHYSICAL THERAPY INITIAL EVALUATION ADULT - ADDITIONAL COMMENTS
Pt lives in a private home alone, 10 steps to enter, 1 flight of stairs to bedroom. Pt performed ADL/IADLs with assist from sister who lives in [place], but is only able to assist her occasionally. Requires assist for getting out of bed, cooking. Ambulates with rolling walker. Owns DME: rolling walker.

## 2025-04-17 NOTE — PROGRESS NOTE ADULT - SUBJECTIVE AND OBJECTIVE BOX
infectious diseases progress note:    Patient is a 64y old  Female who presents with a chief complaint of SVT (16 Apr 2025 13:12)        Palpitations               Allergies    NSAIDs (Hives)    Intolerances        ANTIBIOTICS/RELEVANT:  antimicrobials    immunologic:    OTHER:  acetaminophen     Tablet .. 650 milliGRAM(s) Oral every 6 hours PRN  dextrose 5%. 1000 milliLiter(s) IV Continuous <Continuous>  dextrose 5%. 1000 milliLiter(s) IV Continuous <Continuous>  dextrose 50% Injectable 25 Gram(s) IV Push once  dextrose 50% Injectable 12.5 Gram(s) IV Push once  dextrose 50% Injectable 25 Gram(s) IV Push once  dextrose Oral Gel 15 Gram(s) Oral once  flecainide 50 milliGRAM(s) Oral every 12 hours  gabapentin 200 milliGRAM(s) Oral at bedtime  glucagon  Injectable 1 milliGRAM(s) IntraMuscular once  heparin   Injectable 5000 Unit(s) SubCutaneous every 8 hours  metoprolol succinate ER 50 milliGRAM(s) Oral daily      Objective:  Vital Signs Last 24 Hrs  T(C): 37.3 (17 Apr 2025 04:32), Max: 37.3 (17 Apr 2025 04:32)  T(F): 99.1 (17 Apr 2025 04:32), Max: 99.1 (17 Apr 2025 04:32)  HR: 90 (17 Apr 2025 04:32) (83 - 98)  BP: 143/76 (17 Apr 2025 04:32) (104/58 - 143/76)  BP(mean): 83 (16 Apr 2025 13:50) (77 - 83)  RR: 18 (17 Apr 2025 04:32) (18 - 20)  SpO2: 92% (17 Apr 2025 04:32) (91% - 96%)    Parameters below as of 17 Apr 2025 04:32  Patient On (Oxygen Delivery Method): room air         o sinus tenderness on percussion	  Neck:no JVD, no lymphadenopathy, supple  Respiratory: CTA rula  Cardiovascular: S1S2 RRR, no murmurs  Gastrointestinal:soft, (+) BS, no HSM  Extremities:no e/e/c        LABS:                        7.4    12.84 )-----------( 350      ( 17 Apr 2025 07:17 )             24.4     04-17    133[L]  |  100  |  8   ----------------------------<  53[LL]  4.3   |  19[L]  |  0.55    Ca    8.1[L]      17 Apr 2025 07:17  Phos  3.2     04-17  Mg     1.9     04-17    TPro  6.1  /  Alb  2.2[L]  /  TBili  0.3  /  DBili  x   /  AST  32  /  ALT  21  /  AlkPhos  80  04-17    PT/INR - ( 16 Apr 2025 04:53 )   PT: 13.9 sec;   INR: 1.21 ratio         PTT - ( 16 Apr 2025 04:53 )  PTT:27.2 sec  Urinalysis Basic - ( 17 Apr 2025 07:17 )    Color: x / Appearance: x / SG: x / pH: x  Gluc: 53 mg/dL / Ketone: x  / Bili: x / Urobili: x   Blood: x / Protein: x / Nitrite: x   Leuk Esterase: x / RBC: x / WBC x   Sq Epi: x / Non Sq Epi: x / Bacteria: x          MICROBIOLOGY:    RECENT CULTURES:        RESPIRATORY CULTURES:              RADIOLOGY & ADDITIONAL STUDIES:        Pager [phone]  After 5 pm/weekends or if no response :[phone]

## 2025-04-17 NOTE — PATIENT PROFILE ADULT - FALL HARM RISK - HARM RISK INTERVENTIONS

## 2025-04-17 NOTE — PROGRESS NOTE ADULT - ASSESSMENT
65 yo F with PMH of HTN, MAURICIO, and recent pneumonia presenting for odynophagia, dysphagia, dyspnea, and weakness for 3  months, found to have elevated CRP, microcytic anemia, and positive NORMA c/f systemic process.     Impression:  #Odynophagia, Dysphagia  #Iron Deficiency Anemia  #Myalgias dyspnea, weakness  #Positive inflammatory markers    P/w systemic symptoms for 2-3 months with dyspnea, tachycardia, myalgias and weakness. She endorses dysphagia to solid>liquids and odynophagia with any swallowing even saliva for the same time period. On admission, pt found to have low grade temp to 100.2 and mild tachycardia with wbc 14.2. Labs notable for Hgb 8 (prior baseline 10.4 1/2025), MCV 73.2, , proteinuria, and albumin 2.9.  Workup at OSH notable for NORMA 1:1280. Barium esophagram at [facility] on 3/5 showed a few tertiary contractions commiserate with age and no stricture or mucosal abnormality. 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 Rheumatological disorders, such as scleroderma, Sjogren's syndrome, systemic lupus erythematosus, rheumatoid arthritis, sarcoidosis, Behcet's disease, anti-neutrophil cytoplasmic antibody (ANCA)-associated vasculitis, or granulomatosis with polyangiitis, have been associated with dysphagia. Dermatomyositis and polymyositis also on ddx. Structural cause such as adenocarcinoma also possible though systemic cause more likely given pt's symptoms. Thyroid disorder also on ddx. 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 Her last EGD/colonoscopy was 2011 which showed gastritis and hemorrhoids but she has not had repeat evaluation since she was found to have MAURICIO.    S/P EGD 3/10, normal appearing esophagus and gastritis, biopsied.   was discharged but states she has gotten worse since March with weakness, edema, dysphagia, cp, gage.  noted to have an elevated wbc  no eosinophil  non toxic  febrile in er but not since    Dysphagia  Chest pain  with elevated trop   edema  Elevated NORMA and inflammatory markers  improvement with steroids in the past     no unifying diagnosis  ( occult malignancy, rheumatologic disease more likely than infection)   would hold antibiotics unless she develops signs of sepsis   await cultures   doubt infection explains her weakness

## 2025-04-17 NOTE — CONSULT NOTE ADULT - ASSESSMENT
64F PMH HTN, MAURICIO with symptoms of palpitations, odynophagia, dysphagia, myalgias, weight loss. Rheumatology consulted to jamil for autoimmune disease    #Ro52 positive  #pericardial and pleural effusion  #odynophagia/dysphagia with myalgias  #anemia  -Pt with several months of odynophagia, dysphagia, then developed myalgias  -Currently with UE abductor weakness  -Ro52 positive to 156, Ro60 negative. Previously NORMA was positive 1:1280, repeat here negative, dsDNA, EBENEZER, C3, C4, RF, CCP, scl, centromere, RNApol 3, ANCA, Christin, APS serologies negative  -Previously CK and myoglobin was low, normal aldolase  -Has elevated ,  and ferritin 1179  -Pt also has anemia, pt has hx of anemia from menorrhagia but s/p hysterectomy for fibroids in 2009. Pt had mammo and pap last yr. Colonoscopy 2011  -Pt previously had barium esophogram, which showed few contractions, no strictures. EGD showed gastritis, esophogus normal. Pending outpt esophageal manometry  -CT chest with small pleural effusions and moderate pericardial effusion    Recommendations:  -Please obtain sjogrens, spep, upep, immunofixation, mpo, pr3, quant, hep    Note incomplete  Kavita Barrios MD  Rheumatology Fellow 64F PMH HTN, MAURICIO with symptoms of palpitations, odynophagia, dysphagia, myalgias, weight loss. Rheumatology consulted to eval for autoimmune disease    #Ro52 positive  #pericardial and pleural effusion  #odynophagia with myalgias  #anemia  -Pt with several months of odynophagia, then developed severe myalgias  -Currently with UE abductor weakness and pain  -Ro52 positive to 156, Ro60 negative. Previously NORMA was positive 1:1280, repeat here negative, dsDNA, EBENEZER, C3, C4, RF, CCP, scl, centromere, RNApol 3, ANCA, Christin, APS serologies negative  -Previously CK and myoglobin was low, normal aldolase  -Has elevated ,  and ferritin 1179  -Pt also has anemia, pt has hx of anemia from menorrhagia but s/p hysterectomy for fibroids in 2009. Pt had mammo and pap last yr. Colonoscopy 2011 reportedly normal. Unclear cause of anemia, possibly mixed AOCD and iron def  -Pt previously had barium esophogram, which showed few contractions, no strictures. EGD showed gastritis, esophagus normal. Pending outpt esophageal manometry  -CT chest with small pleural effusions and moderate pericardial effusion    At this time, pt has Ro 52 positive and constellation of symptoms of odynophagia, severe myopathy of UE, with serositis. At this time, does not fit into a typical autoimmune condition, likely UCTD for now    Recommendations:  -Please obtain sjogrens, spep, upep, immunofixation, mpo, pr3, quant, hep, CK, ferritin, LDH, hapto, C3, C4  -Please obtain MR L spine with IV contrast to eval for discitis  -Colchicine per cardiology    Case discussed with Dr Yuan Barrios MD  Rheumatology Fellow 64F PMH HTN, MAURICIO with symptoms of palpitations, odynophagia, dysphagia, myalgias, weight loss. Rheumatology consulted to eval for autoimmune disease    # Proximal myalgia UE>> LE  # pericardial and pleural effusion  # odynophagia   # anemia  # Ro52 positive  # High inflammatory markers     -Pt with several months of odynophagia, then developed severe myalgias  -Currently with UE abductor weakness and pain  -Ro52 positive to 156, Ro60 negative. Previously NORMA was positive 1:1280, repeat here negative, dsDNA, EBENEZER, C3, C4, RF, CCP, scl, centromere, RNA terra 3, ANCA, Christin, APS serologies negative  -Previously CK and myoglobin was low, normal aldolase  -Has elevated ,  and ferritin 1179  -Pt also has anemia, pt has hx of anemia from menorrhagia but s/p hysterectomy for fibroids in 2009. Pt had mammo and pap last yr. Colonoscopy 2011 reportedly normal. Unclear cause of anemia, possibly mixed AOCD and iron def  -Pt previously had barium esophogram, which showed few contractions, no strictures. EGD showed gastritis, esophagus normal. Pending outpt esophageal manometry  -CT chest with small pleural effusions and moderate pericardial effusion    At this time, pt has Ro 52 positive and constellation of symptoms of odynophagia, severe myalgia of UE, with serositis. At this time, does not fit into a typical autoimmune condition, likely UCTD for now    Recommendations:  -Please obtain sjogrens, spep, upep, immunofixation, mpo, pr3, quant, hep, CK, ferritin, LDH, hapto, C3, C4  -Please obtain MR L spine with IV contrast to eval for discitis  -Colchicine per cardiology    Case discussed with Dr Yuan Barrios MD  Rheumatology Fellow

## 2025-04-17 NOTE — PROGRESS NOTE ADULT - SUBJECTIVE AND OBJECTIVE BOX
Cortney Holden DO  Division of Hospital Medicine  Available on MS Teams  PROGRESS NOTE:     Patient is a 64y old  Female who presents with a chief complaint of Palpitations (17 Apr 2025 14:26)      SUBJECTIVE / OVERNIGHT EVENTS:  Continues to complain of generalized body weakness as well as pain/difficulty swallowing. Denies fever, chills.     ADDITIONAL REVIEW OF SYSTEMS:  Negative unless specified above.     MEDICATIONS  (STANDING):  dextrose 5%. 1000 milliLiter(s) (50 mL/Hr) IV Continuous <Continuous>  dextrose 5%. 1000 milliLiter(s) (100 mL/Hr) IV Continuous <Continuous>  dextrose 50% Injectable 25 Gram(s) IV Push once  dextrose 50% Injectable 12.5 Gram(s) IV Push once  dextrose 50% Injectable 25 Gram(s) IV Push once  dextrose Oral Gel 15 Gram(s) Oral once  flecainide 50 milliGRAM(s) Oral every 12 hours  gabapentin 300 milliGRAM(s) Oral at bedtime  glucagon  Injectable 1 milliGRAM(s) IntraMuscular once  heparin   Injectable 5000 Unit(s) SubCutaneous every 8 hours  metoprolol succinate ER 50 milliGRAM(s) Oral daily    MEDICATIONS  (PRN):  acetaminophen     Tablet .. 650 milliGRAM(s) Oral every 6 hours PRN Temp greater or equal to 38C (100.4F), Mild Pain (1 - 3)      CAPILLARY BLOOD GLUCOSE      POCT Blood Glucose.: 76 mg/dL (17 Apr 2025 08:11)    I&O's Summary    16 Apr 2025 07:01  -  17 Apr 2025 07:00  --------------------------------------------------------  IN: 240 mL / OUT: 750 mL / NET: -510 mL        PHYSICAL EXAM:  Vital Signs Last 24 Hrs  T(C): 37.2 (17 Apr 2025 12:00), Max: 37.3 (17 Apr 2025 04:32)  T(F): 98.9 (17 Apr 2025 12:00), Max: 99.1 (17 Apr 2025 04:32)  HR: 88 (17 Apr 2025 12:00) (83 - 91)  BP: 125/78 (17 Apr 2025 12:00) (117/77 - 143/76)  BP(mean): --  RR: 18 (17 Apr 2025 12:00) (18 - 20)  SpO2: 95% (17 Apr 2025 12:00) (91% - 96%)    Parameters below as of 17 Apr 2025 12:00  Patient On (Oxygen Delivery Method): room air        CONSTITUTIONAL: NAD, chronically ill appearing  HEENT: Normocephalic, atraumatic  RESPIRATORY: Normal respiratory effort; lungs are clear to auscultation bilaterally  CARDIOVASCULAR: Regular rate and rhythm,  no murmur/rub/gallop  ABDOMEN: Nontender to palpation, normoactive bowel sounds  MUSCLOSKELETAL: trace b/l pedal edema, no cyanosis or clubbing  PSYCH: A+O x 3; affect appropriate  NEURO: No new FND    LABS:                        7.4    12.84 )-----------( 350      ( 17 Apr 2025 07:17 )             24.4     04-17    133[L]  |  100  |  8   ----------------------------<  53[LL]  4.3   |  19[L]  |  0.55    Ca    8.1[L]      17 Apr 2025 07:17  Phos  3.2     04-17  Mg     1.9     04-17    TPro  6.1  /  Alb  2.2[L]  /  TBili  0.3  /  DBili  x   /  AST  32  /  ALT  21  /  AlkPhos  80  04-17    PT/INR - ( 16 Apr 2025 04:53 )   PT: 13.9 sec;   INR: 1.21 ratio         PTT - ( 16 Apr 2025 04:53 )  PTT:27.2 sec      Urinalysis Basic - ( 17 Apr 2025 07:17 )    Color: x / Appearance: x / SG: x / pH: x  Gluc: 53 mg/dL / Ketone: x  / Bili: x / Urobili: x   Blood: x / Protein: x / Nitrite: x   Leuk Esterase: x / RBC: x / WBC x   Sq Epi: x / Non Sq Epi: x / Bacteria: x        Urinalysis with Rflx Culture (collected 16 Apr 2025 07:24)    Culture - Blood (collected 16 Apr 2025 04:50)  Source: Blood Blood-Peripheral  Preliminary Report (17 Apr 2025 11:01):    No growth at 24 hours    Culture - Blood (collected 16 Apr 2025 04:35)  Source: Blood Blood-Peripheral  Preliminary Report (17 Apr 2025 10:01):    No growth at 24 hours      Labs and imaging reviewed.

## 2025-04-18 DIAGNOSIS — M35.9 SYSTEMIC INVOLVEMENT OF CONNECTIVE TISSUE, UNSPECIFIED: ICD-10-CM

## 2025-04-18 DIAGNOSIS — I31.9 DISEASE OF PERICARDIUM, UNSPECIFIED: ICD-10-CM

## 2025-04-18 DIAGNOSIS — I31.39 OTHER PERICARDIAL EFFUSION (NONINFLAMMATORY): ICD-10-CM

## 2025-04-18 DIAGNOSIS — J90 PLEURAL EFFUSION, NOT ELSEWHERE CLASSIFIED: ICD-10-CM

## 2025-04-18 LAB
ANION GAP SERPL CALC-SCNC: 11 MMOL/L — SIGNIFICANT CHANGE UP (ref 5–17)
BUN SERPL-MCNC: 8 MG/DL — SIGNIFICANT CHANGE UP (ref 7–23)
C3 SERPL-MCNC: 118 MG/DL — SIGNIFICANT CHANGE UP (ref 81–157)
C4 SERPL-MCNC: 23 MG/DL — SIGNIFICANT CHANGE UP (ref 13–39)
CALCIUM SERPL-MCNC: 8.4 MG/DL — SIGNIFICANT CHANGE UP (ref 8.4–10.5)
CHLORIDE SERPL-SCNC: 98 MMOL/L — SIGNIFICANT CHANGE UP (ref 96–108)
CK SERPL-CCNC: 21 U/L — LOW (ref 25–170)
CO2 SERPL-SCNC: 24 MMOL/L — SIGNIFICANT CHANGE UP (ref 22–31)
CREAT SERPL-MCNC: 0.54 MG/DL — SIGNIFICANT CHANGE UP (ref 0.5–1.3)
DSDNA AB FLD-ACNC: <0.2 AI — SIGNIFICANT CHANGE UP
EGFR: 103 ML/MIN/1.73M2 — SIGNIFICANT CHANGE UP
EGFR: 103 ML/MIN/1.73M2 — SIGNIFICANT CHANGE UP
ENA SS-A AB FLD IA-ACNC: >8 AI — HIGH
FERRITIN SERPL-MCNC: 1143 NG/ML — HIGH (ref 13–330)
GLUCOSE SERPL-MCNC: 85 MG/DL — SIGNIFICANT CHANGE UP (ref 70–99)
HAPTOGLOB SERPL-MCNC: 494 MG/DL — HIGH (ref 34–200)
HAV IGM SER-ACNC: SIGNIFICANT CHANGE UP
HBV CORE AB SER-ACNC: SIGNIFICANT CHANGE UP
HBV CORE IGM SER-ACNC: SIGNIFICANT CHANGE UP
HBV SURFACE AG SER-ACNC: SIGNIFICANT CHANGE UP
HCT VFR BLD CALC: 25.2 % — LOW (ref 34.5–45)
HCV AB S/CO SERPL IA: 0.38 S/CO — SIGNIFICANT CHANGE UP (ref 0–0.79)
HCV AB SERPL-IMP: SIGNIFICANT CHANGE UP
HGB BLD-MCNC: 7.7 G/DL — LOW (ref 11.5–15.5)
LDH SERPL L TO P-CCNC: 268 U/L — HIGH (ref 50–242)
MCHC RBC-ENTMCNC: 22.1 PG — LOW (ref 27–34)
MCHC RBC-ENTMCNC: 30.6 G/DL — LOW (ref 32–36)
MCV RBC AUTO: 72.2 FL — LOW (ref 80–100)
MYELOPEROXIDASE AB SER-ACNC: <0.2 AI — SIGNIFICANT CHANGE UP
MYELOPEROXIDASE CELLS FLD QL: NEGATIVE — SIGNIFICANT CHANGE UP
NRBC BLD AUTO-RTO: 0 /100 WBCS — SIGNIFICANT CHANGE UP (ref 0–0)
PLATELET # BLD AUTO: 431 K/UL — HIGH (ref 150–400)
POTASSIUM SERPL-MCNC: 4.2 MMOL/L — SIGNIFICANT CHANGE UP (ref 3.5–5.3)
POTASSIUM SERPL-SCNC: 4.2 MMOL/L — SIGNIFICANT CHANGE UP (ref 3.5–5.3)
PROT SERPL-MCNC: 5.9 G/DL — LOW (ref 6–8.3)
PROTEINASE3 AB FLD-ACNC: <0.2 AI — SIGNIFICANT CHANGE UP
PROTEINASE3 AB SER-ACNC: NEGATIVE — SIGNIFICANT CHANGE UP
RBC # BLD: 3.49 M/UL — LOW (ref 3.8–5.2)
RBC # FLD: 21.3 % — HIGH (ref 10.3–14.5)
SODIUM SERPL-SCNC: 133 MMOL/L — LOW (ref 135–145)
WBC # BLD: 12.68 K/UL — HIGH (ref 3.8–10.5)
WBC # FLD AUTO: 12.68 K/UL — HIGH (ref 3.8–10.5)

## 2025-04-18 PROCEDURE — G0545: CPT

## 2025-04-18 PROCEDURE — 99233 SBSQ HOSP IP/OBS HIGH 50: CPT | Mod: GC

## 2025-04-18 PROCEDURE — 99232 SBSQ HOSP IP/OBS MODERATE 35: CPT

## 2025-04-18 PROCEDURE — 99233 SBSQ HOSP IP/OBS HIGH 50: CPT

## 2025-04-18 PROCEDURE — 72158 MRI LUMBAR SPINE W/O & W/DYE: CPT | Mod: 26

## 2025-04-18 RX ORDER — METHYLPREDNISOLONE ACETATE 80 MG/ML
40 INJECTION, SUSPENSION INTRA-ARTICULAR; INTRALESIONAL; INTRAMUSCULAR; SOFT TISSUE DAILY
Refills: 0 | Status: DISCONTINUED | OUTPATIENT
Start: 2025-04-18 | End: 2025-04-21

## 2025-04-18 RX ORDER — IPRATROPIUM BROMIDE AND ALBUTEROL SULFATE .5; 2.5 MG/3ML; MG/3ML
3 SOLUTION RESPIRATORY (INHALATION) EVERY 6 HOURS
Refills: 0 | Status: DISCONTINUED | OUTPATIENT
Start: 2025-04-18 | End: 2025-04-22

## 2025-04-18 RX ORDER — ALBUTEROL SULFATE 2.5 MG/3ML
2 VIAL, NEBULIZER (ML) INHALATION EVERY 6 HOURS
Refills: 0 | Status: DISCONTINUED | OUTPATIENT
Start: 2025-04-18 | End: 2025-04-22

## 2025-04-18 RX ADMIN — Medication 50 MILLIGRAM(S): at 17:10

## 2025-04-18 RX ADMIN — GABAPENTIN 300 MILLIGRAM(S): 400 CAPSULE ORAL at 21:39

## 2025-04-18 RX ADMIN — HEPARIN SODIUM 5000 UNIT(S): 1000 INJECTION INTRAVENOUS; SUBCUTANEOUS at 21:39

## 2025-04-18 RX ADMIN — HEPARIN SODIUM 5000 UNIT(S): 1000 INJECTION INTRAVENOUS; SUBCUTANEOUS at 05:37

## 2025-04-18 RX ADMIN — COLCHICINE 0.6 MILLIGRAM(S): 0.6 TABLET, FILM COATED ORAL at 17:09

## 2025-04-18 RX ADMIN — METOPROLOL SUCCINATE 50 MILLIGRAM(S): 50 TABLET, EXTENDED RELEASE ORAL at 05:37

## 2025-04-18 RX ADMIN — METHYLPREDNISOLONE ACETATE 40 MILLIGRAM(S): 80 INJECTION, SUSPENSION INTRA-ARTICULAR; INTRALESIONAL; INTRAMUSCULAR; SOFT TISSUE at 16:13

## 2025-04-18 RX ADMIN — Medication 50 MILLIGRAM(S): at 05:38

## 2025-04-18 RX ADMIN — Medication 650 MILLIGRAM(S): at 08:56

## 2025-04-18 RX ADMIN — HEPARIN SODIUM 5000 UNIT(S): 1000 INJECTION INTRAVENOUS; SUBCUTANEOUS at 13:24

## 2025-04-18 RX ADMIN — Medication 650 MILLIGRAM(S): at 09:50

## 2025-04-18 RX ADMIN — COLCHICINE 0.6 MILLIGRAM(S): 0.6 TABLET, FILM COATED ORAL at 05:38

## 2025-04-18 NOTE — PROGRESS NOTE ADULT - ASSESSMENT
64F PMH HTN, MAURICIO with symptoms of palpitations, odynophagia, dysphagia, myalgias, weight loss. Rheumatology consulted to jamil for autoimmune disease    # Proximal myalgia UE>> LE  # pericardial and pleural effusion  # odynophagia   # anemia  # Ro52 positive  # High inflammatory markers     -Pt with several months of odynophagia, then developed severe myalgias  -Currently with UE abductor weakness and pain  -Ro52 positive to 156, Ro60 negative. Previously NORMA was positive 1:1280, repeat here negative, dsDNA, EBENEZER, C3, C4, RF, CCP, scl, centromere, RNA terra 3, ANCA, Christin, APS serologies negative  -Previously CK and myoglobin was low, normal aldolase  -Has elevated ,  and ferritin 1179  -Pt also has anemia, pt has hx of anemia from menorrhagia but s/p hysterectomy for fibroids in 2009. Pt had mammo and pap last yr. Colonoscopy 2011 reportedly normal. Unclear cause of anemia, possibly mixed AOCD and iron def  -Pt previously had barium esophogram, which showed few contractions, no strictures. EGD showed gastritis, esophagus normal. Pending outpt esophageal manometry  -CT chest with small pleural effusions and moderate pericardial effusion    At this time, pt has Ro 52 positive and constellation of symptoms of odynophagia, severe myalgia of UE, with serositis. At this time, does not fit into a typical autoimmune condition, likely UCTD / PMR with sjogrens. Pt was tender to palpation lower back, MRI without discitis and OM    Recommendations:  -Recommend to give solumedrol 40mg qd stat today 4/1      Case discussed with Dr Yuan Barrios MD  Rheumatology Fellow     64F PMH HTN, MAURICIO with symptoms of palpitations, odynophagia, dysphagia, myalgias, weight loss. Rheumatology consulted to jamil for autoimmune disease    # Proximal myalgia UE>> LE  # pericardial and pleural effusion  # odynophagia   # anemia  # Ro52 positive  # High inflammatory markers     -Pt with several months of odynophagia, then developed severe myalgias  -Currently with UE abductor weakness and pain  -Ro52 positive to 156, Ro60 negative. Previously NORMA was positive 1:1280, repeat here negative, dsDNA, EBENEZER, C3, C4, RF, CCP, scl, centromere, RNA terra 3, ANCA, Christin, APS serologies negative  -Previously CK and myoglobin was low, normal aldolase  -Has elevated ,  and ferritin 1179  -Pt also has anemia, pt has hx of anemia from menorrhagia but s/p hysterectomy for fibroids in 2009. Pt had mammo and pap last yr. Colonoscopy 2011 reportedly normal. Unclear cause of anemia, possibly mixed AOCD and iron def  -Pt previously had barium esophogram, which showed few contractions, no strictures. EGD showed gastritis, esophagus normal. Pending outpt esophageal manometry  -CT chest with small pleural effusions and moderate pericardial effusion    At this time, pt has Ro 52 positive and constellation of symptoms of odynophagia, severe myalgia of UE, with serositis. At this time, does not fit into a typical autoimmune condition, likely UCTD / PMR with sjogrens. Pt was tender to palpation lower back, MRI without discitis and OM. Will trial steroids    Recommendations:  -Recommend to give solumedrol 40mg qd stat today 4/18 and through the weekend  -Will reassess Monday  -PPI while on steroids, will determine PCP ppx depending on course of steroids    Case discussed with Dr Yuan Barrios MD  Rheumatology Fellow     64F PMH HTN, MAURICIO with symptoms of palpitations, odynophagia, dysphagia, myalgias, weight loss. Rheumatology consulted to jamil for autoimmune disease    # Proximal myalgia UE>> LE  # pericardial and pleural effusion  # odynophagia   # anemia  # Ro52 positive  # High inflammatory markers     -Pt with several months of odynophagia, then developed severe myalgias, arthralgia  -Currently with UE abductor weakness , appears to be mostly due to pain  -Ro52 positive to 156, Ro60 negative. Previously NORMA was positive 1:1280, repeat here negative, dsDNA, EBENEZER, C3, C4, RF, CCP, scl, centromere, RNA terra 3, ANCA, Christin, APS serologies negative  -Previously CK and myoglobin was low, normal aldolase  -Has elevated ,  and ferritin 1179  -Pt also has anemia, pt has hx of anemia from menorrhagia but s/p hysterectomy for fibroids in 2009. Pt had mammo and pap last yr. Colonoscopy 2011 reportedly normal. Unclear cause of anemia, possibly mixed AOCD and iron def  -Pt previously had barium esophogram, which showed few contractions, no strictures. EGD showed gastritis, esophagus normal. Pending outpt esophageal manometry  -CT chest with small pleural effusions and moderate pericardial effusion    At this time, pt has Ro 52 positive and constellation of symptoms of odynophagia, severe myalgia of UE, with serositis. At this time, does not fit into a specific  autoimmune diagnosis, likely UCTD / PMR-like with Sjogren's.  MRI showed no evidence of discitis and OM. Infectious work-up is negative to date. Will trial steroids    Recommendations:  -Recommend to give solumedrol 40mg qd stat today 4/18 and through the weekend  -Will reassess Monday  -PPI while on steroids, will determine PCP ppx depending on course of steroids    Case discussed with Dr Yuan Barrios MD  Rheumatology Fellow     [Dysuria] : no dysuria [Hematuria] : no hematuria [Negative] : Integumentary

## 2025-04-18 NOTE — PROGRESS NOTE ADULT - SUBJECTIVE AND OBJECTIVE BOX
infectious diseases progress note:    Patient is a 64y old  Female who presents with a chief complaint of Palpitations (17 Apr 2025 15:14)        Palpitations               Allergies    NSAIDs (Hives)    Intolerances        ANTIBIOTICS/RELEVANT:  antimicrobials    immunologic:  influenza   Vaccine 0.5 milliLiter(s) IntraMuscular once    OTHER:  acetaminophen     Tablet .. 650 milliGRAM(s) Oral every 6 hours PRN  colchicine 0.6 milliGRAM(s) Oral two times a day  dextrose 5%. 1000 milliLiter(s) IV Continuous <Continuous>  dextrose 5%. 1000 milliLiter(s) IV Continuous <Continuous>  dextrose 50% Injectable 25 Gram(s) IV Push once  dextrose 50% Injectable 12.5 Gram(s) IV Push once  dextrose 50% Injectable 25 Gram(s) IV Push once  dextrose Oral Gel 15 Gram(s) Oral once  flecainide 50 milliGRAM(s) Oral every 12 hours  gabapentin 300 milliGRAM(s) Oral at bedtime  glucagon  Injectable 1 milliGRAM(s) IntraMuscular once  heparin   Injectable 5000 Unit(s) SubCutaneous every 8 hours  metoprolol succinate ER 50 milliGRAM(s) Oral daily      Objective:  Vital Signs Last 24 Hrs  T(C): 36.7 (18 Apr 2025 05:06), Max: 37.5 (17 Apr 2025 20:56)  T(F): 98 (18 Apr 2025 05:06), Max: 99.5 (17 Apr 2025 20:56)  HR: 72 (18 Apr 2025 05:06) (72 - 88)  BP: 143/80 (18 Apr 2025 05:06) (125/78 - 148/79)  BP(mean): --  RR: 18 (18 Apr 2025 05:06) (18 - 19)  SpO2: 99% (18 Apr 2025 05:06) (95% - 99%)    Parameters below as of 18 Apr 2025 05:06  Patient On (Oxygen Delivery Method): nasal cannula  O2 Flow (L/min): 2         Ear/Nose/Throat: no oral lesion, no sinus tenderness on percussion	  Neck:no JVD, no lymphadenopathy, supple  Respiratory: CTA rula  Cardiovascular: S1S2 RRR, no murmurs  Gastrointestinal:soft, (+) BS, no HSM  Extremities:no e/e/c        LABS:                        7.7    12.68 )-----------( 431      ( 18 Apr 2025 06:59 )             25.2     04-18    133[L]  |  98  |  8   ----------------------------<  85  4.2   |  24  |  0.54    Ca    8.4      18 Apr 2025 06:56  Phos  3.2     04-17  Mg     1.9     04-17    TPro  6.1  /  Alb  2.2[L]  /  TBili  0.3  /  DBili  x   /  AST  32  /  ALT  21  /  AlkPhos  80  04-17      Urinalysis Basic - ( 18 Apr 2025 06:56 )    Color: x / Appearance: x / SG: x / pH: x  Gluc: 85 mg/dL / Ketone: x  / Bili: x / Urobili: x   Blood: x / Protein: x / Nitrite: x   Leuk Esterase: x / RBC: x / WBC x   Sq Epi: x / Non Sq Epi: x / Bacteria: x          MICROBIOLOGY:    RECENT CULTURES:  04-16 @ 04:50 Blood Blood-Peripheral                No growth at 24 hours    04-16 @ 04:35 Blood Blood-Peripheral                No growth at 24 hours          RESPIRATORY CULTURES:              RADIOLOGY & ADDITIONAL STUDIES:        Pager [phone]  After 5 pm/weekends or if no response :[phone]

## 2025-04-18 NOTE — PROGRESS NOTE ADULT - ASSESSMENT
63 yo F with PMH of HTN, MAURICIO, and recent pneumonia presenting for odynophagia, dysphagia, dyspnea, and weakness for 3  months, found to have elevated CRP, microcytic anemia, and positive NORMA c/f systemic process.     Impression:  #Odynophagia, Dysphagia  #Iron Deficiency Anemia  #Myalgias dyspnea, weakness  #Positive inflammatory markers    P/w systemic symptoms for 2-3 months with dyspnea, tachycardia, myalgias and weakness. She endorses dysphagia to solid>liquids and odynophagia with any swallowing even saliva for the same time period. On admission, pt found to have low grade temp to 100.2 and mild tachycardia with wbc 14.2. Labs notable for Hgb 8 (prior baseline 10.4 1/2025), MCV 73.2, , proteinuria, and albumin 2.9.  Workup at OSH notable for NORMA 1:1280. Barium esophagram at [place] on 3/5 showed a few tertiary contractions commiserate with age and no stricture or mucosal abnormality. 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 Rheumatological disorders, such as scleroderma, Sjogren's syndrome, systemic lupus erythematosus, rheumatoid arthritis, sarcoidosis, Behcet's disease, anti-neutrophil cytoplasmic antibody (ANCA)-associated vasculitis, or granulomatosis with polyangiitis, have been associated with dysphagia. Dermatomyositis and polymyositis also on ddx. Structural cause such as adenocarcinoma also possible though systemic cause more likely given pt's symptoms. Thyroid disorder also on ddx. 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 Her last EGD/colonoscopy was 2011 which showed gastritis and hemorrhoids but she has not had repeat evaluation since she was found to have MAURICIO.    S/P EGD 3/10, normal appearing esophagus and gastritis, biopsied.   was discharged but states she has gotten worse since March with weakness, edema, dysphagia, cp, gage.  noted to have an elevated wbc  no eosinophil  non toxic  febrile in er but not since    Dysphagia  Chest pain  with elevated trop   edema  Elevated NORMA and inflammatory markers  improvement with steroids in the past      no evidence of infection  would send Quanti[employer]eron  It seems that although she may not fit into a diagnostic category that she has a rheumatologic disease that will need to be treated

## 2025-04-18 NOTE — DIETITIAN INITIAL EVALUATION ADULT - REASON
Patient off unit during visits. Unable to perform nutrition focused physical exam at this time. RD to perform at follow up or as able.

## 2025-04-18 NOTE — DIETITIAN INITIAL EVALUATION ADULT - REASON INDICATOR FOR ASSESSMENT
Dietitian consult received for: Nutrition services, assessment, MST score 2 or >   Chart reviewed, events noted  Information obtained from: electronic medical record, patient

## 2025-04-18 NOTE — DIETITIAN INITIAL EVALUATION ADULT - LITERATURE/VIDEOS GIVEN
Patient not in room during visits. Unable to provide diet education at this time. RD remains available to provide diet education as able.

## 2025-04-18 NOTE — PROGRESS NOTE ADULT - SUBJECTIVE AND OBJECTIVE BOX
STEFAN WASHINGTON  [id number]    INTERVAL HPI/OVERNIGHT EVENTS:    MEDICATIONS  (STANDING):  colchicine 0.6 milliGRAM(s) Oral two times a day  dextrose 5%. 1000 milliLiter(s) (100 mL/Hr) IV Continuous <Continuous>  dextrose 5%. 1000 milliLiter(s) (50 mL/Hr) IV Continuous <Continuous>  dextrose 50% Injectable 25 Gram(s) IV Push once  dextrose 50% Injectable 12.5 Gram(s) IV Push once  dextrose 50% Injectable 25 Gram(s) IV Push once  dextrose Oral Gel 15 Gram(s) Oral once  flecainide 50 milliGRAM(s) Oral every 12 hours  gabapentin 300 milliGRAM(s) Oral at bedtime  glucagon  Injectable 1 milliGRAM(s) IntraMuscular once  heparin   Injectable 5000 Unit(s) SubCutaneous every 8 hours  influenza   Vaccine 0.5 milliLiter(s) IntraMuscular once  metoprolol succinate ER 50 milliGRAM(s) Oral daily    MEDICATIONS  (PRN):  acetaminophen     Tablet .. 650 milliGRAM(s) Oral every 6 hours PRN Temp greater or equal to 38C (100.4F), Mild Pain (1 - 3)      Allergies    NSAIDs (Hives)    Intolerances        Review of Systems:   General: No fevers/chills, no fatigue  HEENT: No blurry vision, dysphagia, or odynophagia  CVS: No CP/palpitations  Resp: No SOB/wheezing  GI: No N/V/C/D/abdominal pain  MSK:   Skin: No new rashes  Neuro: No headaches      Vital Signs Last 24 Hrs  T(C): 36.9 (18 Apr 2025 12:00), Max: 37.5 (17 Apr 2025 20:56)  T(F): 98.5 (18 Apr 2025 12:00), Max: 99.5 (17 Apr 2025 20:56)  HR: 75 (18 Apr 2025 12:00) (72 - 88)  BP: 145/85 (18 Apr 2025 12:00) (143/80 - 148/79)  BP(mean): --  RR: 18 (18 Apr 2025 12:00) (18 - 19)  SpO2: 98% (18 Apr 2025 12:00) (95% - 99%)    Parameters below as of 18 Apr 2025 05:06  Patient On (Oxygen Delivery Method): nasal cannula  O2 Flow (L/min): 2      Physical Exam:  General: No apparent distress  MSK: tenderness over  L3-4 spinal processes, no swelling/warmth/erythema of the joints of the UE/LE. proximal UE 2/5, LE hip flexors 4/5, distal strength 5/5. Point tenderness lower spine  Skin: no visible rashes      LABS:                        7.7    12.68 )-----------( 431      ( 18 Apr 2025 06:59 )             25.2     04-18    133[L]  |  98  |  8   ----------------------------<  85  4.2   |  24  |  0.54    Ca    8.4      18 Apr 2025 06:56  Phos  3.2     04-17  Mg     1.9     04-17    TPro  5.9[L]  /  Alb  x   /  TBili  x   /  DBili  x   /  AST  x   /  ALT  x   /  AlkPhos  x   04-18      Urinalysis Basic - ( 18 Apr 2025 06:56 )    Color: x / Appearance: x / SG: x / pH: x  Gluc: 85 mg/dL / Ketone: x  / Bili: x / Urobili: x   Blood: x / Protein: x / Nitrite: x   Leuk Esterase: x / RBC: x / WBC x   Sq Epi: x / Non Sq Epi: x / Bacteria: x          RADIOLOGY & ADDITIONAL TESTS:  < from: MR Lumbar Spine w/wo IV Cont (04.18.25 @ 11:20) >  IMPRESSION: No evidence of osteomyelitis or discitis. No drainable   collections.    < end of copied text >    < from: CT Angio Chest PE Protocol w/ IV Cont (04.16.25 @ 06:21) >  IMPRESSION:    No pulmonary embolism within the confines of this exam.    Small bilateral pleural effusions and bibasilar atelectasis.    Cardiomegaly. Moderate sized pericardial effusion compared with prior   exam from 3/7/2025.    < end of copied text >     STEFAN WASHINGTON  [id number]    INTERVAL HPI/OVERNIGHT EVENTS: Symptoms similar to yesterday    MEDICATIONS  (STANDING):  colchicine 0.6 milliGRAM(s) Oral two times a day  dextrose 5%. 1000 milliLiter(s) (100 mL/Hr) IV Continuous <Continuous>  dextrose 5%. 1000 milliLiter(s) (50 mL/Hr) IV Continuous <Continuous>  dextrose 50% Injectable 25 Gram(s) IV Push once  dextrose 50% Injectable 12.5 Gram(s) IV Push once  dextrose 50% Injectable 25 Gram(s) IV Push once  dextrose Oral Gel 15 Gram(s) Oral once  flecainide 50 milliGRAM(s) Oral every 12 hours  gabapentin 300 milliGRAM(s) Oral at bedtime  glucagon  Injectable 1 milliGRAM(s) IntraMuscular once  heparin   Injectable 5000 Unit(s) SubCutaneous every 8 hours  influenza   Vaccine 0.5 milliLiter(s) IntraMuscular once  metoprolol succinate ER 50 milliGRAM(s) Oral daily    MEDICATIONS  (PRN):  acetaminophen     Tablet .. 650 milliGRAM(s) Oral every 6 hours PRN Temp greater or equal to 38C (100.4F), Mild Pain (1 - 3)      Allergies    NSAIDs (Hives)    Intolerances        Review of Systems:   General: No fevers/chills, + fatigue  HEENT:+odynophagia  CVS: No CP/palpitations  Resp: No SOB/wheezing  GI: No N/V/C/D/abdominal pain  MSK: UE pain  Skin: No new rashes  Neuro: No headaches      Vital Signs Last 24 Hrs  T(C): 36.9 (18 Apr 2025 12:00), Max: 37.5 (17 Apr 2025 20:56)  T(F): 98.5 (18 Apr 2025 12:00), Max: 99.5 (17 Apr 2025 20:56)  HR: 75 (18 Apr 2025 12:00) (72 - 88)  BP: 145/85 (18 Apr 2025 12:00) (143/80 - 148/79)  BP(mean): --  RR: 18 (18 Apr 2025 12:00) (18 - 19)  SpO2: 98% (18 Apr 2025 12:00) (95% - 99%)    Parameters below as of 18 Apr 2025 05:06  Patient On (Oxygen Delivery Method): nasal cannula  O2 Flow (L/min): 2      Physical Exam:  General: No apparent distress  MSK: tenderness over  L3-4 spinal processes, no swelling/warmth/erythema of the joints of the UE/LE. proximal UE 2/5, LE hip flexors 4/5, distal strength 5/5. Point tenderness lower spine  Skin: no visible rashes      LABS:                        7.7    12.68 )-----------( 431      ( 18 Apr 2025 06:59 )             25.2     04-18    133[L]  |  98  |  8   ----------------------------<  85  4.2   |  24  |  0.54    Ca    8.4      18 Apr 2025 06:56  Phos  3.2     04-17  Mg     1.9     04-17    TPro  5.9[L]  /  Alb  x   /  TBili  x   /  DBili  x   /  AST  x   /  ALT  x   /  AlkPhos  x   04-18      Urinalysis Basic - ( 18 Apr 2025 06:56 )    Color: x / Appearance: x / SG: x / pH: x  Gluc: 85 mg/dL / Ketone: x  / Bili: x / Urobili: x   Blood: x / Protein: x / Nitrite: x   Leuk Esterase: x / RBC: x / WBC x   Sq Epi: x / Non Sq Epi: x / Bacteria: x          RADIOLOGY & ADDITIONAL TESTS:  < from: MR Lumbar Spine w/wo IV Cont (04.18.25 @ 11:20) >  IMPRESSION: No evidence of osteomyelitis or discitis. No drainable   collections.    < end of copied text >    < from: CT Angio Chest PE Protocol w/ IV Cont (04.16.25 @ 06:21) >  IMPRESSION:    No pulmonary embolism within the confines of this exam.    Small bilateral pleural effusions and bibasilar atelectasis.    Cardiomegaly. Moderate sized pericardial effusion compared with prior   exam from 3/7/2025.    < end of copied text >

## 2025-04-18 NOTE — PROGRESS NOTE ADULT - SUBJECTIVE AND OBJECTIVE BOX
DATE OF SERVICE: 04-18-25 @ 09:22    Patient is a 64y old  Female who presents with a chief complaint of Palpitations (18 Apr 2025 08:28)      INTERVAL HISTORY: in no acute distress    REVIEW OF SYSTEMS:  CONSTITUTIONAL: No weakness  EYES/ENT: No visual changes;  No throat pain   NECK: No pain or stiffness  RESPIRATORY: No cough, wheezing; No shortness of breath  CARDIOVASCULAR: No chest pain or palpitations  GASTROINTESTINAL: No abdominal  pain. No nausea, vomiting, or hematemesis  GENITOURINARY: No dysuria, frequency or hematuria  NEUROLOGICAL: No stroke like symptoms  SKIN: No rashes    TELEMETRY Personally reviewed: SR 70 - 90s  	  MEDICATIONS:  flecainide 50 milliGRAM(s) Oral every 12 hours  metoprolol succinate ER 50 milliGRAM(s) Oral daily        PHYSICAL EXAM:  T(C): 36.7 (04-18-25 @ 05:06), Max: 37.5 (04-17-25 @ 20:56)  HR: 72 (04-18-25 @ 05:06) (72 - 88)  BP: 143/80 (04-18-25 @ 05:06) (125/78 - 148/79)  RR: 18 (04-18-25 @ 05:06) (18 - 19)  SpO2: 99% (04-18-25 @ 05:06) (95% - 99%)  Wt(kg): --  I&O's Summary    17 Apr 2025 07:01  -  18 Apr 2025 07:00  --------------------------------------------------------  IN: 110 mL / OUT: 300 mL / NET: -190 mL          Appearance: In no distress	  HEENT:    PERRL, EOMI	  Cardiovascular:  S1 S2, No JVD  Respiratory: Lungs clear to auscultation	  Gastrointestinal:  Soft, Non-tender, + BS	  Vascularature:  No edema of LE  Psychiatric: Appropriate affect   Neuro: no acute focal deficits                               7.7    12.68 )-----------( 431      ( 18 Apr 2025 06:59 )             25.2     04-18    133[L]  |  98  |  8   ----------------------------<  85  4.2   |  24  |  0.54    Ca    8.4      18 Apr 2025 06:56  Phos  3.2     04-17  Mg     1.9     04-17    TPro  6.1  /  Alb  2.2[L]  /  TBili  0.3  /  DBili  x   /  AST  32  /  ALT  21  /  AlkPhos  80  04-17        Labs personally reviewed      ASSESSMENT/PLAN: 	      < from: TTE Limited W or WO Ultrasound Enhancing Agent (04.17.25 @ 10:16) >     CONCLUSIONS:      1. Left ventricular systolic function is normal.   2. Small to moderate pericardial effusion noted adjacent to the posterolateral left ventricle with no echocardiographic evidence of tamponade physiology.   3. Thickened pericardium.   4. Bilateral pleural effusion noted.   5. Compared to the transthoracic echocardiogram performed on 3/11/2025, there have been no significant interval changes.    < from: Nuclear Stress Test-Pharmacologic.. (04.14.25 @ 14:51) >  Conclusions:   1. Normal myocardial perfusion scan, with no evidence of infarction or inducible ischemia.   2. Qualitative Perfusion:      - small-sized, mild defect(s) in the distal inferolateral wall that is fixed suggestive of attenuation artifact.   3. Perfusion Findings: Fixed defect with normal motion, likely attenuation artifact.   4. The left ventricle is normal in function and small in size. The post stress left ventricular EF is 84 %. The stress end diastolic volume is 35 ml and systolic volume is 6 ml.      ASSESSMENT/PLAN: 	    64F with hx HTN, MAURICIO previously on IV iron, recent admission for odynophagia/dysphagia, elevated inflammatory markers presents with palpitations and chest pain.    1. Tachyarrhythmia - pSVT  - EP consulted for rythym control strategy eval   - Per EP - ECGs consistent with SVT with a pathway  - ?precipitated by viral pericarditis   - 4/16 EP started pt on Metoprolol 50mg QD and Flecainide 50mg q12 hrs; pt has been in SR since then  - Continue tele monitoring     2. Chest pain  - Recent NST (4/14/25) normal myocardial perfusion  - TTE 4/17/25 - preserved EF, small to mod pericardial effusion, with no evidence of tamponade, thickened pericardium, b/l pleural effusion (compared to small effusion 3/11/25)  - ProBNP elevated 2944 (compared to 318 last month)   - 4/17 started Colchicine 0.6mg BID for likely pericarditis with TTE findings and pleuritic chest pain  - Repeat echo in 4/19 to assess for progression of pericardial effusion      3. Systemic inflammatory response syndrome (SIRS).   ·  Plan: Pt with leukocytosis and febrile episode 101 in ED  - Viral panel negative, UA negative for UTI, blood cx NGTD  - CT chest negative for pna   - LE dopplers negative for DVT   - ID following, less likely infectious suspect autoimmune. Monitor off abx.   - Rheumatology consulted - undergoing autoimmune w/u        HARJINDER Aragon,  [facility]  Cardiovascular Medicine  [address], [address]  Available through call or text on [employer]  Office: [phone]     DATE OF SERVICE: 04-18-25 @ 09:22    Patient is a 64y old  Female who presents with a chief complaint of Palpitations (18 Apr 2025 08:28)      INTERVAL HISTORY: pt reports symptoms same as yesterday    REVIEW OF SYSTEMS:  CONSTITUTIONAL: No weakness  EYES/ENT: No visual changes;  No throat pain   NECK: No pain or stiffness  RESPIRATORY: No cough, wheezing; No shortness of breath  CARDIOVASCULAR: No chest pain or palpitations  GASTROINTESTINAL: No abdominal  pain. No nausea, vomiting, or hematemesis  GENITOURINARY: No dysuria, frequency or hematuria  NEUROLOGICAL: No stroke like symptoms  SKIN: No rashes    TELEMETRY Personally reviewed: SR 70 - 90s  	  MEDICATIONS:  flecainide 50 milliGRAM(s) Oral every 12 hours  metoprolol succinate ER 50 milliGRAM(s) Oral daily        PHYSICAL EXAM:  T(C): 36.7 (04-18-25 @ 05:06), Max: 37.5 (04-17-25 @ 20:56)  HR: 72 (04-18-25 @ 05:06) (72 - 88)  BP: 143/80 (04-18-25 @ 05:06) (125/78 - 148/79)  RR: 18 (04-18-25 @ 05:06) (18 - 19)  SpO2: 99% (04-18-25 @ 05:06) (95% - 99%)  Wt(kg): --  I&O's Summary    17 Apr 2025 07:01  -  18 Apr 2025 07:00  --------------------------------------------------------  IN: 110 mL / OUT: 300 mL / NET: -190 mL          Appearance: In no distress	  HEENT:    PERRL, EOMI	  Cardiovascular:  S1 S2, No JVD  Respiratory: Lungs clear to auscultation	  Gastrointestinal:  Soft, Non-tender, + BS	  Vascularature:  No edema of LE  Psychiatric: Appropriate affect   Neuro: no acute focal deficits                               7.7    12.68 )-----------( 431      ( 18 Apr 2025 06:59 )             25.2     04-18    133[L]  |  98  |  8   ----------------------------<  85  4.2   |  24  |  0.54    Ca    8.4      18 Apr 2025 06:56  Phos  3.2     04-17  Mg     1.9     04-17    TPro  6.1  /  Alb  2.2[L]  /  TBili  0.3  /  DBili  x   /  AST  32  /  ALT  21  /  AlkPhos  80  04-17        Labs personally reviewed      ASSESSMENT/PLAN: 	      < from: TTE Limited W or WO Ultrasound Enhancing Agent (04.17.25 @ 10:16) >     CONCLUSIONS:      1. Left ventricular systolic function is normal.   2. Small to moderate pericardial effusion noted adjacent to the posterolateral left ventricle with no echocardiographic evidence of tamponade physiology.   3. Thickened pericardium.   4. Bilateral pleural effusion noted.   5. Compared to the transthoracic echocardiogram performed on 3/11/2025, there have been no significant interval changes.    < from: Nuclear Stress Test-Pharmacologic.. (04.14.25 @ 14:51) >  Conclusions:   1. Normal myocardial perfusion scan, with no evidence of infarction or inducible ischemia.   2. Qualitative Perfusion:      - small-sized, mild defect(s) in the distal inferolateral wall that is fixed suggestive of attenuation artifact.   3. Perfusion Findings: Fixed defect with normal motion, likely attenuation artifact.   4. The left ventricle is normal in function and small in size. The post stress left ventricular EF is 84 %. The stress end diastolic volume is 35 ml and systolic volume is 6 ml.      ASSESSMENT/PLAN: 	    64F with hx HTN, MAURICIO previously on IV iron, recent admission for odynophagia/dysphagia, elevated inflammatory markers presents with palpitations and chest pain.    1. Tachyarrhythmia - pSVT  - EP consulted for rythym control strategy eval   - Per EP - ECGs consistent with SVT with a pathway  - ?precipitated by viral pericarditis   - 4/16 EP started pt on Metoprolol 50mg QD and Flecainide 50mg q12 hrs; pt has been in SR since then  - Continue tele monitoring     2. Chest pain  - Recent NST (4/14/25) normal myocardial perfusion  - TTE 4/17/25 - preserved EF, small to mod pericardial effusion, with no evidence of tamponade, thickened pericardium, b/l pleural effusion (compared to small effusion 3/11/25)  - ProBNP elevated 2944 (compared to 318 last month)   - 4/17 started Colchicine 0.6mg BID for likely pericarditis with TTE findings and pleuritic chest pain  - Repeat echo in 4/19 to assess for progression of pericardial effusion      3. Systemic inflammatory response syndrome (SIRS).   ·  Plan: Pt with leukocytosis and febrile episode 101 in ED  - Viral panel negative, UA negative for UTI, blood cx NGTD  - CT chest negative for pna   - LE dopplers negative for DVT   - ID following, less likely infectious suspect autoimmune. Monitor off abx.   - Rheumatology consulted - undergoing autoimmune w/u        HARJINDER Aragon,  [facility]  Cardiovascular Medicine  [address], [address]  Available through call or text on [employer]  Office: [phone]     DATE OF SERVICE: 04-18-25 @ 09:22    Patient is a 64y old  Female who presents with a chief complaint of Palpitations (18 Apr 2025 08:28)      INTERVAL HISTORY: pt reports symptoms same as yesterday    REVIEW OF SYSTEMS:  CONSTITUTIONAL: No weakness  EYES/ENT: No visual changes;  No throat pain   NECK: No pain or stiffness  RESPIRATORY: No cough, wheezing; No shortness of breath  CARDIOVASCULAR: + pleuritic chest pain, no palpitations  GASTROINTESTINAL: No abdominal  pain. No nausea, vomiting, or hematemesis  GENITOURINARY: No dysuria, frequency or hematuria  NEUROLOGICAL: No stroke like symptoms  SKIN: No rashes    TELEMETRY Personally reviewed: SR 70 - 90s  	  MEDICATIONS:  flecainide 50 milliGRAM(s) Oral every 12 hours  metoprolol succinate ER 50 milliGRAM(s) Oral daily        PHYSICAL EXAM:  T(C): 36.7 (04-18-25 @ 05:06), Max: 37.5 (04-17-25 @ 20:56)  HR: 72 (04-18-25 @ 05:06) (72 - 88)  BP: 143/80 (04-18-25 @ 05:06) (125/78 - 148/79)  RR: 18 (04-18-25 @ 05:06) (18 - 19)  SpO2: 99% (04-18-25 @ 05:06) (95% - 99%)  Wt(kg): --  I&O's Summary    17 Apr 2025 07:01  -  18 Apr 2025 07:00  --------------------------------------------------------  IN: 110 mL / OUT: 300 mL / NET: -190 mL          Appearance: In no distress	  HEENT:    PERRL, EOMI	  Cardiovascular:  S1 S2, No JVD  Respiratory: Lungs clear to auscultation	  Gastrointestinal:  Soft, Non-tender, + BS	  Vascularature:  No edema of LE  Psychiatric: Appropriate affect   Neuro: no acute focal deficits                               7.7    12.68 )-----------( 431      ( 18 Apr 2025 06:59 )             25.2     04-18    133[L]  |  98  |  8   ----------------------------<  85  4.2   |  24  |  0.54    Ca    8.4      18 Apr 2025 06:56  Phos  3.2     04-17  Mg     1.9     04-17    TPro  6.1  /  Alb  2.2[L]  /  TBili  0.3  /  DBili  x   /  AST  32  /  ALT  21  /  AlkPhos  80  04-17        Labs personally reviewed      ASSESSMENT/PLAN: 	      < from: TTE Limited W or WO Ultrasound Enhancing Agent (04.17.25 @ 10:16) >     CONCLUSIONS:      1. Left ventricular systolic function is normal.   2. Small to moderate pericardial effusion noted adjacent to the posterolateral left ventricle with no echocardiographic evidence of tamponade physiology.   3. Thickened pericardium.   4. Bilateral pleural effusion noted.   5. Compared to the transthoracic echocardiogram performed on 3/11/2025, there have been no significant interval changes.    < from: Nuclear Stress Test-Pharmacologic.. (04.14.25 @ 14:51) >  Conclusions:   1. Normal myocardial perfusion scan, with no evidence of infarction or inducible ischemia.   2. Qualitative Perfusion:      - small-sized, mild defect(s) in the distal inferolateral wall that is fixed suggestive of attenuation artifact.   3. Perfusion Findings: Fixed defect with normal motion, likely attenuation artifact.   4. The left ventricle is normal in function and small in size. The post stress left ventricular EF is 84 %. The stress end diastolic volume is 35 ml and systolic volume is 6 ml.      ASSESSMENT/PLAN: 	    64F with hx HTN, MAURICIO previously on IV iron, recent admission for odynophagia/dysphagia, elevated inflammatory markers presents with palpitations and chest pain.    1. Tachyarrhythmia - pSVT  - EP consulted for rythym control strategy eval   - Per EP - ECGs consistent with SVT with a pathway  - ?precipitated by viral pericarditis   - 4/16 EP started pt on Metoprolol 50mg QD and Flecainide 50mg q12 hrs; pt has been in SR since then  - Continue tele monitoring     2. Chest pain  - Recent NST (4/14/25) normal myocardial perfusion  - TTE 4/17/25 - preserved EF, small to mod pericardial effusion, with no evidence of tamponade, thickened pericardium, b/l pleural effusion (compared to small effusion 3/11/25)  - ProBNP elevated 2944 (compared to 318 last month)   - 4/17 started Colchicine 0.6mg BID for likely pericarditis with TTE findings and pleuritic chest pain  - Repeat echo to assess for progression of pericardial effusion      3. Systemic inflammatory response syndrome (SIRS).   ·  Plan: Pt with leukocytosis and febrile episode 101 in ED  - Viral panel negative, UA negative for UTI, blood cx NGTD  - CT chest negative for pna   - LE dopplers negative for DVT   - ID following, less likely infectious suspect autoimmune. Monitor off abx.   - Rheumatology consulted - undergoing autoimmune w/u        HARJINDER Aragon,  [facility]  Cardiovascular Medicine  [address], [address]  Available through call or text on [employer]  Office: [phone]

## 2025-04-18 NOTE — DIETITIAN INITIAL EVALUATION ADULT - ADD RECOMMEND
1. Continue Soft and Bite Sized Diet order as medically appropriate   - Should patient exhibit s/s of aspiration, recommend NPO diet order and order swallow evaluation to determine safest PO intake and consistencies  2. Recommend trial Ensure Plus High Protein once daily (350kals, 20g protein) for p.o. optimization  3. Replete electrolytes prn per team discretion   4. Monitor routine weights, nutrition related labs, p.o. intake and tolerance, and oral nutrition supplement compliance

## 2025-04-18 NOTE — DIETITIAN INITIAL EVALUATION ADULT - PHYSCIAL ASSESSMENT
- Per [facility] HIE: 139lbs (11/07/2024), 129lbs (1/13/2025), 126lbs (1/31/2025), 117lbs (3/13/2025), 119lbs (4/16/2025). 14.4% weight loss x 5 months indicated.     - Per chart, patient's current dosing weight 119lbs (4/16/2025).

## 2025-04-18 NOTE — DIETITIAN INITIAL EVALUATION ADULT - CALCULATED TO (G/KG)
Assessment: Pt lethargic, nonverbal. Pt seen for severe malnutrition follow up. Pt admitted with reoccurring UTI associated with indwelling cathter. PMH CVA, seizure disorder, OA, asthma, DM, HTN, dementia.  Per chart review no GI distress noted, pt tolerating puree foods without difficulties. Pressure injuries noted.      Factors impacting intake: [ ] none [ ] nausea  [ ] vomiting [ ] diarrhea [ ] constipation  [ ]chewing problems [ ] swallowing issues  [x ] other: cognitive limitations     Diet Prescription: Diet, Consistent Carbohydrate/No Snacks:   Pureed  Supplement Feeding Modality:  Oral  Glucerna Shake Cans or Servings Per Day:  1       Frequency:  Two Times a day (08-15-20 @ 16:27)    Intake: ~75% of meals/supplements (varying PO intake)     Current Weight: Weight (kg): 56.4kg (08-)   ;   56.3 (08-)  % Weight Change: < 1% change     Physical appearance: BMI 23.7, R arm 1+ edema noted on 08/15    Pertinent Medications: MEDICATIONS  (STANDING):  amLODIPine   Tablet 10 milliGRAM(s) Oral daily  cefepime   IVPB 2000 milliGRAM(s) IV Intermittent every 8 hours  cloNIDine 0.1 milliGRAM(s) Oral two times a day  heparin   Injectable 5000 Unit(s) SubCutaneous every 12 hours  hydrALAZINE 25 milliGRAM(s) Oral every 8 hours  levETIRAcetam 500 milliGRAM(s) Oral two times a day  metoprolol tartrate 50 milliGRAM(s) Oral two times a day  polyethylene glycol 3350 17 Gram(s) Oral two times a day  senna 2 Tablet(s) Oral at bedtime  simvastatin 20 milliGRAM(s) Oral at bedtime  sodium chloride 0.9%. 1000 milliLiter(s) (75 mL/Hr) IV Continuous <Continuous>  vitamin A &amp; D Ointment 1 Application(s) Topical two times a day    MEDICATIONS  (PRN):  acetaminophen   Tablet .. 650 milliGRAM(s) Oral every 6 hours PRN Temp greater or equal to 38C (100.4F), Mild Pain (1 - 3)    Pertinent Labs: 08-18 Na 140 mmol/L Glu 157 mg/dL<H> K+ 3.8 mmol/L Cr 0.87 mg/dL BUN 9 mg/dL Phos 3.4 mg/dL Alb n/a   PAB n/a   Hgb 7.8 g/dL<L> Hct 24.1 %<L> HgA1C n/a    Glucose, Serum: 157 mg/dL <H>   24Hr FS:170 mg/dL  177 mg/dL  134 mg/dL    Skin: R buttocks stage III, Inner R buttocks stage II, L buttocks stage III     Estimated Needs:   [x ] no change since previous assessment (08-)   [ ] recalculated:     Previous Nutrition Diagnosis:   Nutrition Diagnostic Terminology #1 Malnutrition...   Malnutrition Severe malnutrition in context of acute illness.   Etiology Inadequate energy/protein intake + increased energy/protein needs related to unstageable pressure ulcer, UTI, AMADOU, Sepsis.   Signs/Symptoms Physical findings of moderate/severe fat depletion & moderate muscle wasting.     Previous Goal/Expected Outcome Pt to consume >50% meals/supplements; maintain wt +/- 2# during hospitalization (not consistently met---> continuing previous goal) .      Nutrition Diagnosis is [x ] ongoing  [ ] resolved  [ ] improved  [ ] not applicable       New Nutrition Diagnosis: [x ] not applicable       Interventions:   Recommend  [ x] Continue: Current diet as Rx   [ ] Change Diet To:  [ ] Nutrition Supplement:  [ ] Nutrition Support:  [ x] Other:  Provide assistance and lots of encouragement with meals/supplements     Monitoring and Evaluation:   [x] PO intake [ x ] Tolerance to diet prescription [ x ] weights [ x ] labs[ x ] follow up per protocol  [ ] other: 64.68

## 2025-04-18 NOTE — DIETITIAN INITIAL EVALUATION ADULT - PROBLEM SELECTOR PLAN 3
Pt with chest pain/dyspnea   Trend troponins   Continue with tele monitoring   CTA negative for PE  LE dopplers negative for DVT   Pericardial effusion on echo   Check stress test   Cards follow up

## 2025-04-18 NOTE — PROGRESS NOTE ADULT - SUBJECTIVE AND OBJECTIVE BOX
Cortney Holden DO  Division of Hospital Medicine  Available on MS Teams  PROGRESS NOTE:     Patient is a 64y old  Female who presents with a chief complaint of Palpitations (17 Apr 2025 14:26)      SUBJECTIVE / OVERNIGHT EVENTS:  Overnight with coughing episode and SPO2 desat to 84% on RA and started on 2L NC. TSPO2 99% 2L today. Continues to have generalized body aches and weakness.     ADDITIONAL REVIEW OF SYSTEMS:  Negative unless specified above.     MEDICATIONS  (STANDING):  albuterol    90 MICROgram(s) HFA Inhaler 2 Puff(s) Inhalation every 6 hours  colchicine 0.6 milliGRAM(s) Oral two times a day  dextrose 5%. 1000 milliLiter(s) (100 mL/Hr) IV Continuous <Continuous>  dextrose 5%. 1000 milliLiter(s) (50 mL/Hr) IV Continuous <Continuous>  dextrose 50% Injectable 25 Gram(s) IV Push once  dextrose 50% Injectable 12.5 Gram(s) IV Push once  dextrose 50% Injectable 25 Gram(s) IV Push once  dextrose Oral Gel 15 Gram(s) Oral once  flecainide 50 milliGRAM(s) Oral every 12 hours  gabapentin 300 milliGRAM(s) Oral at bedtime  glucagon  Injectable 1 milliGRAM(s) IntraMuscular once  heparin   Injectable 5000 Unit(s) SubCutaneous every 8 hours  influenza   Vaccine 0.5 milliLiter(s) IntraMuscular once  methylPREDNISolone sodium succinate Injectable 40 milliGRAM(s) IV Push daily  metoprolol succinate ER 50 milliGRAM(s) Oral daily    MEDICATIONS  (PRN):  acetaminophen     Tablet .. 650 milliGRAM(s) Oral every 6 hours PRN Temp greater or equal to 38C (100.4F), Mild Pain (1 - 3)  albuterol/ipratropium for Nebulization 3 milliLiter(s) Nebulizer every 6 hours PRN Bronchospasm  ipratropium    for Nebulization 500 MICROGram(s) Nebulizer every 6 hours PRN Bronchospasm      CAPILLARY BLOOD GLUCOSE      I&O's Summary    17 Apr 2025 07:01  -  18 Apr 2025 07:00  --------------------------------------------------------  IN: 110 mL / OUT: 300 mL / NET: -190 mL    18 Apr 2025 07:01  -  18 Apr 2025 14:45  --------------------------------------------------------  IN: 0 mL / OUT: 150 mL / NET: -150 mL          PHYSICAL EXAM:  Vital Signs Last 24 Hrs  T(C): 36.9 (18 Apr 2025 12:00), Max: 37.5 (17 Apr 2025 20:56)  T(F): 98.5 (18 Apr 2025 12:00), Max: 99.5 (17 Apr 2025 20:56)  HR: 75 (18 Apr 2025 12:00) (72 - 88)  BP: 145/85 (18 Apr 2025 12:00) (143/80 - 148/79)  BP(mean): --  RR: 18 (18 Apr 2025 12:00) (18 - 19)  SpO2: 98% (18 Apr 2025 12:00) (95% - 99%)    Parameters below as of 18 Apr 2025 05:06  Patient On (Oxygen Delivery Method): nasal cannula  O2 Flow (L/min): 2    CONSTITUTIONAL: NAD  HEENT: Normocephalic, atraumatic, NC  RESPIRATORY: Normal respiratory effort; lungs are clear to auscultation bilaterally  CARDIOVASCULAR: Regular rate and rhythm,  no murmur/rub/gallop  ABDOMEN: Nontender to palpation, normoactive bowel sounds  MUSCLOSKELETAL: trace b/l pedal edema, no cyanosis or clubbing. Decreased generalized ROM 2/2 pain and stiffness  PSYCH: A+O x 3; affect appropriate  NEURO: No new FND    LABS:                         7.7    12.68 )-----------( 431      ( 18 Apr 2025 06:59 )             25.2     04-18    133[L]  |  98  |  8   ----------------------------<  85  4.2   |  24  |  0.54    Ca    8.4      18 Apr 2025 06:56  Phos  3.2     04-17  Mg     1.9     04-17    TPro  5.9[L]  /  Alb  x   /  TBili  x   /  DBili  x   /  AST  x   /  ALT  x   /  AlkPhos  x   04-18      Urinalysis Basic - ( 18 Apr 2025 06:56 )    Color: x / Appearance: x / SG: x / pH: x  Gluc: 85 mg/dL / Ketone: x  / Bili: x / Urobili: x   Blood: x / Protein: x / Nitrite: x   Leuk Esterase: x / RBC: x / WBC x   Sq Epi: x / Non Sq Epi: x / Bacteria: x          Labs and imaging reviewed.

## 2025-04-18 NOTE — DIETITIAN INITIAL EVALUATION ADULT - PERTINENT LABORATORY DATA
04-18    133[L]  |  98  |  8   ----------------------------<  85  4.2   |  24  |  0.54    Ca    8.4      18 Apr 2025 06:56  Phos  3.2     04-17  Mg     1.9     04-17    TPro  5.9[L]  /  Alb  x   /  TBili  x   /  DBili  x   /  AST  x   /  ALT  x   /  AlkPhos  x   04-18  A1C with Estimated Average Glucose Result: 5.1 % (04-17-25 @ 07:17)

## 2025-04-18 NOTE — DIETITIAN INITIAL EVALUATION ADULT - PERTINENT MEDS FT
MEDICATIONS  (STANDING):  colchicine 0.6 milliGRAM(s) Oral two times a day  dextrose 5%. 1000 milliLiter(s) (100 mL/Hr) IV Continuous <Continuous>  dextrose 5%. 1000 milliLiter(s) (50 mL/Hr) IV Continuous <Continuous>  dextrose 50% Injectable 25 Gram(s) IV Push once  dextrose 50% Injectable 12.5 Gram(s) IV Push once  dextrose 50% Injectable 25 Gram(s) IV Push once  dextrose Oral Gel 15 Gram(s) Oral once  flecainide 50 milliGRAM(s) Oral every 12 hours  gabapentin 300 milliGRAM(s) Oral at bedtime  glucagon  Injectable 1 milliGRAM(s) IntraMuscular once  heparin   Injectable 5000 Unit(s) SubCutaneous every 8 hours  influenza   Vaccine 0.5 milliLiter(s) IntraMuscular once  metoprolol succinate ER 50 milliGRAM(s) Oral daily    MEDICATIONS  (PRN):  acetaminophen     Tablet .. 650 milliGRAM(s) Oral every 6 hours PRN Temp greater or equal to 38C (100.4F), Mild Pain (1 - 3)

## 2025-04-18 NOTE — DIETITIAN INITIAL EVALUATION ADULT - OTHER INFO
Pertinent History:   Per H&P, hx of Odynophagia Dysphagia noted     Pertinent Nutrition Related Labs:  - Na 133 (L). Hyponatremia noted during admission  - Hyperglycemia noted 4/17, now resolved. HbA1c 5.1% (4/17), WNL. No hx of DM noted    Pertinent Medications:   Reviewed.

## 2025-04-18 NOTE — DIETITIAN INITIAL EVALUATION ADULT - ORAL INTAKE PTA/DIET HISTORY
Attempted to visit patient multiple times. Patient not in room during visits. Unable to obtain nutrition history at this time.

## 2025-04-19 ENCOUNTER — RESULT REVIEW (OUTPATIENT)
Age: 65
End: 2025-04-19

## 2025-04-19 DIAGNOSIS — D75.839 THROMBOCYTOSIS, UNSPECIFIED: ICD-10-CM

## 2025-04-19 LAB
ALBUMIN SERPL ELPH-MCNC: 2.1 G/DL — LOW (ref 3.3–5)
ALP SERPL-CCNC: 79 U/L — SIGNIFICANT CHANGE UP (ref 40–120)
ALT FLD-CCNC: 17 U/L — SIGNIFICANT CHANGE UP (ref 10–45)
ANION GAP SERPL CALC-SCNC: 12 MMOL/L — SIGNIFICANT CHANGE UP (ref 5–17)
AST SERPL-CCNC: 22 U/L — SIGNIFICANT CHANGE UP (ref 10–40)
BILIRUB SERPL-MCNC: 0.2 MG/DL — SIGNIFICANT CHANGE UP (ref 0.2–1.2)
BUN SERPL-MCNC: 11 MG/DL — SIGNIFICANT CHANGE UP (ref 7–23)
CALCIUM SERPL-MCNC: 8.5 MG/DL — SIGNIFICANT CHANGE UP (ref 8.4–10.5)
CHLORIDE SERPL-SCNC: 96 MMOL/L — SIGNIFICANT CHANGE UP (ref 96–108)
CO2 SERPL-SCNC: 23 MMOL/L — SIGNIFICANT CHANGE UP (ref 22–31)
CREAT SERPL-MCNC: 0.53 MG/DL — SIGNIFICANT CHANGE UP (ref 0.5–1.3)
EGFR: 104 ML/MIN/1.73M2 — SIGNIFICANT CHANGE UP
EGFR: 104 ML/MIN/1.73M2 — SIGNIFICANT CHANGE UP
GLUCOSE SERPL-MCNC: 104 MG/DL — HIGH (ref 70–99)
HCT VFR BLD CALC: 29.2 % — LOW (ref 34.5–45)
HGB BLD-MCNC: 8.9 G/DL — LOW (ref 11.5–15.5)
MCHC RBC-ENTMCNC: 22.3 PG — LOW (ref 27–34)
MCHC RBC-ENTMCNC: 30.5 G/DL — LOW (ref 32–36)
MCV RBC AUTO: 73.2 FL — LOW (ref 80–100)
NRBC BLD AUTO-RTO: 0 /100 WBCS — SIGNIFICANT CHANGE UP (ref 0–0)
PLATELET # BLD AUTO: 455 K/UL — HIGH (ref 150–400)
POTASSIUM SERPL-MCNC: 4.6 MMOL/L — SIGNIFICANT CHANGE UP (ref 3.5–5.3)
POTASSIUM SERPL-SCNC: 4.6 MMOL/L — SIGNIFICANT CHANGE UP (ref 3.5–5.3)
PROT SERPL-MCNC: 6.3 G/DL — SIGNIFICANT CHANGE UP (ref 6–8.3)
RBC # BLD: 3.99 M/UL — SIGNIFICANT CHANGE UP (ref 3.8–5.2)
RBC # FLD: 21.6 % — HIGH (ref 10.3–14.5)
SODIUM SERPL-SCNC: 131 MMOL/L — LOW (ref 135–145)
WBC # BLD: 11.1 K/UL — HIGH (ref 3.8–10.5)
WBC # FLD AUTO: 11.1 K/UL — HIGH (ref 3.8–10.5)

## 2025-04-19 PROCEDURE — 93308 TTE F-UP OR LMTD: CPT | Mod: 26

## 2025-04-19 PROCEDURE — 99233 SBSQ HOSP IP/OBS HIGH 50: CPT

## 2025-04-19 RX ADMIN — COLCHICINE 0.6 MILLIGRAM(S): 0.6 TABLET, FILM COATED ORAL at 17:16

## 2025-04-19 RX ADMIN — METHYLPREDNISOLONE ACETATE 40 MILLIGRAM(S): 80 INJECTION, SUSPENSION INTRA-ARTICULAR; INTRALESIONAL; INTRAMUSCULAR; SOFT TISSUE at 05:19

## 2025-04-19 RX ADMIN — Medication 50 MILLIGRAM(S): at 05:19

## 2025-04-19 RX ADMIN — COLCHICINE 0.6 MILLIGRAM(S): 0.6 TABLET, FILM COATED ORAL at 05:20

## 2025-04-19 RX ADMIN — METOPROLOL SUCCINATE 50 MILLIGRAM(S): 50 TABLET, EXTENDED RELEASE ORAL at 05:20

## 2025-04-19 RX ADMIN — HEPARIN SODIUM 5000 UNIT(S): 1000 INJECTION INTRAVENOUS; SUBCUTANEOUS at 21:43

## 2025-04-19 RX ADMIN — Medication 50 MILLIGRAM(S): at 17:16

## 2025-04-19 RX ADMIN — HEPARIN SODIUM 5000 UNIT(S): 1000 INJECTION INTRAVENOUS; SUBCUTANEOUS at 16:32

## 2025-04-19 RX ADMIN — GABAPENTIN 300 MILLIGRAM(S): 400 CAPSULE ORAL at 21:43

## 2025-04-19 RX ADMIN — HEPARIN SODIUM 5000 UNIT(S): 1000 INJECTION INTRAVENOUS; SUBCUTANEOUS at 05:19

## 2025-04-19 NOTE — PROGRESS NOTE ADULT - SUBJECTIVE AND OBJECTIVE BOX
DATE OF SERVICE: 04-19-25 @ 10:50    Patient is a 64y old  Female who presents with a chief complaint of Palpitations (18 Apr 2025 14:40)      INTERVAL HISTORY: NAD    REVIEW OF SYSTEMS:  CONSTITUTIONAL: No weakness  EYES/ENT: No visual changes;  No throat pain   NECK: No pain or stiffness  RESPIRATORY: No cough, wheezing; No shortness of breath  CARDIOVASCULAR: No chest pain or palpitations  GASTROINTESTINAL: No abdominal  pain. No nausea, vomiting, or hematemesis  GENITOURINARY: No dysuria, frequency or hematuria  NEUROLOGICAL: No stroke like symptoms  SKIN: No rashes  	  MEDICATIONS:  flecainide 50 milliGRAM(s) Oral every 12 hours  metoprolol succinate ER 50 milliGRAM(s) Oral daily        PHYSICAL EXAM:  T(C): 36.9 (04-19-25 @ 04:41), Max: 37.2 (04-18-25 @ 20:20)  HR: 73 (04-19-25 @ 04:41) (73 - 80)  BP: 134/81 (04-19-25 @ 04:41) (129/80 - 145/85)  RR: 18 (04-19-25 @ 04:41) (18 - 18)  SpO2: 97% (04-19-25 @ 04:41) (95% - 98%)  Wt(kg): --  I&O's Summary    18 Apr 2025 07:01  -  19 Apr 2025 07:00  --------------------------------------------------------  IN: 220 mL / OUT: 1050 mL / NET: -830 mL          Appearance: In no distress	  HEENT:    PERRL, EOMI	  Cardiovascular:  S1 S2, No JVD  Respiratory: Lungs clear to auscultation	  Gastrointestinal:  Soft, Non-tender, + BS	  Vascularature:  No edema of LE  Psychiatric: Appropriate affect   Neuro: no acute focal deficits                               8.9    11.10 )-----------( 455      ( 19 Apr 2025 07:42 )             29.2     04-19    131[L]  |  96  |  11  ----------------------------<  104[H]  4.6   |  23  |  0.53    Ca    8.5      19 Apr 2025 07:39    TPro  6.3  /  Alb  2.1[L]  /  TBili  0.2  /  DBili  x   /  AST  22  /  ALT  17  /  AlkPhos  79  04-19        Labs personally reviewed      ASSESSMENT/PLAN: 	    64F with hx HTN, MAURICIO previously on IV iron, recent admission for odynophagia/dysphagia, elevated inflammatory markers presents with palpitations and chest pain.    1. Tachyarrhythmia - pSVT  - EP consulted for rythym control strategy eval   - Per EP - ECGs consistent with SVT with a pathway  - ?precipitated by viral pericarditis   - 4/16 EP started pt on Metoprolol 50mg QD and Flecainide 50mg q12 hrs; pt has been in SR since then  - Continue tele monitoring     2. Chest pain  - Recent NST (4/14/25) normal myocardial perfusion  - TTE 4/17/25 - preserved EF, small to mod pericardial effusion, with no evidence of tamponade, thickened pericardium, b/l pleural effusion (compared to small effusion 3/11/25)  - ProBNP elevated 2944 (compared to 318 last month)   - 4/17 started Colchicine 0.6mg BID for likely pericarditis with TTE findings and pleuritic chest pain  - Repeat echo to assess for progression of pericardial effusion      3. Systemic inflammatory response syndrome (SIRS).   ·  Plan: Pt with leukocytosis and febrile episode 101 in ED  - Viral panel negative, UA negative for UTI, blood cx NGTD  - CT chest negative for pna   - LE dopplers negative for DVT   - ID following, less likely infectious suspect autoimmune. Monitor off abx.   - Rheumatology consulted - undergoing autoimmune w/u        HARJINDER Aguilar DO [facility]  Cardiovascular Medicine  [address], [address]  Office: [phone]  Available via call/text on [employer] Teams  DATE OF SERVICE: 04-19-25 @ 10:50    Patient is a 64y old  Female who presents with a chief complaint of Palpitations (18 Apr 2025 14:40)      INTERVAL HISTORY: NAD    REVIEW OF SYSTEMS:  CONSTITUTIONAL: No weakness  EYES/ENT: No visual changes;  No throat pain   NECK: No pain or stiffness  RESPIRATORY: No cough, wheezing; No shortness of breath  CARDIOVASCULAR: No chest pain or palpitations  GASTROINTESTINAL: No abdominal  pain. No nausea, vomiting, or hematemesis  GENITOURINARY: No dysuria, frequency or hematuria  NEUROLOGICAL: No stroke like symptoms  SKIN: No rashes  	  MEDICATIONS:  flecainide 50 milliGRAM(s) Oral every 12 hours  metoprolol succinate ER 50 milliGRAM(s) Oral daily        PHYSICAL EXAM:  T(C): 36.9 (04-19-25 @ 04:41), Max: 37.2 (04-18-25 @ 20:20)  HR: 73 (04-19-25 @ 04:41) (73 - 80)  BP: 134/81 (04-19-25 @ 04:41) (129/80 - 145/85)  RR: 18 (04-19-25 @ 04:41) (18 - 18)  SpO2: 97% (04-19-25 @ 04:41) (95% - 98%)  Wt(kg): --  I&O's Summary    18 Apr 2025 07:01  -  19 Apr 2025 07:00  --------------------------------------------------------  IN: 220 mL / OUT: 1050 mL / NET: -830 mL          Appearance: In no distress	  HEENT:    PERRL, EOMI	  Cardiovascular:  S1 S2, No JVD  Respiratory: Lungs clear to auscultation	  Gastrointestinal:  Soft, Non-tender, + BS	  Vascularature:  No edema of LE  Psychiatric: Appropriate affect   Neuro: no acute focal deficits                               8.9    11.10 )-----------( 455      ( 19 Apr 2025 07:42 )             29.2     04-19    131[L]  |  96  |  11  ----------------------------<  104[H]  4.6   |  23  |  0.53    Ca    8.5      19 Apr 2025 07:39    TPro  6.3  /  Alb  2.1[L]  /  TBili  0.2  /  DBili  x   /  AST  22  /  ALT  17  /  AlkPhos  79  04-19        Labs personally reviewed      ASSESSMENT/PLAN: 	    64F with hx HTN, MAURCIIO previously on IV iron, recent admission for odynophagia/dysphagia, elevated inflammatory markers presents with palpitations and chest pain.    1. Tachyarrhythmia - pSVT  - EP consulted for rythym control strategy eval   - Per EP - ECGs consistent with SVT with a pathway  - ?precipitated by viral pericarditis   - 4/16 EP started pt on Metoprolol 50mg QD and Flecainide 50mg q12 hrs; pt has been in SR since then  - Continue tele monitoring     2. Chest pain  - Recent NST (4/14/25) normal myocardial perfusion  - TTE 4/17/25 - preserved EF, small to mod pericardial effusion, with no evidence of tamponade, thickened pericardium, b/l pleural effusion (compared to small effusion 3/11/25)  - ProBNP elevated 2944 (compared to 318 last month)   - 4/17 started Colchicine 0.6mg BID for likely pericarditis with TTE findings and pleuritic chest pain  - Repeat echo to assess for progression of pericardial effusion 4/19 noted with stable effusion    3. Systemic inflammatory response syndrome (SIRS).   ·  Plan: Pt with leukocytosis and febrile episode 101 in ED  - Viral panel negative, UA negative for UTI, blood cx NGTD  - CT chest negative for pna   - LE dopplers negative for DVT   - ID following, less likely infectious suspect autoimmune. Monitor off abx.   - Rheumatology consulted - undergoing autoimmune w/u      OP FU with Dr Lyman and HARJINDER Khalil DO [facility]  Cardiovascular Medicine  [address] [place] [address], [address]  Office: [phone]  Available via call/text on Microsoft Teams

## 2025-04-19 NOTE — PROGRESS NOTE ADULT - SUBJECTIVE AND OBJECTIVE BOX
Cortney Holden DO  Division of Hospital Medicine  Available on MS Teams  PROGRESS NOTE:     Patient is a 64y old  Female who presents with a chief complaint of Palpitations (17 Apr 2025 14:26)      SUBJECTIVE / OVERNIGHT EVENTS:  difficulty/pain with swallowing as well as generalized body aches and stiffness have slightly improved. Episodic chest pain with deep inspiration persists. Denies any dizziness, palpitations or SOB.     ADDITIONAL REVIEW OF SYSTEMS:  Negative unless specified above.     MEDICATIONS  (STANDING):  albuterol    90 MICROgram(s) HFA Inhaler 2 Puff(s) Inhalation every 6 hours  colchicine 0.6 milliGRAM(s) Oral two times a day  dextrose 5%. 1000 milliLiter(s) (100 mL/Hr) IV Continuous <Continuous>  dextrose 5%. 1000 milliLiter(s) (50 mL/Hr) IV Continuous <Continuous>  dextrose 50% Injectable 25 Gram(s) IV Push once  dextrose 50% Injectable 12.5 Gram(s) IV Push once  dextrose 50% Injectable 25 Gram(s) IV Push once  dextrose Oral Gel 15 Gram(s) Oral once  flecainide 50 milliGRAM(s) Oral every 12 hours  gabapentin 300 milliGRAM(s) Oral at bedtime  glucagon  Injectable 1 milliGRAM(s) IntraMuscular once  heparin   Injectable 5000 Unit(s) SubCutaneous every 8 hours  influenza   Vaccine 0.5 milliLiter(s) IntraMuscular once  methylPREDNISolone sodium succinate Injectable 40 milliGRAM(s) IV Push daily  metoprolol succinate ER 50 milliGRAM(s) Oral daily  pantoprazole    Tablet 40 milliGRAM(s) Oral before breakfast    MEDICATIONS  (PRN):  acetaminophen     Tablet .. 650 milliGRAM(s) Oral every 6 hours PRN Temp greater or equal to 38C (100.4F), Mild Pain (1 - 3)  albuterol/ipratropium for Nebulization 3 milliLiter(s) Nebulizer every 6 hours PRN Bronchospasm    I&O's Summary    18 Apr 2025 07:01  -  19 Apr 2025 07:00  --------------------------------------------------------  IN: 220 mL / OUT: 1050 mL / NET: -830 mL          PHYSICAL EXAM:  Vital Signs Last 24 Hrs  T(C): 36.8 (19 Apr 2025 12:18), Max: 37.2 (18 Apr 2025 20:20)  T(F): 98.3 (19 Apr 2025 12:18), Max: 99 (18 Apr 2025 20:20)  HR: 73 (19 Apr 2025 12:18) (73 - 80)  BP: 129/82 (19 Apr 2025 12:18) (129/80 - 134/81)  BP(mean): --  RR: 18 (19 Apr 2025 12:18) (18 - 18)  SpO2: 98% (19 Apr 2025 12:18) (95% - 98%)    Parameters below as of 19 Apr 2025 12:18  Patient On (Oxygen Delivery Method): room air      CONSTITUTIONAL: NAD  HEENT: Normocephalic, atraumatic  RESPIRATORY: Normal respiratory effort; lungs are clear to auscultation bilaterally  CARDIOVASCULAR: Regular rate and rhythm,  no murmur/rub/gallop  ABDOMEN: Nontender to palpation, normoactive bowel sounds  MUSCLOSKELETAL: No LE edema, no cyanosis or clubbing. Decreased generalized ROM 2/2 pain and stiffness  PSYCH: A+O x 3; affect appropriate  NEURO: No new FND    LABS:                         8.9    11.10 )-----------( 455      ( 19 Apr 2025 07:42 )             29.2     04-19    131[L]  |  96  |  11  ----------------------------<  104[H]  4.6   |  23  |  0.53    Ca    8.5      19 Apr 2025 07:39    TPro  6.3  /  Alb  2.1[L]  /  TBili  0.2  /  DBili  x   /  AST  22  /  ALT  17  /  AlkPhos  79  04-19      Urinalysis Basic - ( 19 Apr 2025 07:39 )    Color: x / Appearance: x / SG: x / pH: x  Gluc: 104 mg/dL / Ketone: x  / Bili: x / Urobili: x   Blood: x / Protein: x / Nitrite: x   Leuk Esterase: x / RBC: x / WBC x   Sq Epi: x / Non Sq Epi: x / Bacteria: x      Labs reviewed.

## 2025-04-20 LAB
ANION GAP SERPL CALC-SCNC: 12 MMOL/L — SIGNIFICANT CHANGE UP (ref 5–17)
BUN SERPL-MCNC: 18 MG/DL — SIGNIFICANT CHANGE UP (ref 7–23)
CALCIUM SERPL-MCNC: 8.6 MG/DL — SIGNIFICANT CHANGE UP (ref 8.4–10.5)
CHLORIDE SERPL-SCNC: 100 MMOL/L — SIGNIFICANT CHANGE UP (ref 96–108)
CO2 SERPL-SCNC: 24 MMOL/L — SIGNIFICANT CHANGE UP (ref 22–31)
CREAT SERPL-MCNC: 0.64 MG/DL — SIGNIFICANT CHANGE UP (ref 0.5–1.3)
EGFR: 99 ML/MIN/1.73M2 — SIGNIFICANT CHANGE UP
EGFR: 99 ML/MIN/1.73M2 — SIGNIFICANT CHANGE UP
GLUCOSE SERPL-MCNC: 76 MG/DL — SIGNIFICANT CHANGE UP (ref 70–99)
HCT VFR BLD CALC: 26.7 % — LOW (ref 34.5–45)
HGB BLD-MCNC: 8.1 G/DL — LOW (ref 11.5–15.5)
MCHC RBC-ENTMCNC: 22.3 PG — LOW (ref 27–34)
MCHC RBC-ENTMCNC: 30.3 G/DL — LOW (ref 32–36)
MCV RBC AUTO: 73.6 FL — LOW (ref 80–100)
NRBC BLD AUTO-RTO: 0 /100 WBCS — SIGNIFICANT CHANGE UP (ref 0–0)
PLATELET # BLD AUTO: 424 K/UL — HIGH (ref 150–400)
POTASSIUM SERPL-MCNC: 4.6 MMOL/L — SIGNIFICANT CHANGE UP (ref 3.5–5.3)
POTASSIUM SERPL-SCNC: 4.6 MMOL/L — SIGNIFICANT CHANGE UP (ref 3.5–5.3)
RBC # BLD: 3.63 M/UL — LOW (ref 3.8–5.2)
RBC # FLD: 21.4 % — HIGH (ref 10.3–14.5)
SODIUM SERPL-SCNC: 136 MMOL/L — SIGNIFICANT CHANGE UP (ref 135–145)
WBC # BLD: 13.91 K/UL — HIGH (ref 3.8–10.5)
WBC # FLD AUTO: 13.91 K/UL — HIGH (ref 3.8–10.5)

## 2025-04-20 PROCEDURE — 99233 SBSQ HOSP IP/OBS HIGH 50: CPT

## 2025-04-20 RX ADMIN — COLCHICINE 0.6 MILLIGRAM(S): 0.6 TABLET, FILM COATED ORAL at 05:42

## 2025-04-20 RX ADMIN — COLCHICINE 0.6 MILLIGRAM(S): 0.6 TABLET, FILM COATED ORAL at 17:08

## 2025-04-20 RX ADMIN — HEPARIN SODIUM 5000 UNIT(S): 1000 INJECTION INTRAVENOUS; SUBCUTANEOUS at 21:17

## 2025-04-20 RX ADMIN — GABAPENTIN 300 MILLIGRAM(S): 400 CAPSULE ORAL at 21:17

## 2025-04-20 RX ADMIN — METOPROLOL SUCCINATE 50 MILLIGRAM(S): 50 TABLET, EXTENDED RELEASE ORAL at 05:42

## 2025-04-20 RX ADMIN — HEPARIN SODIUM 5000 UNIT(S): 1000 INJECTION INTRAVENOUS; SUBCUTANEOUS at 14:06

## 2025-04-20 RX ADMIN — Medication 50 MILLIGRAM(S): at 17:08

## 2025-04-20 RX ADMIN — HEPARIN SODIUM 5000 UNIT(S): 1000 INJECTION INTRAVENOUS; SUBCUTANEOUS at 05:43

## 2025-04-20 RX ADMIN — Medication 50 MILLIGRAM(S): at 05:42

## 2025-04-20 RX ADMIN — METHYLPREDNISOLONE ACETATE 40 MILLIGRAM(S): 80 INJECTION, SUSPENSION INTRA-ARTICULAR; INTRALESIONAL; INTRAMUSCULAR; SOFT TISSUE at 05:42

## 2025-04-20 NOTE — PROGRESS NOTE ADULT - SUBJECTIVE AND OBJECTIVE BOX
Cortney Holden DO  Division of Hospital Medicine  Available on MS Teams  PROGRESS NOTE:     Patient is a 64y old  Female who presents with a chief complaint of Palpitations (17 Apr 2025 14:26)      SUBJECTIVE / OVERNIGHT EVENTS:  Generalized body stiffness and swelling have improved, able to ambulate with walker.     ADDITIONAL REVIEW OF SYSTEMS:  Negative unless specified above.     MEDICATIONS  (STANDING):  albuterol    90 MICROgram(s) HFA Inhaler 2 Puff(s) Inhalation every 6 hours  colchicine 0.6 milliGRAM(s) Oral two times a day  dextrose 5%. 1000 milliLiter(s) (100 mL/Hr) IV Continuous <Continuous>  dextrose 5%. 1000 milliLiter(s) (50 mL/Hr) IV Continuous <Continuous>  dextrose 50% Injectable 25 Gram(s) IV Push once  dextrose 50% Injectable 12.5 Gram(s) IV Push once  dextrose 50% Injectable 25 Gram(s) IV Push once  dextrose Oral Gel 15 Gram(s) Oral once  flecainide 50 milliGRAM(s) Oral every 12 hours  gabapentin 300 milliGRAM(s) Oral at bedtime  glucagon  Injectable 1 milliGRAM(s) IntraMuscular once  heparin   Injectable 5000 Unit(s) SubCutaneous every 8 hours  influenza   Vaccine 0.5 milliLiter(s) IntraMuscular once  methylPREDNISolone sodium succinate Injectable 40 milliGRAM(s) IV Push daily  metoprolol succinate ER 50 milliGRAM(s) Oral daily  pantoprazole    Tablet 40 milliGRAM(s) Oral before breakfast    MEDICATIONS  (PRN):  acetaminophen     Tablet .. 650 milliGRAM(s) Oral every 6 hours PRN Temp greater or equal to 38C (100.4F), Mild Pain (1 - 3)  albuterol/ipratropium for Nebulization 3 milliLiter(s) Nebulizer every 6 hours PRN Bronchospasm      I&O's Summary    19 Apr 2025 07:01  -  20 Apr 2025 07:00  --------------------------------------------------------  IN: 270 mL / OUT: 0 mL / NET: 270 mL          PHYSICAL EXAM:  Vital Signs Last 24 Hrs  T(C): 36.6 (20 Apr 2025 04:45), Max: 36.6 (19 Apr 2025 20:07)  T(F): 97.8 (20 Apr 2025 04:45), Max: 97.8 (19 Apr 2025 20:07)  HR: 77 (20 Apr 2025 04:45) (76 - 77)  BP: 125/74 (20 Apr 2025 04:45) (125/74 - 147/77)  BP(mean): --  RR: 18 (20 Apr 2025 04:45) (18 - 18)  SpO2: 94% (20 Apr 2025 04:45) (94% - 95%)    Parameters below as of 20 Apr 2025 04:45  Patient On (Oxygen Delivery Method): room air        CONSTITUTIONAL: NAD  HEENT: Normocephalic, atraumatic  RESPIRATORY: Normal respiratory effort; lungs are clear to auscultation bilaterally  CARDIOVASCULAR: Regular rate and rhythm,  no murmur/rub/gallop  ABDOMEN: Nontender to palpation, normoactive bowel sounds  MUSCLOSKELETAL: No LE edema, no cyanosis or clubbing. Decreased generalized ROM 2/2 pain and stiffness (improving)  PSYCH: A+O x 3; affect appropriate  NEURO: No new FND    LABS:                         8.1    13.91 )-----------( 424      ( 20 Apr 2025 07:02 )             26.7     04-20    136  |  100  |  18  ----------------------------<  76  4.6   |  24  |  0.64    Ca    8.6      20 Apr 2025 07:02    TPro  6.3  /  Alb  2.1[L]  /  TBili  0.2  /  DBili  x   /  AST  22  /  ALT  17  /  AlkPhos  79  04-19      Urinalysis Basic - ( 20 Apr 2025 07:02 )    Color: x / Appearance: x / SG: x / pH: x  Gluc: 76 mg/dL / Ketone: x  / Bili: x / Urobili: x   Blood: x / Protein: x / Nitrite: x   Leuk Esterase: x / RBC: x / WBC x   Sq Epi: x / Non Sq Epi: x / Bacteria: x        Labs reviewed.

## 2025-04-20 NOTE — PROGRESS NOTE ADULT - SUBJECTIVE AND OBJECTIVE BOX
DATE OF SERVICE: 04-20-25 @ 15:31    Patient is a 64y old  Female who presents with a chief complaint of Palpitations (20 Apr 2025 14:00)      INTERVAL HISTORY: NAD     REVIEW OF SYSTEMS:  CONSTITUTIONAL: No weakness  EYES/ENT: No visual changes;  No throat pain   NECK: No pain or stiffness  RESPIRATORY: No cough, wheezing; No shortness of breath  CARDIOVASCULAR: No chest pain or palpitations  GASTROINTESTINAL: No abdominal  pain. No nausea, vomiting, or hematemesis  GENITOURINARY: No dysuria, frequency or hematuria  NEUROLOGICAL: No stroke like symptoms  SKIN: No rashes     	  MEDICATIONS:  flecainide 50 milliGRAM(s) Oral every 12 hours  metoprolol succinate ER 50 milliGRAM(s) Oral daily        PHYSICAL EXAM:  T(C): 36.6 (04-20-25 @ 13:00), Max: 36.6 (04-19-25 @ 20:07)  HR: 67 (04-20-25 @ 13:00) (67 - 77)  BP: 137/81 (04-20-25 @ 13:00) (125/74 - 147/77)  RR: 18 (04-20-25 @ 13:00) (18 - 18)  SpO2: 96% (04-20-25 @ 13:00) (94% - 96%)  Wt(kg): --  I&O's Summary    19 Apr 2025 07:01  -  20 Apr 2025 07:00  --------------------------------------------------------  IN: 270 mL / OUT: 0 mL / NET: 270 mL    20 Apr 2025 07:01  -  20 Apr 2025 15:31  --------------------------------------------------------  IN: 0 mL / OUT: 1100 mL / NET: -1100 mL          Appearance: In no distress	  HEENT:    PERRL, EOMI	  Cardiovascular:  S1 S2, No JVD  Respiratory: Lungs clear to auscultation	  Gastrointestinal:  Soft, Non-tender, + BS	  Vascularature:  No edema of LE  Psychiatric: Appropriate affect   Neuro: no acute focal deficits                               8.1    13.91 )-----------( 424      ( 20 Apr 2025 07:02 )             26.7     04-20    136  |  100  |  18  ----------------------------<  76  4.6   |  24  |  0.64    Ca    8.6      20 Apr 2025 07:02    TPro  6.3  /  Alb  2.1[L]  /  TBili  0.2  /  DBili  x   /  AST  22  /  ALT  17  /  AlkPhos  79  04-19        Labs personally reviewed      ASSESSMENT/PLAN: 	  64F with hx HTN, MAURICIO previously on IV iron, recent admission for odynophagia/dysphagia, elevated inflammatory markers presents with palpitations and chest pain.    1. Tachyarrhythmia - pSVT  - EP consulted for rythym control strategy eval   - Per EP - ECGs consistent with SVT with a pathway  - ?precipitated by viral pericarditis   - 4/16 EP started pt on Metoprolol 50mg QD and Flecainide 50mg q12 hrs; pt has been in SR since then  - Continue tele monitoring     2. Chest pain  - Recent NST (4/14/25) normal myocardial perfusion  - TTE 4/17/25 - preserved EF, small to mod pericardial effusion, with no evidence of tamponade, thickened pericardium, b/l pleural effusion (compared to small effusion 3/11/25)  - ProBNP elevated 2944 (compared to 318 last month)   - 4/17 started Colchicine 0.6mg BID for likely pericarditis with TTE findings and pleuritic chest pain  - Repeat echo to assess for progression of pericardial effusion 4/19 noted with stable effusion    3. Systemic inflammatory response syndrome (SIRS).   ·  Plan: Pt with leukocytosis and febrile episode 101 in ED  - Viral panel negative, UA negative for UTI, blood cx NGTD  - CT chest negative for pna   - LE dopplers negative for DVT   - ID following, less likely infectious suspect autoimmune. Monitor off abx.   - Rheumatology consulted - undergoing autoimmune w/u      OP FU with Dr Lyman and HARJINDER Khalil DO [facility]  Cardiovascular Medicine  [address] [place] [address], [address]  Office: [phone]  Available via call/text on [employer] Teams  DATE OF SERVICE: 04-20-25 @ 15:31    Patient is a 64y old  Female who presents with a chief complaint of Palpitations (20 Apr 2025 14:00)      INTERVAL HISTORY: NAD     REVIEW OF SYSTEMS:  CONSTITUTIONAL: No weakness  EYES/ENT: No visual changes;  No throat pain   NECK: No pain or stiffness  RESPIRATORY: No cough, wheezing; No shortness of breath  CARDIOVASCULAR: No chest pain or palpitations  GASTROINTESTINAL: No abdominal  pain. No nausea, vomiting, or hematemesis  GENITOURINARY: No dysuria, frequency or hematuria  NEUROLOGICAL: No stroke like symptoms  SKIN: No rashes     	  MEDICATIONS:  flecainide 50 milliGRAM(s) Oral every 12 hours  metoprolol succinate ER 50 milliGRAM(s) Oral daily        PHYSICAL EXAM:  T(C): 36.6 (04-20-25 @ 13:00), Max: 36.6 (04-19-25 @ 20:07)  HR: 67 (04-20-25 @ 13:00) (67 - 77)  BP: 137/81 (04-20-25 @ 13:00) (125/74 - 147/77)  RR: 18 (04-20-25 @ 13:00) (18 - 18)  SpO2: 96% (04-20-25 @ 13:00) (94% - 96%)  Wt(kg): --  I&O's Summary    19 Apr 2025 07:01  -  20 Apr 2025 07:00  --------------------------------------------------------  IN: 270 mL / OUT: 0 mL / NET: 270 mL    20 Apr 2025 07:01  -  20 Apr 2025 15:31  --------------------------------------------------------  IN: 0 mL / OUT: 1100 mL / NET: -1100 mL          Appearance: In no distress	  HEENT:    PERRL, EOMI	  Cardiovascular:  S1 S2, No JVD  Respiratory: Lungs clear to auscultation	  Gastrointestinal:  Soft, Non-tender, + BS	  Vascularature:  No edema of LE  Psychiatric: Appropriate affect   Neuro: no acute focal deficits                               8.1    13.91 )-----------( 424      ( 20 Apr 2025 07:02 )             26.7     04-20    136  |  100  |  18  ----------------------------<  76  4.6   |  24  |  0.64    Ca    8.6      20 Apr 2025 07:02    TPro  6.3  /  Alb  2.1[L]  /  TBili  0.2  /  DBili  x   /  AST  22  /  ALT  17  /  AlkPhos  79  04-19        Labs personally reviewed      ASSESSMENT/PLAN: 	  64F with hx HTN, MAURICIO previously on IV iron, recent admission for odynophagia/dysphagia, elevated inflammatory markers presents with palpitations and chest pain.    1. Tachyarrhythmia - pSVT  - EP consulted for rythym control strategy eval   - Per EP - ECGs consistent with SVT with a pathway  - ?precipitated by viral pericarditis   - 4/16 EP started pt on Metoprolol 50mg QD and Flecainide 50mg q12 hrs; pt has been in SR since then  - Continue tele monitoring     2. Chest pain  - Recent NST (4/14/25) normal myocardial perfusion  - TTE 4/17/25 - preserved EF, small to mod pericardial effusion, with no evidence of tamponade, thickened pericardium, b/l pleural effusion (compared to small effusion 3/11/25)  - ProBNP elevated 2944 (compared to 318 last month)   - 4/17 started Colchicine 0.6mg BID for likely pericarditis with TTE findings and pleuritic chest pain  - Repeat echo to assess for progression of pericardial effusion 4/19 noted with stable effusion    3. Systemic inflammatory response syndrome (SIRS).   ·  Plan: Pt with leukocytosis and febrile episode 101 in ED  - Viral panel negative, UA negative for UTI, blood cx NGTD  - CT chest negative for pna   - LE dopplers negative for DVT   - ID following, less likely infectious suspect autoimmune. Monitor off abx.   - Rheumatology consulted - undergoing autoimmune w/u      OP FU with Dr Lyman and HARJINDER Khalil DO [facility]  Cardiovascular Medicine  [address] [place] [address], [address]  Office: [phone]  Available via call/text on [employer] Teams

## 2025-04-21 ENCOUNTER — NON-APPOINTMENT (OUTPATIENT)
Age: 65
End: 2025-04-21

## 2025-04-21 ENCOUNTER — TRANSCRIPTION ENCOUNTER (OUTPATIENT)
Age: 65
End: 2025-04-21

## 2025-04-21 LAB
% ALBUMIN: 33.2 % — SIGNIFICANT CHANGE UP
% ALPHA 1: 10 % — SIGNIFICANT CHANGE UP
% ALPHA 2: 19.1 % — SIGNIFICANT CHANGE UP
% BETA: 10 % — SIGNIFICANT CHANGE UP
% GAMMA: 27.7 % — SIGNIFICANT CHANGE UP
% M SPIKE: 10.3 % — SIGNIFICANT CHANGE UP
ALBUMIN SERPL ELPH-MCNC: 2 G/DL — LOW (ref 3.6–5.5)
ALBUMIN/GLOB SERPL ELPH: 0.5 RATIO — SIGNIFICANT CHANGE UP
ALPHA1 GLOB SERPL ELPH-MCNC: 0.6 G/DL — HIGH (ref 0.1–0.4)
ALPHA2 GLOB SERPL ELPH-MCNC: 1.1 G/DL — HIGH (ref 0.5–1)
ANION GAP SERPL CALC-SCNC: 11 MMOL/L — SIGNIFICANT CHANGE UP (ref 5–17)
B-GLOBULIN SERPL ELPH-MCNC: 0.6 G/DL — SIGNIFICANT CHANGE UP (ref 0.5–1)
BUN SERPL-MCNC: 19 MG/DL — SIGNIFICANT CHANGE UP (ref 7–23)
CALCIUM SERPL-MCNC: 8.5 MG/DL — SIGNIFICANT CHANGE UP (ref 8.4–10.5)
CHLORIDE SERPL-SCNC: 98 MMOL/L — SIGNIFICANT CHANGE UP (ref 96–108)
CO2 SERPL-SCNC: 25 MMOL/L — SIGNIFICANT CHANGE UP (ref 22–31)
CREAT SERPL-MCNC: 0.63 MG/DL — SIGNIFICANT CHANGE UP (ref 0.5–1.3)
CULTURE RESULTS: SIGNIFICANT CHANGE UP
CULTURE RESULTS: SIGNIFICANT CHANGE UP
EGFR: 100 ML/MIN/1.73M2 — SIGNIFICANT CHANGE UP
EGFR: 100 ML/MIN/1.73M2 — SIGNIFICANT CHANGE UP
G6PD RBC-CCNC: 31.4 U/G HGB — HIGH (ref 7–20.5)
GAMMA GLOBULIN: 1.6 G/DL — SIGNIFICANT CHANGE UP (ref 0.6–1.6)
GLUCOSE SERPL-MCNC: 71 MG/DL — SIGNIFICANT CHANGE UP (ref 70–99)
HCT VFR BLD CALC: 26.2 % — LOW (ref 34.5–45)
HGB BLD-MCNC: 7.9 G/DL — LOW (ref 11.5–15.5)
INTERPRETATION SERPL IFE-IMP: SIGNIFICANT CHANGE UP
M-SPIKE: 0.6 G/DL — HIGH (ref 0–0)
MCHC RBC-ENTMCNC: 22.3 PG — LOW (ref 27–34)
MCHC RBC-ENTMCNC: 30.2 G/DL — LOW (ref 32–36)
MCV RBC AUTO: 73.8 FL — LOW (ref 80–100)
NRBC BLD AUTO-RTO: 0 /100 WBCS — SIGNIFICANT CHANGE UP (ref 0–0)
PLATELET # BLD AUTO: 444 K/UL — HIGH (ref 150–400)
POTASSIUM SERPL-MCNC: 4.5 MMOL/L — SIGNIFICANT CHANGE UP (ref 3.5–5.3)
POTASSIUM SERPL-SCNC: 4.5 MMOL/L — SIGNIFICANT CHANGE UP (ref 3.5–5.3)
PROT PATTERN SERPL ELPH-IMP: SIGNIFICANT CHANGE UP
PROT SERPL-MCNC: 5.9 G/DL — LOW (ref 6–8.3)
RBC # BLD: 3.55 M/UL — LOW (ref 3.8–5.2)
RBC # FLD: 21.8 % — HIGH (ref 10.3–14.5)
SODIUM SERPL-SCNC: 134 MMOL/L — LOW (ref 135–145)
SPECIMEN SOURCE: SIGNIFICANT CHANGE UP
SPECIMEN SOURCE: SIGNIFICANT CHANGE UP
WBC # BLD: 14.26 K/UL — HIGH (ref 3.8–10.5)
WBC # FLD AUTO: 14.26 K/UL — HIGH (ref 3.8–10.5)

## 2025-04-21 PROCEDURE — 99232 SBSQ HOSP IP/OBS MODERATE 35: CPT | Mod: GC

## 2025-04-21 PROCEDURE — G0545: CPT

## 2025-04-21 PROCEDURE — 99232 SBSQ HOSP IP/OBS MODERATE 35: CPT

## 2025-04-21 PROCEDURE — 99233 SBSQ HOSP IP/OBS HIGH 50: CPT

## 2025-04-21 RX ORDER — PREDNISONE 20 MG/1
40 TABLET ORAL DAILY
Refills: 0 | Status: DISCONTINUED | OUTPATIENT
Start: 2025-04-22 | End: 2025-04-22

## 2025-04-21 RX ADMIN — GABAPENTIN 300 MILLIGRAM(S): 400 CAPSULE ORAL at 21:59

## 2025-04-21 RX ADMIN — Medication 50 MILLIGRAM(S): at 17:37

## 2025-04-21 RX ADMIN — METOPROLOL SUCCINATE 50 MILLIGRAM(S): 50 TABLET, EXTENDED RELEASE ORAL at 05:15

## 2025-04-21 RX ADMIN — COLCHICINE 0.6 MILLIGRAM(S): 0.6 TABLET, FILM COATED ORAL at 05:15

## 2025-04-21 RX ADMIN — HEPARIN SODIUM 5000 UNIT(S): 1000 INJECTION INTRAVENOUS; SUBCUTANEOUS at 21:59

## 2025-04-21 RX ADMIN — COLCHICINE 0.6 MILLIGRAM(S): 0.6 TABLET, FILM COATED ORAL at 17:37

## 2025-04-21 RX ADMIN — HEPARIN SODIUM 5000 UNIT(S): 1000 INJECTION INTRAVENOUS; SUBCUTANEOUS at 05:16

## 2025-04-21 RX ADMIN — METHYLPREDNISOLONE ACETATE 40 MILLIGRAM(S): 80 INJECTION, SUSPENSION INTRA-ARTICULAR; INTRALESIONAL; INTRAMUSCULAR; SOFT TISSUE at 05:16

## 2025-04-21 RX ADMIN — HEPARIN SODIUM 5000 UNIT(S): 1000 INJECTION INTRAVENOUS; SUBCUTANEOUS at 14:25

## 2025-04-21 RX ADMIN — Medication 50 MILLIGRAM(S): at 05:16

## 2025-04-21 NOTE — PROGRESS NOTE ADULT - ATTENDING COMMENTS
I personally interviewed and examined the patient. Agree with rheumatology fellow's note with my edits as above.
64 year old female with onset of migratory and additive polyarthritis over the last 4-5 months involving multiple joints including hip, knee, ankles, hands and shoulders. Also serositis pleural and pericardial, small pleural effusions and moderate pericardial effusion with SOB, tachycardia, anemia microcytic since January but high ferritin and low TIBC. High inflammatory markers Weight loss noted on previous admission. Low level proteinuria with [place] protein. M spike 0.6.  Family history of RA. Serologies show positive NORMA and high anti-Ro. Much improved on steroids.     Differential includes RA and SLE and Adult Stills disease. Incomplete features of each. Plan is to treat with steroids and follow up as outpatient for monitoring of withdrawal and need for a steroid sparing immunosuppressive agent. Can switch to oral steroids.

## 2025-04-21 NOTE — PROGRESS NOTE ADULT - PROBLEM SELECTOR PLAN 1
Pt with leukocytosis and febrile episode 101 in ED  - Viral panel negative, UA negative for UTI, blood cx NGTD  - CT chest negative for pna, LE dopplers negative for DVT   - Echo low suspicion for endocarditis   - MRI L spine negative for discitis   - ID following, less likely infectious suspect autoimmune. Monitor off abx.
Pt with leukocytosis and febrile episode 101 in ED  -Viral panel negative, UA negative for UTI, blood cx NGTD  - CT chest negative for pna   - LE dopplers negative for DVT   - Echo low suspicion for endocarditis   - CT A/P discontinued due to low yield  - ID following, less likely infectious suspect autoimmune. Monitor off abx.   - Rheumatology consulted, labs ordered per recs
Pt with leukocytosis and febrile episode 101 in ED  - Viral panel negative, UA negative for UTI, blood cx NGTD  - CT chest negative for pna, LE dopplers negative for DVT   - Echo low suspicion for endocarditis   - MRI L spine negative for discitis   - ID following, less likely infectious suspect autoimmune. Monitor off abx.
Pt with leukocytosis and febrile episode 101 in ED  -Viral panel negative, UA negative for UTI, blood cx NGTD  - CT chest negative for pna, LE dopplers negative for DVT   - Echo low suspicion for endocarditis   - MRI L spine negative for discitis   - ID following, less likely infectious suspect autoimmune. Monitor off abx.
Pt with leukocytosis and febrile episode 101 in ED  - Viral panel negative, UA negative for UTI, blood cx NGTD  - CT chest negative for pna, LE dopplers negative for DVT   - Echo low suspicion for endocarditis   - MRI L spine negative for discitis   - ID following, less likely infectious suspect autoimmune. Monitor off abx.

## 2025-04-21 NOTE — PROGRESS NOTE ADULT - PROBLEM SELECTOR PLAN 2
- Elevated Rho 59, ESR, and CRP. History of elevated NORMA.  - Rheumatology following, suspect UCTD vs PMR  - Start Solumedrol 40 mg qd  - PT recommending MARIO ALBERTO
- Elevated Anti SSA, ESR, and CRP. History of elevated NORMA.  - Rheumatology following, suspect UCTD vs PMR  - Continue Solumedrol 40 mg qd  - PT recommending MARIO ALBERTO
#SVT  - Seen by cardiology and EP  - Continue flecainide 50 mg BID and toprol 50 m qd  - Tele monitoring
- Elevated Anti SSA, ESR, and CRP. History of elevated NORMA.  - Rheumatology following, suspect UCTD vs PMR  - Continue Solumedrol 40 mg qd - dc with pred 40 upon dc per prelim rheum recs  - PT recommending MARIO ALBERTO - patient wants to go home
- Elevated Anti SSA, ESR, and CRP. History of elevated NORMA.  - Rheumatology following, suspect UCTD vs PMR  - Continue Solumedrol 40 mg qd  - PT recommending MARIO ALBERTO

## 2025-04-21 NOTE — PROGRESS NOTE ADULT - SUBJECTIVE AND OBJECTIVE BOX
STEFAN WASHINGTON  [id number]    INTERVAL HPI/OVERNIGHT EVENTS: No acute events.     MEDICATIONS  (STANDING):  albuterol    90 MICROgram(s) HFA Inhaler 2 Puff(s) Inhalation every 6 hours  colchicine 0.6 milliGRAM(s) Oral two times a day  dextrose 5%. 1000 milliLiter(s) (100 mL/Hr) IV Continuous <Continuous>  dextrose 5%. 1000 milliLiter(s) (50 mL/Hr) IV Continuous <Continuous>  dextrose 50% Injectable 25 Gram(s) IV Push once  dextrose 50% Injectable 12.5 Gram(s) IV Push once  dextrose 50% Injectable 25 Gram(s) IV Push once  dextrose Oral Gel 15 Gram(s) Oral once  flecainide 50 milliGRAM(s) Oral every 12 hours  gabapentin 300 milliGRAM(s) Oral at bedtime  glucagon  Injectable 1 milliGRAM(s) IntraMuscular once  heparin   Injectable 5000 Unit(s) SubCutaneous every 8 hours  influenza   Vaccine 0.5 milliLiter(s) IntraMuscular once  methylPREDNISolone sodium succinate Injectable 40 milliGRAM(s) IV Push daily  metoprolol succinate ER 50 milliGRAM(s) Oral daily  pantoprazole    Tablet 40 milliGRAM(s) Oral before breakfast    MEDICATIONS  (PRN):  acetaminophen     Tablet .. 650 milliGRAM(s) Oral every 6 hours PRN Temp greater or equal to 38C (100.4F), Mild Pain (1 - 3)  albuterol/ipratropium for Nebulization 3 milliLiter(s) Nebulizer every 6 hours PRN Bronchospasm      Allergies    NSAIDs (Hives)    Intolerances        Review of Systems:   General: No fevers/chills, no fatigue  HEENT: No blurry vision, dysphagia, or odynophagia  CVS: No CP/palpitations  Resp: No SOB/wheezing  GI: No N/V/C/D/abdominal pain  MSK: as above   Skin: No new rashes  Neuro: No headaches      Vital Signs Last 24 Hrs  T(C): 36.7 (2025 04:34), Max: 36.7 (2025 04:34)  T(F): 98.1 (2025 04:34), Max: 98.1 (2025 04:34)  HR: 70 (2025 04:34) (67 - 71)  BP: 154/74 (2025 04:34) (137/81 - 154/74)  BP(mean): --  RR: 18 (2025 04:34) (18 - 18)  SpO2: 95% (2025 04:34) (94% - 96%)    Parameters below as of 2025 04:34  Patient On (Oxygen Delivery Method): room air        Physical Exam:  General: alert, NAD   MSK: tenderness over  L3-4 spinal processes, no swelling/warmth/erythema of the joints of the UE/LE. proximal UE 2/5, LE hip flexors 4/5, distal strength 5/5. Point tenderness lower spine  Skin: no visible rashes    LABS:                        7.9    14.26 )-----------( 444      ( 2025 07:25 )             26.2         134[L]  |  98  |  19  ----------------------------<  71  4.5   |  25  |  0.63    Ca    8.5      2025 07:25        Urinalysis Basic - ( 2025 07:25 )    Color: x / Appearance: x / SG: x / pH: x  Gluc: 71 mg/dL / Ketone: x  / Bili: x / Urobili: x   Blood: x / Protein: x / Nitrite: x   Leuk Esterase: x / RBC: x / WBC x   Sq Epi: x / Non Sq Epi: x / Bacteria: x      Anti SS-A Antibody: >8.0 AI *H* [<  1.0 AI: Negative  >=1.0 AI: Positive  Method: Multiplexed flow immunoassay] (25 @ 06:56)  Myeloperoxidase Antibody Assay: <0.2 AI [Effective 2025, the assay methodology for anti-MPO antibodies  testing changed from enzyme-linked immunosorbent assay to multiplex flow  immunoassay. Direct comparison of semi-quantitative values between two  different assay methods is not feasible due to differences in immunoassay  design and antibody epitope recognition.] (25 @ 06:56)  Proteinase 3 Antibody Assay: <0.2 AI [Effective 2025, the assay methodology for anti-PR3 antibodies  testing changed from enzyme-linked immunosorbent assay to multiplex flow  immunoassay. Direct comparison of semi-quantitative values between two  different assay methods is not feasible due to differences in immunoassay  design and antibody epitope recognition.] (25 @ 06:56)  C3 Complement, Serum: 118 mg/dL [81 - 157] (25 @ 06:56)    SSA 52 and 60 (Ro) (EBENEZER) Antibodies, IgG (25 @ 06:41)   SSA-52 (Ro52) (EBENEZER) Antibody, Ig: INTERPRETIVE INFORMATION: SSA-52 (Ro52) (EBENEZER) Antibody, IgG   29 AU/mL or Less ............. Negative   30 - 40 AU/mL ................ Equivocal   41 AU/mL or Greater .......... Positive   SSA-52 (Ro52) and/or SSA-60 (Ro60) antibodies are associated with   a diagnosis of Sjogren syndrome, systemic lupus erythematosus   (SLE), and systemic sclerosis. SSA-52 antibody overlaps   significantly with the major SSc-related antibodies. SSA-52 (Ro52)   antibody occurs frequently in patients with inflammatory   myopathies, often in the presence of interstitial lung disease. AU/mL  SSA-60 (Ro60) (EBENEZER) Antibody, Ig: REFERENCE INTERVAL: SSA-60 (Ro60) (EBENEZER) Antibody, IgG   29 AU/mL or Less ............. Negative   30 - 40 AU/mL ................ Equivocal   41 AU/mL or Greater .......... Positive   Performed By: [facility]   [address] [place] [id number]   : Mulugeta Sellers MD, PhD   CLIA Number: [id number] AU/mL            RADIOLOGY & ADDITIONAL TESTS:    < from: MR Lumbar Spine w/wo IV Cont (25 @ 11:20) >  IMPRESSION: No evidence of osteomyelitis or discitis. No drainable   collections.    < end of copied text >    < from: CT Angio Chest PE Protocol w/ IV Cont (25 @ 06:21) >  IMPRESSION:    No pulmonary embolism within the confines of this exam.    Small bilateral pleural effusions and bibasilar atelectasis.    Cardiomegaly. Moderate sized pericardial effusion compared with prior   exam from 3/7/2025.    < end of copied text >    < from: TTE Limited W or WO Ultrasound Enhancing Agent (. @ 14:18) >  CONCLUSIONS:      1. Limited TTE for pericardial effusion.   2. There is a small to moderate pericardial effusion noted adjacent to the lateral left ventricle, a small pericardial effusion noted adjacent to the right atrium and a small pericardial effusion noted adjacent to the anterior right ventricle.There is no echocardiographic evidence of cardiac tamponade.   3. Bilateral pleural effusion noted.   4. Compared to the transthoracic echocardiogram performed on 2025, there have been no significant interval changes.    < end of copied text >     STEFAN WASHINGTON  [id number]    INTERVAL HPI/OVERNIGHT EVENTS: No acute events. Reports 40% improvement since starting steroids. Odynophagia resolved. Bilateral shoulder pain 40% improved. No fevers, new symptoms, or rashes.     MEDICATIONS  (STANDING):  albuterol    90 MICROgram(s) HFA Inhaler 2 Puff(s) Inhalation every 6 hours  colchicine 0.6 milliGRAM(s) Oral two times a day  dextrose 5%. 1000 milliLiter(s) (100 mL/Hr) IV Continuous <Continuous>  dextrose 5%. 1000 milliLiter(s) (50 mL/Hr) IV Continuous <Continuous>  dextrose 50% Injectable 25 Gram(s) IV Push once  dextrose 50% Injectable 12.5 Gram(s) IV Push once  dextrose 50% Injectable 25 Gram(s) IV Push once  dextrose Oral Gel 15 Gram(s) Oral once  flecainide 50 milliGRAM(s) Oral every 12 hours  gabapentin 300 milliGRAM(s) Oral at bedtime  glucagon  Injectable 1 milliGRAM(s) IntraMuscular once  heparin   Injectable 5000 Unit(s) SubCutaneous every 8 hours  influenza   Vaccine 0.5 milliLiter(s) IntraMuscular once  methylPREDNISolone sodium succinate Injectable 40 milliGRAM(s) IV Push daily  metoprolol succinate ER 50 milliGRAM(s) Oral daily  pantoprazole    Tablet 40 milliGRAM(s) Oral before breakfast    MEDICATIONS  (PRN):  acetaminophen     Tablet .. 650 milliGRAM(s) Oral every 6 hours PRN Temp greater or equal to 38C (100.4F), Mild Pain (1 - 3)  albuterol/ipratropium for Nebulization 3 milliLiter(s) Nebulizer every 6 hours PRN Bronchospasm      Allergies    NSAIDs (Hives)    Intolerances        Review of Systems:   as above       Vital Signs Last 24 Hrs  T(C): 36.7 (2025 04:34), Max: 36.7 (2025 04:34)  T(F): 98.1 (2025 04:34), Max: 98.1 (2025 04:34)  HR: 70 (2025 04:34) (67 - 71)  BP: 154/74 (2025 04:34) (137/81 - 154/74)  BP(mean): --  RR: 18 (2025 04:34) (18 - 18)  SpO2: 95% (2025 04:34) (94% - 96%)    Parameters below as of 2025 04:34  Patient On (Oxygen Delivery Method): room air        Physical Exam:  General: alert, NAD, comfortable   MSK: no swelling/warmth/erythema of the joints of the UE/LE. proximal UE 4+/5, LE hip flexors 4+/5, distal strength 5/5   Skin: no visible rashes    LABS:                        7.9    14.26 )-----------( 444      ( 2025 07:25 )             26.2         134[L]  |  98  |  19  ----------------------------<  71  4.5   |  25  |  0.63    Ca    8.5      2025 07:25        Urinalysis Basic - ( 2025 07:25 )    Color: x / Appearance: x / SG: x / pH: x  Gluc: 71 mg/dL / Ketone: x  / Bili: x / Urobili: x   Blood: x / Protein: x / Nitrite: x   Leuk Esterase: x / RBC: x / WBC x   Sq Epi: x / Non Sq Epi: x / Bacteria: x      Anti SS-A Antibody: >8.0 AI *H* [<  1.0 AI: Negative  >=1.0 AI: Positive  Method: Multiplexed flow immunoassay] (25 @ 06:56)  Myeloperoxidase Antibody Assay: <0.2 AI [Effective 2025, the assay methodology for anti-MPO antibodies  testing changed from enzyme-linked immunosorbent assay to multiplex flow  immunoassay. Direct comparison of semi-quantitative values between two  different assay methods is not feasible due to differences in immunoassay  design and antibody epitope recognition.] (25 @ 06:56)  Proteinase 3 Antibody Assay: <0.2 AI [Effective 2025, the assay methodology for anti-PR3 antibodies  testing changed from enzyme-linked immunosorbent assay to multiplex flow  immunoassay. Direct comparison of semi-quantitative values between two  different assay methods is not feasible due to differences in immunoassay  design and antibody epitope recognition.] (25 @ 06:56)  C3 Complement, Serum: 118 mg/dL [81 - 157] (25 @ 06:56)    SSA 52 and 60 (Ro) (EBENEZER) Antibodies, IgG (25 @ 06:41)   SSA-52 (Ro52) (EBENEZER) Antibody, Ig: INTERPRETIVE INFORMATION: SSA-52 (Ro52) (EBENEZER) Antibody, IgG   29 AU/mL or Less ............. Negative   30 - 40 AU/mL ................ Equivocal   41 AU/mL or Greater .......... Positive   SSA-52 (Ro52) and/or SSA-60 (Ro60) antibodies are associated with   a diagnosis of Sjogren syndrome, systemic lupus erythematosus   (SLE), and systemic sclerosis. SSA-52 antibody overlaps   significantly with the major SSc-related antibodies. SSA-52 (Ro52)   antibody occurs frequently in patients with inflammatory   myopathies, often in the presence of interstitial lung disease. AU/mL  SSA-60 (Ro60) (EBENEZER) Antibody, Ig: REFERENCE INTERVAL: SSA-60 (Ro60) (EBENEZER) Antibody, IgG   29 AU/mL or Less ............. Negative   30 - 40 AU/mL ................ Equivocal   41 AU/mL or Greater .......... Positive   Performed By: [facility]   [address] [place] [id number]   : Mulugeta Sellers MD, PhD   CLIA Number: [id number] AU/mL    Immunofixation, Serum (25 @ 06:56)   Immunofixation, Serum:   IgG Lambda Band Identified   Reference Range: None DetectedProtein Electrophoresis, Serum (25 @ 06:56)   Protein Total: 5.9 g/dL  Total Protein, Serum: 5.9 g/dL  Albumin: 2.0 g/dL  Alpha 1: 0.6 g/dL  Alpha 2: 1.1 g/dL  Beta Globulin: 0.6 g/dL  Gamma Globulin: 1.6 g/dL  M-Fermin: 0.6 g/dL  % Albumin: 33.2 %  % Alpha 1: 10.0 %  % Alpha 2: 19.1 %  % Beta: 10.0 %  % Gamma: 27.7 %  % M Fermin: 10.3 %  Albumin/Globulin Ratio: 0.5 Ratio  Serum Protein Electrophoresis Interp: Gamma-Migrating Paraprotein Identified        RADIOLOGY & ADDITIONAL TESTS:    < from: MR Lumbar Spine w/wo IV Cont (25 @ 11:20) >  IMPRESSION: No evidence of osteomyelitis or discitis. No drainable   collections.    < end of copied text >    < from: CT Angio Chest PE Protocol w/ IV Cont (25 @ 06:21) >  IMPRESSION:    No pulmonary embolism within the confines of this exam.    Small bilateral pleural effusions and bibasilar atelectasis.    Cardiomegaly. Moderate sized pericardial effusion compared with prior   exam from 3/7/2025.    < end of copied text >    < from: TTE Limited W or WO Ultrasound Enhancing Agent (25 @ 14:18) >  CONCLUSIONS:      1. Limited TTE for pericardial effusion.   2. There is a small to moderate pericardial effusion noted adjacent to the lateral left ventricle, a small pericardial effusion noted adjacent to the right atrium and a small pericardial effusion noted adjacent to the anterior right ventricle.There is no echocardiographic evidence of cardiac tamponade.   3. Bilateral pleural effusion noted.   4. Compared to the transthoracic echocardiogram performed on 2025, there have been no significant interval changes.    < end of copied text >     STEFAN FELIX  [id number]    INTERVAL HPI/OVERNIGHT EVENTS: No acute events. Reports overall 40% improvement since starting steroids. Odynophagia much improved. Bilateral shoulder pain 40% improved. No fevers, new symptoms, or rashes. Stable dyspnea.     MEDICATIONS  (STANDING):  albuterol    90 MICROgram(s) HFA Inhaler 2 Puff(s) Inhalation every 6 hours  colchicine 0.6 milliGRAM(s) Oral two times a day  dextrose 5%. 1000 milliLiter(s) (100 mL/Hr) IV Continuous <Continuous>  dextrose 5%. 1000 milliLiter(s) (50 mL/Hr) IV Continuous <Continuous>  dextrose 50% Injectable 25 Gram(s) IV Push once  dextrose 50% Injectable 12.5 Gram(s) IV Push once  dextrose 50% Injectable 25 Gram(s) IV Push once  dextrose Oral Gel 15 Gram(s) Oral once  flecainide 50 milliGRAM(s) Oral every 12 hours  gabapentin 300 milliGRAM(s) Oral at bedtime  glucagon  Injectable 1 milliGRAM(s) IntraMuscular once  heparin   Injectable 5000 Unit(s) SubCutaneous every 8 hours  influenza   Vaccine 0.5 milliLiter(s) IntraMuscular once  methylPREDNISolone sodium succinate Injectable 40 milliGRAM(s) IV Push daily  metoprolol succinate ER 50 milliGRAM(s) Oral daily  pantoprazole    Tablet 40 milliGRAM(s) Oral before breakfast    MEDICATIONS  (PRN):  acetaminophen     Tablet .. 650 milliGRAM(s) Oral every 6 hours PRN Temp greater or equal to 38C (100.4F), Mild Pain (1 - 3)  albuterol/ipratropium for Nebulization 3 milliLiter(s) Nebulizer every 6 hours PRN Bronchospasm      Allergies    NSAIDs (Hives)    Intolerances        Review of Systems:   as above       Vital Signs Last 24 Hrs  T(C): 36.7 (2025 04:34), Max: 36.7 (2025 04:34)  T(F): 98.1 (2025 04:34), Max: 98.1 (2025 04:34)  HR: 70 (2025 04:34) (67 - 71)  BP: 154/74 (2025 04:34) (137/81 - 154/74)  BP(mean): --  RR: 18 (2025 04:34) (18 - 18)  SpO2: 95% (2025 04:34) (94% - 96%)    Parameters below as of 2025 04:34  Patient On (Oxygen Delivery Method): room air        Physical Exam:  General: alert, NAD, comfortable   MSK: L medial malleolus mild swelling, otherwise no swelling/warmth/erythema of the joints of the UE/LE. proximal UE 4+/5, LE hip flexors 4+/5, distal strength 5/5   Skin: no visible rashes    LABS:                        7.9    14.26 )-----------( 444      ( 2025 07:25 )             26.2     04-    134[L]  |  98  |  19  ----------------------------<  71  4.5   |  25  |  0.63    Ca    8.5      2025 07:25        Urinalysis Basic - ( 2025 07:25 )    Color: x / Appearance: x / SG: x / pH: x  Gluc: 71 mg/dL / Ketone: x  / Bili: x / Urobili: x   Blood: x / Protein: x / Nitrite: x   Leuk Esterase: x / RBC: x / WBC x   Sq Epi: x / Non Sq Epi: x / Bacteria: x      Anti SS-A Antibody: >8.0 AI *H* [<  1.0 AI: Negative  >=1.0 AI: Positive  Method: Multiplexed flow immunoassay] (25 @ 06:56)  Myeloperoxidase Antibody Assay: <0.2 AI [Effective 2025, the assay methodology for anti-MPO antibodies  testing changed from enzyme-linked immunosorbent assay to multiplex flow  immunoassay. Direct comparison of semi-quantitative values between two  different assay methods is not feasible due to differences in immunoassay  design and antibody epitope recognition.] (25 @ 06:56)  Proteinase 3 Antibody Assay: <0.2 AI [Effective 2025, the assay methodology for anti-PR3 antibodies  testing changed from enzyme-linked immunosorbent assay to multiplex flow  immunoassay. Direct comparison of semi-quantitative values between two  different assay methods is not feasible due to differences in immunoassay  design and antibody epitope recognition.] (25 @ 06:56)  C3 Complement, Serum: 118 mg/dL [81 - 157] (25 @ 06:56)    SSA 52 and 60 (Ro) (EBENEZER) Antibodies, IgG (25 @ 06:41)   SSA-52 (Ro52) (EBENEZER) Antibody, Ig: INTERPRETIVE INFORMATION: SSA-52 (Ro52) (EBENEZER) Antibody, IgG   29 AU/mL or Less ............. Negative   30 - 40 AU/mL ................ Equivocal   41 AU/mL or Greater .......... Positive   SSA-52 (Ro52) and/or SSA-60 (Ro60) antibodies are associated with   a diagnosis of Sjogren syndrome, systemic lupus erythematosus   (SLE), and systemic sclerosis. SSA-52 antibody overlaps   significantly with the major SSc-related antibodies. SSA-52 (Ro52)   antibody occurs frequently in patients with inflammatory   myopathies, often in the presence of interstitial lung disease. AU/mL  SSA-60 (Ro60) (EBENEZER) Antibody, Ig: REFERENCE INTERVAL: SSA-60 (Ro60) (EBENEZER) Antibody, IgG   29 AU/mL or Less ............. Negative   30 - 40 AU/mL ................ Equivocal   41 AU/mL or Greater .......... Positive   Performed By: [facility]   [address] [place] [id number]   : Mulugeta Sellers MD, PhD   CLIA Number: [id number] AU/mL    Immunofixation, Serum (25 @ 06:56)   Immunofixation, Serum:   IgG Lambda Band Identified   Reference Range: None DetectedProtein Electrophoresis, Serum (25 @ 06:56)   Protein Total: 5.9 g/dL  Total Protein, Serum: 5.9 g/dL  Albumin: 2.0 g/dL  Alpha 1: 0.6 g/dL  Alpha 2: 1.1 g/dL  Beta Globulin: 0.6 g/dL  Gamma Globulin: 1.6 g/dL  M-Fermin: 0.6 g/dL  % Albumin: 33.2 %  % Alpha 1: 10.0 %  % Alpha 2: 19.1 %  % Beta: 10.0 %  % Gamma: 27.7 %  % M Fermin: 10.3 %  Albumin/Globulin Ratio: 0.5 Ratio  Serum Protein Electrophoresis Interp: Gamma-Migrating Paraprotein Identified        RADIOLOGY & ADDITIONAL TESTS:    < from: MR Lumbar Spine w/wo IV Cont (25 @ 11:20) >  IMPRESSION: No evidence of osteomyelitis or discitis. No drainable   collections.    < end of copied text >    < from: CT Angio Chest PE Protocol w/ IV Cont (25 @ 06:21) >  IMPRESSION:    No pulmonary embolism within the confines of this exam.    Small bilateral pleural effusions and bibasilar atelectasis.    Cardiomegaly. Moderate sized pericardial effusion compared with prior   exam from 3/7/2025.    < end of copied text >    < from: TTE Limited W or WO Ultrasound Enhancing Agent (25 @ 14:18) >  CONCLUSIONS:      1. Limited TTE for pericardial effusion.   2. There is a small to moderate pericardial effusion noted adjacent to the lateral left ventricle, a small pericardial effusion noted adjacent to the right atrium and a small pericardial effusion noted adjacent to the anterior right ventricle.There is no echocardiographic evidence of cardiac tamponade.   3. Bilateral pleural effusion noted.   4. Compared to the transthoracic echocardiogram performed on 2025, there have been no significant interval changes.    < end of copied text >     STEFAN WASHINGTON  [id number]    INTERVAL HPI/OVERNIGHT EVENTS: No acute events. Reports overall 40% improvement since starting steroids. Odynophagia much improved. Bilateral shoulder pain 40% improved. Also reports small joint involvement of hands/wrists, fingers, and ankles with improvement. No fevers, new symptoms, or rashes. Stable dyspnea.     MEDICATIONS  (STANDING):  albuterol    90 MICROgram(s) HFA Inhaler 2 Puff(s) Inhalation every 6 hours  colchicine 0.6 milliGRAM(s) Oral two times a day  dextrose 5%. 1000 milliLiter(s) (100 mL/Hr) IV Continuous <Continuous>  dextrose 5%. 1000 milliLiter(s) (50 mL/Hr) IV Continuous <Continuous>  dextrose 50% Injectable 25 Gram(s) IV Push once  dextrose 50% Injectable 12.5 Gram(s) IV Push once  dextrose 50% Injectable 25 Gram(s) IV Push once  dextrose Oral Gel 15 Gram(s) Oral once  flecainide 50 milliGRAM(s) Oral every 12 hours  gabapentin 300 milliGRAM(s) Oral at bedtime  glucagon  Injectable 1 milliGRAM(s) IntraMuscular once  heparin   Injectable 5000 Unit(s) SubCutaneous every 8 hours  influenza   Vaccine 0.5 milliLiter(s) IntraMuscular once  methylPREDNISolone sodium succinate Injectable 40 milliGRAM(s) IV Push daily  metoprolol succinate ER 50 milliGRAM(s) Oral daily  pantoprazole    Tablet 40 milliGRAM(s) Oral before breakfast    MEDICATIONS  (PRN):  acetaminophen     Tablet .. 650 milliGRAM(s) Oral every 6 hours PRN Temp greater or equal to 38C (100.4F), Mild Pain (1 - 3)  albuterol/ipratropium for Nebulization 3 milliLiter(s) Nebulizer every 6 hours PRN Bronchospasm      Allergies    NSAIDs (Hives)    Intolerances        Review of Systems:   as above       Vital Signs Last 24 Hrs  T(C): 36.7 (2025 04:34), Max: 36.7 (2025 04:34)  T(F): 98.1 (2025 04:34), Max: 98.1 (2025 04:34)  HR: 70 (2025 04:34) (67 - 71)  BP: 154/74 (2025 04:34) (137/81 - 154/74)  BP(mean): --  RR: 18 (2025 04:34) (18 - 18)  SpO2: 95% (2025 04:34) (94% - 96%)    Parameters below as of 2025 04:34  Patient On (Oxygen Delivery Method): room air        Physical Exam:  General: alert, NAD, comfortable   MSK: L medial malleolus mild swelling, otherwise no swelling/warmth/erythema of the joints of the UE/LE. proximal UE 4+/5, LE hip flexors 4+/5, distal strength 5/5   Skin: no visible rashes    LABS:                        7.9    14.26 )-----------( 444      ( 2025 07:25 )             26.2         134[L]  |  98  |  19  ----------------------------<  71  4.5   |  25  |  0.63    Ca    8.5      2025 07:25        Urinalysis Basic - ( 2025 07:25 )    Color: x / Appearance: x / SG: x / pH: x  Gluc: 71 mg/dL / Ketone: x  / Bili: x / Urobili: x   Blood: x / Protein: x / Nitrite: x   Leuk Esterase: x / RBC: x / WBC x   Sq Epi: x / Non Sq Epi: x / Bacteria: x      Anti SS-A Antibody: >8.0 AI *H* [<  1.0 AI: Negative  >=1.0 AI: Positive  Method: Multiplexed flow immunoassay] (25 @ 06:56)  Myeloperoxidase Antibody Assay: <0.2 AI [Effective 2025, the assay methodology for anti-MPO antibodies  testing changed from enzyme-linked immunosorbent assay to multiplex flow  immunoassay. Direct comparison of semi-quantitative values between two  different assay methods is not feasible due to differences in immunoassay  design and antibody epitope recognition.] (25 @ 06:56)  Proteinase 3 Antibody Assay: <0.2 AI [Effective 2025, the assay methodology for anti-PR3 antibodies  testing changed from enzyme-linked immunosorbent assay to multiplex flow  immunoassay. Direct comparison of semi-quantitative values between two  different assay methods is not feasible due to differences in immunoassay  design and antibody epitope recognition.] (25 @ 06:56)  C3 Complement, Serum: 118 mg/dL [81 - 157] (25 @ 06:56)    SSA 52 and 60 (Ro) (EBENEZER) Antibodies, IgG (25 @ 06:41)   SSA-52 (Ro52) (EBENEZER) Antibody, Ig: INTERPRETIVE INFORMATION: SSA-52 (Ro52) (EBENEZER) Antibody, IgG   29 AU/mL or Less ............. Negative   30 - 40 AU/mL ................ Equivocal   41 AU/mL or Greater .......... Positive   SSA-52 (Ro52) and/or SSA-60 (Ro60) antibodies are associated with   a diagnosis of Sjogren syndrome, systemic lupus erythematosus   (SLE), and systemic sclerosis. SSA-52 antibody overlaps   significantly with the major SSc-related antibodies. SSA-52 (Ro52)   antibody occurs frequently in patients with inflammatory   myopathies, often in the presence of interstitial lung disease. AU/mL  SSA-60 (Ro60) (EBENEZER) Antibody, Ig: REFERENCE INTERVAL: SSA-60 (Ro60) (EBENEZER) Antibody, IgG   29 AU/mL or Less ............. Negative   30 - 40 AU/mL ................ Equivocal   41 AU/mL or Greater .......... Positive   Performed By: [facility]   [address] [place] [id number]   : Mulugeta Sellers MD, PhD   CLIA Number: [id number] AU/mL    Immunofixation, Serum (25 @ 06:56)   Immunofixation, Serum:   IgG Lambda Band Identified   Reference Range: None DetectedProtein Electrophoresis, Serum (25 @ 06:56)   Protein Total: 5.9 g/dL  Total Protein, Serum: 5.9 g/dL  Albumin: 2.0 g/dL  Alpha 1: 0.6 g/dL  Alpha 2: 1.1 g/dL  Beta Globulin: 0.6 g/dL  Gamma Globulin: 1.6 g/dL  M-Fermin: 0.6 g/dL  % Albumin: 33.2 %  % Alpha 1: 10.0 %  % Alpha 2: 19.1 %  % Beta: 10.0 %  % Gamma: 27.7 %  % M Fermin: 10.3 %  Albumin/Globulin Ratio: 0.5 Ratio  Serum Protein Electrophoresis Interp: Gamma-Migrating Paraprotein Identified        RADIOLOGY & ADDITIONAL TESTS:    < from: MR Lumbar Spine w/wo IV Cont (25 @ 11:20) >  IMPRESSION: No evidence of osteomyelitis or discitis. No drainable   collections.    < end of copied text >    < from: CT Angio Chest PE Protocol w/ IV Cont (25 @ 06:21) >  IMPRESSION:    No pulmonary embolism within the confines of this exam.    Small bilateral pleural effusions and bibasilar atelectasis.    Cardiomegaly. Moderate sized pericardial effusion compared with prior   exam from 3/7/2025.    < end of copied text >    < from: TTE Limited W or WO Ultrasound Enhancing Agent (25 @ 14:18) >  CONCLUSIONS:      1. Limited TTE for pericardial effusion.   2. There is a small to moderate pericardial effusion noted adjacent to the lateral left ventricle, a small pericardial effusion noted adjacent to the right atrium and a small pericardial effusion noted adjacent to the anterior right ventricle.There is no echocardiographic evidence of cardiac tamponade.   3. Bilateral pleural effusion noted.   4. Compared to the transthoracic echocardiogram performed on 2025, there have been no significant interval changes.    < end of copied text >

## 2025-04-21 NOTE — DISCHARGE NOTE PROVIDER - CARE PROVIDERS DIRECT ADDRESSES
[email or web address]

## 2025-04-21 NOTE — DISCHARGE NOTE PROVIDER - NSDCCPCAREPLAN_GEN_ALL_CORE_FT
PRINCIPAL DISCHARGE DIAGNOSIS  Diagnosis: Mixed connective tissue disease  Assessment and Plan of Treatment:   You are diagnosed with connective tissue disease which is a rheumatological condition. This is what likely caused you to have shoulder pain, weakness, and joint pains. You were on IV steroids initially, and now you are on oral steroids - please continue taking the prednisone 40mg every day until you see your rheumatologist. You should also take protonix 40mg every day in order to prevent reflux and ulcers.        SECONDARY DISCHARGE DIAGNOSES  Diagnosis: Pericarditis  Assessment and Plan of Treatment: We also saw fluid around your heart in your sonogram, which is called a pericardial effusion. Given chest pain, we diagnosed you with pericarditis. Please take colchicine daily until you see your caridologist. Repeat sonogram showed that the effusion was stable.       Diagnosis: Paroxysmal SVT (supraventricular tachycardia)  Assessment and Plan of Treatment:   For your SVT, we started a new medication called flecainide and metoprolol. Please take these medications as written, and see your cardiologist within 1-2 weeks.     PRINCIPAL DISCHARGE DIAGNOSIS  Diagnosis: Mixed connective tissue disease  Assessment and Plan of Treatment:   You are diagnosed with connective tissue disease which is a rheumatological condition. This is what likely caused you to have shoulder pain, weakness, and joint pains. You were on IV steroids initially, and now you are on oral steroids - please continue taking the prednisone 40mg every day until you see your rheumatologist. You should also take protonix 40mg every day in order to prevent reflux and ulcers.        SECONDARY DISCHARGE DIAGNOSES  Diagnosis: Pericarditis  Assessment and Plan of Treatment: We also saw fluid around your heart in your sonogram, which is called a pericardial effusion. Given chest pain, we diagnosed you with pericarditis. Please take colchicine daily until you see your caridologist. Repeat sonogram showed that the effusion was stable.       Diagnosis: Paroxysmal SVT (supraventricular tachycardia)  Assessment and Plan of Treatment:   For your SVT, we started a new medication called flecainide and metoprolol. Please take these medications as written, and see your cardiologist within 1-2 weeks.    Diagnosis: Elevated liver enzymes  Assessment and Plan of Treatment: We also noticed that your liver enzymes were elevated, about 2-3 upper limit of normal. This may be a normal finding but also may be related to medications. You had no abdominal pain and otherwise has no nausea or vomiting. You should follow up with your primary care doctor and get repeat bloodwork to assess your liver enzymes.

## 2025-04-21 NOTE — PROGRESS NOTE ADULT - NSPROGADDITIONALINFOA_GEN_ALL_CORE
Discussed plan of care with ACP.  Dispo: PT rec [facility]IO ALBERTO. Patient undecided, may want HPT.
d/w acp
I had a prolonged conversation with the patient/family regarding hospital course, differential diagnosis and results of diagnostic tests.  Plan of care discussed with patient/family after the evaluation. Patient/family express clear understanding and satisfaction with the plan of care.  Sixty five minutes spent on encounter, of which more than fifty percent of the encounter was spent on counseling and/or coordinating care by the attending physician.
Discussed plan of care with ACP.  Dispo: PT rec [facility]IO ALB[facility]
Discussed plan of care with ACP.  Dispo: PT rec MARIO ALBERTO. Patient undecided, may want HPT.
Discussed plan of care with ACP.  Dispo: PT rec MARIO ALBERTO. Patient undecided, may want HPT.

## 2025-04-21 NOTE — DISCHARGE NOTE PROVIDER - NSFOLLOWUPCLINICS_GEN_ALL_ED_FT
[facility]  Hematology/Oncology  [address] [id number]  Phone: [phone]  Fax:   Follow Up Time: Routine

## 2025-04-21 NOTE — PROGRESS NOTE ADULT - PROBLEM SELECTOR PLAN 7
- W/u consistent with AOCD with ferritin level 1179  - Stop IV iron and transfuse hgb <7.0  - Hematology following    #Thrombocytosis  - likely reactive
DVT PPX: Hep sq
- W/u consistent with AOCD with ferritin level 1179  - Stop IV iron and transfuse hgb <7.0  - Hematology following    #Thrombocytosis  - likely reactive
- W/u consistent with AOCD with ferritin level 1179  - Stop IV iron and transfuse hgb <7.0  - Hematology following    #Thrombocytosis  - likely reactive
- W/u consistent with AOCD with ferritin level 1179  - Stop IV iron and transfuse hgb <7.0  - Hematology following

## 2025-04-21 NOTE — DISCHARGE NOTE PROVIDER - NSDCCPTREATMENT_GEN_ALL_CORE_FT
PRINCIPAL PROCEDURE  Procedure: MRI lumbar spine w/o contrast  Findings and Treatment:   IMPRESSION: No evidence of osteomyelitis or discitis. No drainable   collections.        SECONDARY PROCEDURE  Procedure: CT abdomen  Findings and Treatment: IMPRESSION:  No acute intra-abdominal findings.      Procedure: CT angiogram chest w contrast  Findings and Treatment:   IMPRESSION:  No pulmonary embolism within the confines of this exam.  Small bilateral pleural effusions and bibasilar atelectasis.  Cardiomegaly. Moderate sized pericardial effusion compared with prior   exam from 3/7/2025.      Procedure: Complete transthoracic echocardiography (TTE)  Findings and Treatment: CONCLUSIONS:      1. Limited TTE for pericardial effusion.   2. There is a small to moderate pericardial effusion noted adjacent to the lateral left ventricle, a small pericardial effusion noted adjacent to the right atrium and a small pericardial effusion noted adjacent to the anterior right ventricle.There is no echocardiographic evidence of cardiac tamponade.   3. Bilateral pleural effusion noted.

## 2025-04-21 NOTE — PROGRESS NOTE ADULT - SUBJECTIVE AND OBJECTIVE BOX
DATE OF SERVICE: 04-21-25 @ 10:39    Patient is a 64y old  Female who presents with a chief complaint of Palpitations (21 Apr 2025 09:27)      INTERVAL HISTORY: feels okay    REVIEW OF SYSTEMS:  CONSTITUTIONAL: No weakness  EYES/ENT: No visual changes;  No throat pain   NECK: No pain or stiffness  RESPIRATORY: No cough, wheezing; No shortness of breath  CARDIOVASCULAR: No chest pain or palpitations  GASTROINTESTINAL: No abdominal  pain. No nausea, vomiting, or hematemesis  GENITOURINARY: No dysuria, frequency or hematuria  NEUROLOGICAL: No stroke like symptoms  SKIN: No rashes    TELEMETRY Personally reviewed: SR 70s  	  MEDICATIONS:  flecainide 50 milliGRAM(s) Oral every 12 hours  metoprolol succinate ER 50 milliGRAM(s) Oral daily        PHYSICAL EXAM:  T(C): 36.7 (04-21-25 @ 04:34), Max: 36.7 (04-21-25 @ 04:34)  HR: 70 (04-21-25 @ 04:34) (67 - 71)  BP: 154/74 (04-21-25 @ 04:34) (137/81 - 154/74)  RR: 18 (04-21-25 @ 04:34) (18 - 18)  SpO2: 95% (04-21-25 @ 04:34) (94% - 96%)  Wt(kg): --  I&O's Summary    20 Apr 2025 07:01  -  21 Apr 2025 07:00  --------------------------------------------------------  IN: 0 mL / OUT: 1100 mL / NET: -1100 mL          Appearance: In no distress	  HEENT:    PERRL, EOMI	  Cardiovascular:  S1 S2, No JVD  Respiratory: Lungs clear to auscultation	  Gastrointestinal:  Soft, Non-tender, + BS	  Vascularature:  No edema of LE  Psychiatric: Appropriate affect   Neuro: no acute focal deficits                               7.9    14.26 )-----------( 444      ( 21 Apr 2025 07:25 )             26.2     04-21    134[L]  |  98  |  19  ----------------------------<  71  4.5   |  25  |  0.63    Ca    8.5      21 Apr 2025 07:25          Labs personally reviewed      ASSESSMENT/PLAN: 	    64F with hx HTN, MAURICIO previously on IV iron, recent admission for odynophagia/dysphagia, elevated inflammatory markers presents with palpitations and chest pain.    1. Tachyarrhythmia - pSVT  - EP consulted for rythym control strategy eval   - Per EP - ECGs consistent with SVT with a pathway  - ?precipitated by viral pericarditis   - 4/16 EP started pt on Metoprolol 50mg QD and Flecainide 50mg q12 hrs; pt has been in SR since then  - Continue tele monitoring     2. Chest pain  - Recent NST (4/14/25) normal myocardial perfusion  - TTE 4/17/25 - preserved EF, small to mod pericardial effusion, with no evidence of tamponade, thickened pericardium, b/l pleural effusion (compared to small effusion 3/11/25)  - ProBNP elevated 2944 (compared to 318 last month)   - 4/17 started Colchicine 0.6mg BID for likely pericarditis with TTE findings and pleuritic chest pain  - Repeat echo to assess for progression of pericardial effusion 4/19 noted with stable effusion    3. Systemic inflammatory response syndrome (SIRS).   ·  Plan: Pt with leukocytosis and febrile episode 101 in ED  - Viral panel negative, UA negative for UTI, blood cx NGTD  - CT chest negative for pna   - LE dopplers negative for DVT   - ID following, less likely infectious suspect autoimmune. Monitor off abx.   - Rheumatology consulted - undergoing autoimmune w/u      OP FU with Dr Lyman and Dr Graver Iolani Behrbom, AG-NP   Christopher Gaffney DO [facility]  Cardiovascular Medicine  [address], [address]  Available through call or text on [employer]  Office: [phone]

## 2025-04-21 NOTE — DISCHARGE NOTE PROVIDER - INSTRUCTIONS
-Recommend Heme/Onc f/u given IgG lambda band noted on immunofixation   -Pt will need outpatient f/u with Dr. House after discharge

## 2025-04-21 NOTE — DISCHARGE NOTE PROVIDER - NSDCMRMEDTOKEN_GEN_ALL_CORE_FT
gabapentin 100 mg oral capsule: 2 cap(s) orally once a day (at bedtime) for 3 days (NOTE: As per Patient, previously was on 100mg orally capsule once a day for 3 days)  Iron oral tablet: 1 tablet orally once a day  Vitamin D oral tablet: 1 tablet orally once a day   colchicine 0.6 mg oral tablet: 1 tab(s) orally 2 times a day  flecainide 50 mg oral tablet: 1 tab(s) orally every 12 hours  gabapentin 300 mg oral capsule: 1 cap(s) orally once a day (at bedtime)  metoprolol succinate 50 mg oral tablet, extended release: 1 tab(s) orally once a day  pantoprazole 40 mg oral delayed release tablet: 1 tab(s) orally once a day (before a meal)  predniSONE 20 mg oral tablet: 2 tab(s) orally once a day   flecainide 50 mg oral tablet: 1 tab(s) orally every 12 hours  gabapentin 300 mg oral capsule: 1 cap(s) orally once a day (at bedtime)  metoprolol succinate 50 mg oral tablet, extended release: 1 tab(s) orally once a day  pantoprazole 40 mg oral delayed release tablet: 1 tab(s) orally once a day (before a meal)  predniSONE 20 mg oral tablet: 2 tab(s) orally once a day

## 2025-04-21 NOTE — PROGRESS NOTE ADULT - SUBJECTIVE AND OBJECTIVE BOX
infectious diseases progress note:    Patient is a 64y old  Female who presents with a chief complaint of Palpitations (20 Apr 2025 15:31)        Palpitations               Allergies    NSAIDs (Hives)    Intolerances        ANTIBIOTICS/RELEVANT:  antimicrobials    immunologic:  influenza   Vaccine 0.5 milliLiter(s) IntraMuscular once    OTHER:  acetaminophen     Tablet .. 650 milliGRAM(s) Oral every 6 hours PRN  albuterol    90 MICROgram(s) HFA Inhaler 2 Puff(s) Inhalation every 6 hours  albuterol/ipratropium for Nebulization 3 milliLiter(s) Nebulizer every 6 hours PRN  colchicine 0.6 milliGRAM(s) Oral two times a day  dextrose 5%. 1000 milliLiter(s) IV Continuous <Continuous>  dextrose 5%. 1000 milliLiter(s) IV Continuous <Continuous>  dextrose 50% Injectable 25 Gram(s) IV Push once  dextrose 50% Injectable 12.5 Gram(s) IV Push once  dextrose 50% Injectable 25 Gram(s) IV Push once  dextrose Oral Gel 15 Gram(s) Oral once  flecainide 50 milliGRAM(s) Oral every 12 hours  gabapentin 300 milliGRAM(s) Oral at bedtime  glucagon  Injectable 1 milliGRAM(s) IntraMuscular once  heparin   Injectable 5000 Unit(s) SubCutaneous every 8 hours  methylPREDNISolone sodium succinate Injectable 40 milliGRAM(s) IV Push daily  metoprolol succinate ER 50 milliGRAM(s) Oral daily  pantoprazole    Tablet 40 milliGRAM(s) Oral before breakfast      Objective:  Vital Signs Last 24 Hrs  T(C): 36.7 (21 Apr 2025 04:34), Max: 36.7 (21 Apr 2025 04:34)  T(F): 98.1 (21 Apr 2025 04:34), Max: 98.1 (21 Apr 2025 04:34)  HR: 70 (21 Apr 2025 04:34) (67 - 71)  BP: 154/74 (21 Apr 2025 04:34) (137/81 - 154/74)  BP(mean): --  RR: 18 (21 Apr 2025 04:34) (18 - 18)  SpO2: 95% (21 Apr 2025 04:34) (94% - 96%)    Parameters below as of 21 Apr 2025 04:34  Patient On (Oxygen Delivery Method): room air         Ear/Nose/Throat: no oral lesion, no sinus tenderness on percussion	  Neck:no JVD, no lymphadenopathy, supple  Respiratory: CTA rula  Cardiovascular: S1S2 RRR, no murmurs  Gastrointestinal:soft, (+) BS, no HSM  Extremities:no e/e/c        LABS:                        7.9    14.26 )-----------( 444      ( 21 Apr 2025 07:25 )             26.2     04-21    134[L]  |  98  |  19  ----------------------------<  71  4.5   |  25  |  0.63    Ca    8.5      21 Apr 2025 07:25        Urinalysis Basic - ( 21 Apr 2025 07:25 )    Color: x / Appearance: x / SG: x / pH: x  Gluc: 71 mg/dL / Ketone: x  / Bili: x / Urobili: x   Blood: x / Protein: x / Nitrite: x   Leuk Esterase: x / RBC: x / WBC x   Sq Epi: x / Non Sq Epi: x / Bacteria: x          MICROBIOLOGY:    RECENT CULTURES:  04-16 @ 04:50 Blood Blood-Peripheral                No growth at 4 days    04-16 @ 04:35 Blood Blood-Peripheral                No growth at 4 days          RESPIRATORY CULTURES:              RADIOLOGY & ADDITIONAL STUDIES:        Pager [phone]  After 5 pm/weekends or if no response :[phone]

## 2025-04-21 NOTE — PROGRESS NOTE ADULT - ASSESSMENT
64F PMH HTN, MAURICIO with symptoms of palpitations, odynophagia, dysphagia, myalgias, weight loss. Rheumatology consulted to evaluate for autoimmune disease    # Proximal myalgia UE>> LE  # Pericardial and pleural effusion  # Odynophagia   # Anemia  # SSA and Ro52 positive  # Elevated inflammatory markers     -Pt with several months of odynophagia, then developed severe myalgias and arthralgias   -Here with UE abductor weakness, appears to be mostly due to pain  -SSA >8, Ro52 positive to 156, Ro60 negative. Previously NORMA was positive 1:1280, repeat here negative, dsDNA, EBENEZER, C3, C4, RF, CCP, SCL-70, centromere, RNA terra 3, ANCA, Christin-1, APS serologies, MPO, PR3 negative  -Prior CK and myoglobin were low, with normal aldolase  -Has elevated , , and ferritin 1179  -Pt also has anemia, pt has hx of anemia from menorrhagia but s/p hysterectomy for fibroids in 2009. Pt had mammo and pap last yr. Colonoscopy 2011 reportedly normal. Unclear cause of anemia, possibly mixed AOCD and iron def  -Pt previously had barium esophogram, which showed few contractions, no strictures. EGD showed gastritis, esophagus normal. Pending outpt esophageal manometry  -CT chest with small pleural effusions and moderate pericardial effusion, TTE with multiple pericardial effusions noted     At this time, pt has Ro 52 positive and constellation of symptoms of odynophagia, severe myalgia of UE, with serositis. At this time, she does not fit into a specific autoimmune diagnosis, likely UCTD / PMR-like with Sjogren's. MRI showed no evidence of discitis and OM. Infectious work-up is negative to date. Started on steroid trial Solumedrol 40 mg IV daily (4/18 - ) with significant clinical improvement     Recommendations:  -Steroid plan as follows:   -PPI while on steroids, will determine PCP ppx depending on course of steroids  -Pt will need outpatient f/u with Dr. House after discharge     INCOMPLETE PLEASE WAIT    64F PMH HTN, MAURICIO with symptoms of palpitations, odynophagia, dysphagia, myalgias, weight loss. Rheumatology consulted to evaluate for autoimmune disease    # Proximal myalgia UE >> LE  # Pericardial and pleural effusion  # Odynophagia now resolved s/p steroids   # Anemia  # SSA and Ro52 positive  # Elevated inflammatory markers     -Pt with several months of odynophagia, then developed severe myalgias and arthralgias   -Here with UE abductor weakness, appears to be mostly due to pain  -SSA >8, Ro52 positive to 156, Ro60 negative. Previously NORMA was positive 1:1280, repeat here negative, dsDNA, EBENEZER, C3, C4, RF, CCP, SCL-70, centromere, RNA terra 3, ANCA, Christin-1, APS serologies, MPO, PR3 negative  -Prior CK and myoglobin were low, with normal aldolase  -Has elevated , , and ferritin 1179  -Pt also has anemia, pt has hx of anemia from menorrhagia but s/p hysterectomy for fibroids in 2009. Pt had mammo and pap last yr. Colonoscopy 2011 reportedly normal. Likely AOCD   -Pt previously had barium esophogram, which showed few contractions, no strictures. EGD showed gastritis, esophagus normal. Pending outpt esophageal manometry  -CT chest with small pleural effusions and moderate pericardial effusion, TTE with multiple pericardial effusions noted     At this time, pt has Ro 52 positive and constellation of symptoms of odynophagia, severe myalgia of UE, with serositis. At this time, she does not fit into a specific autoimmune diagnosis, likely UCTD / PMR-like with Sjogren's. MRI showed no evidence of discitis and OM. Infectious work-up is negative to date. Started on steroid trial Solumedrol 40 mg IV daily (4/18 - ) with significant clinical improvement     Recommendations:  -Steroid plan as follows: please transition to prednisone 40 mg daily on discharge   -PPI while on steroids, will determine PCP ppx depending on course of steroids  -Recommend Heme/Onc f/u given IgG lambda band noted on immunofixation   -Pt will need outpatient f/u with Dr. House after discharge (we will help coordinate appt)     INCOMPLETE PLEASE WAIT    64F PMH HTN, MAURICIO with symptoms of palpitations, odynophagia, dysphagia, myalgias, weight loss. Rheumatology consulted to evaluate for autoimmune disease    # Inflammatory arthritis (hands/wrists, shoulders, feet)   # Myalgias   # Pericardial and pleural effusion  # Odynophagia ?cricoarytenoid involvement   # Normocytic anemia  # SSA and Ro52 positive  # Elevated inflammatory markers   # Tachyarrhythmia - paroxysmal SVT     -Pt with several months of odynophagia, then developed migratory and additive inflammatory arthritis (reports hands/wrists, shoulders, feet) and myalgias  -Strong family hx of rheumatologic disease -- RA in her aunt and cousin   -SSA >8, Ro52 positive to 156, Ro60 negative. Previously NORMA was positive 1:1280, repeat here negative, dsDNA, EBENEZER, C3, C4, RF, CCP, SCL-70, centromere, RNA terra 3, ANCA, Christin-1, APS serologies, MPO, PR3 negative  -Prior CK and myoglobin were low, with normal aldolase  -Has elevated , , and elevated ferritin 1179  -Pt also has anemia, pt has hx of anemia from menorrhagia but s/p hysterectomy for fibroids in 2009. Pt had mammo and pap last yr. Colonoscopy 2011 reportedly normal. Likely anemia of chronic disease   -Pt previously had barium esophogram, which showed few contractions, no strictures. EGD showed gastritis, esophagus normal. Pending outpt esophageal manometry  -CT chest with small pleural effusions and moderate pericardial effusion, TTE with multiple pericardial effusions noted     At this time, pt has elevated inflammatory markers, Ro 52 positive and constellation of symptoms of odynophagia, inflammatory arthritis, myalgias, and serositis. At this time, she does not fit into a specific autoimmune diagnosis. Some considerations include seronegative RA (given small joint involvement with odynophagia ?cricoarytenoid involvement, though serositis is less common in RA), SLE (in setting of serositis, though sub serologies are negative), Sjogren's (lacks classic sicca symptoms), and Still's disease (though is a diagnosis of exclusion and lacks other features such as rash, lymphadenopathy, transaminitis, etc.). She was started on steroid trial Solumedrol 40 mg IV daily (4/18 - ) with significant clinical improvement     Recommendations:  -F/u G6PD and quantiferon   -Please transition to prednisone 40 mg daily on discharge   -C/w GI prophylaxis   -Recommend Heme/Onc f/u given IgG lambda band noted on immunofixation   -Please check ASO and TPMT studies   -Would consider initiation of MTX as steroid sparing agent outpatient   -Pt will need outpatient f/u with Dr. House after discharge (we will help coordinate appt)   -No contraindication to d/c from rheum perspective     Discussed with primary team  Discussed with Dr. Thanh Adamson MD  Rheumatology Fellow  Available on TEAMS  64F PMH HTN, MAURICIO with symptoms of palpitations, odynophagia, dysphagia, myalgias, weight loss. Rheumatology consulted to evaluate for autoimmune disease    # Inflammatory arthritis (hands/wrists, shoulders, feet)   # Myalgias   # Pericardial and pleural effusion  # Odynophagia ?cricoarytenoid involvement   # Normocytic anemia  # SSA and Ro52 positive  # Elevated inflammatory markers   # Tachyarrhythmia - paroxysmal SVT     -Pt with several months of odynophagia, then developed migratory and additive inflammatory arthritis (reports hands/wrists, shoulders, feet) and myalgias  -Strong family hx of rheumatologic disease -- RA in her aunt and cousin   -SSA >8, Ro52 positive to 156, Ro60 negative. Previously NORMA was positive 1:1280, repeat here negative, dsDNA, EBENEZER, C3, C4, RF, CCP, SCL-70, centromere, RNA terra 3, ANCA, Christin-1, APS serologies, MPO, PR3 negative  -Prior CK and myoglobin were low, with normal aldolase  -Has elevated , , and elevated ferritin 1179  -Pt also has anemia, pt has hx of anemia from menorrhagia but s/p hysterectomy for fibroids in 2009. Pt had mammo and pap last yr. Colonoscopy 2011 reportedly normal. Likely anemia of chronic disease   -Pt previously had barium esophogram, which showed few contractions, no strictures. EGD showed gastritis, esophagus normal. Pending outpt esophageal manometry  -CT chest with small pleural effusions and moderate pericardial effusion, TTE with multiple pericardial effusions noted     At this time, pt has elevated inflammatory markers, Ro 52 positive and constellation of symptoms of odynophagia, inflammatory arthritis, myalgias, and serositis. At this time, she does not fit into a specific autoimmune diagnosis. Some considerations include seronegative RA (given small joint involvement with odynophagia ?cricoarytenoid involvement, though serositis is less common in RA), SLE (in setting of serositis, though sub serologies are negative), Sjogren's (lacks classic sicca symptoms), and Still's disease (though is a diagnosis of exclusion and lacks other features such as rash, lymphadenopathy, transaminitis, etc.). She was started on steroid trial Solumedrol 40 mg IV daily (4/18 - ) with significant clinical improvement     Recommendations:  -F/u G6PD and quantiferon   -Please transition to prednisone 40 mg daily on discharge   -C/w GI prophylaxis   -Recommend Heme/Onc f/u given IgG lambda band noted on immunofixation   -Please check ASO and TPMT studies   -Would consider initiation of MTX as steroid sparing agent outpatient   -Needs outpatient DEXA   -Pt will need outpatient f/u with Dr. House after discharge (we will help coordinate appt)   -No contraindication to d/c from rheum perspective     Discussed with primary team  Discussed with Dr. Thanh Adamson MD  Rheumatology Fellow  Available on TEAMS  64F PMH HTN, MAURICIO with symptoms of palpitations, odynophagia, dysphagia, myalgias, weight loss. Rheumatology consulted to evaluate for autoimmune disease    # Inflammatory arthritis (hands/wrists, shoulders, feet)   # Myalgias   # Pericardial and pleural effusion  # Odynophagia ?cricoarytenoid involvement   # Normocytic anemia  # SSA and Ro52 positive  # Elevated inflammatory markers   # Tachyarrhythmia - paroxysmal SVT     -Pt with several months of odynophagia, then developed migratory and additive inflammatory arthritis (reports hands/wrists, shoulders, feet) and myalgias  -Strong family hx of rheumatologic disease -- RA in her aunt and cousin   -SSA >8, Ro52 positive to 156, Ro60 negative. Previously NORMA was positive 1:1280, repeat here negative, dsDNA, EBENEZER, C3, C4, RF, CCP, SCL-70, centromere, RNA terra 3, ANCA, Christin-1, APS serologies, MPO, PR3 negative  -Prior CK and myoglobin were low, with normal aldolase  -Has elevated , , and elevated ferritin 1179  -Pt also has anemia, pt has hx of anemia from menorrhagia but s/p hysterectomy for fibroids in 2009. Pt had mammo and pap last yr. Colonoscopy 2011 reportedly normal. Likely anemia of chronic disease   -Pt previously had barium esophogram, which showed few contractions, no strictures. EGD showed gastritis, esophagus normal. Pending outpt esophageal manometry  -CT chest with small pleural effusions and moderate pericardial effusion, TTE with multiple pericardial effusions noted     At this time, pt has elevated inflammatory markers, Ro 52 positive and constellation of symptoms of odynophagia, inflammatory arthritis, myalgias, and serositis. At this time, she does not fit into a specific autoimmune diagnosis. Some considerations include seronegative RA (given small joint involvement with odynophagia ?cricoarytenoid involvement, though serositis is less common in RA), SLE (in setting of serositis, though sub serologies are negative), Sjogren's (lacks classic sicca symptoms), and Still's disease (though is a diagnosis of exclusion and lacks other features such as rash, lymphadenopathy, transaminitis, etc.). She was started on steroid trial Solumedrol 40 mg IV daily (4/18 - ) with significant clinical improvement     Recommendations:  -F/u G6PD and quantiferon   -Please transition to prednisone 40 mg daily on discharge   -C/w GI prophylaxis   -Recommend Heme/Onc f/u given IgG lambda band noted on immunofixation   -Please check ASO and TPMT studies   -Would consider initiation of MTX as steroid sparing agent outpatient   -Needs outpatient DEXA   -Would repeat RF and CCP in a few months   -Pt will need outpatient f/u with Dr. House after discharge (we will help coordinate appt)   -No contraindication to d/c from rheum perspective     Discussed with primary team  Discussed with Dr. Thanh Adamson MD  Rheumatology Fellow  Available on TEAMS  64F PMH HTN, MAURICIO with symptoms of palpitations, odynophagia, dysphagia, myalgias, weight loss. Rheumatology consulted to evaluate for autoimmune disease    # Inflammatory arthritis (hands/wrists, shoulders, feet)   # Myalgias   # Pericardial and pleural effusion  # Odynophagia ?cricoarytenoid involvement   # Normocytic anemia  # SSA and Ro52 positive  # Elevated inflammatory markers   # Tachyarrhythmia - paroxysmal SVT     -Pt with several months of odynophagia, then developed migratory and additive inflammatory arthritis (reports hands/wrists, shoulders, feet)   -Strong family hx of rheumatologic disease -- RA in her aunt and cousin   -SSA >8, Ro52 positive to 156, Ro60 negative. Previously NORMA was positive 1:1280, repeat here negative, dsDNA, EBENEZER, C3, C4, RF, CCP, SCL-70, centromere, RNA terra 3, ANCA, Christin-1, APS serologies, MPO, PR3 negative  -Prior CK and myoglobin were low, with normal aldolase  -Has elevated , , and elevated ferritin 1179  -Pt also has anemia, pt has hx of anemia from menorrhagia but s/p hysterectomy for fibroids in 2009. Pt had mammo and pap last yr. Colonoscopy 2011 reportedly normal. Likely anemia of chronic disease   -Pt previously had barium esophogram, which showed few contractions, no strictures. EGD showed gastritis, esophagus normal. Pending outpt esophageal manometry  -CT chest with small pleural effusions and moderate pericardial effusion, TTE with multiple pericardial effusions noted     At this time, pt has elevated inflammatory markers, Ro 52 positive and constellation of symptoms of odynophagia, inflammatory arthritis, myalgias, and serositis. At this time, she does not fit into a specific autoimmune diagnosis. Some considerations include seronegative RA (given small joint involvement with odynophagia ?cricoarytenoid involvement, though serositis is less common in RA), SLE (in setting of serositis, though sub serologies are negative), Sjogren's (lacks classic sicca symptoms), and Still's disease (though is a diagnosis of exclusion and lacks other features such as rash, lymphadenopathy, transaminitis, etc.). She was started on steroid trial Solumedrol 40 mg IV daily (4/18 - ) with significant clinical improvement     Recommendations:  -F/u G6PD and quantiferon   -Please transition to prednisone 40 mg daily on discharge   -C/w GI prophylaxis   -Recommend Heme/Onc f/u given IgG lambda band noted on immunofixation   -Please check ASO and TPMT studies   -Would consider initiation of MTX as steroid sparing agent outpatient   -Needs outpatient DEXA   -Would repeat RF and CCP in a few months   -Pt will need outpatient f/u with Dr. House after discharge (we will help coordinate appt)   -No contraindication to d/c from rheum perspective     Discussed with primary team  Discussed with Dr. Thanh Adamson MD  Rheumatology Fellow  Available on TEAMS

## 2025-04-21 NOTE — DISCHARGE NOTE PROVIDER - ATTENDING DISCHARGE PHYSICAL EXAMINATION:
CONSTITUTIONAL: NAD  RESPIRATORY: Normal respiratory effort; lungs are clear to auscultation bilaterally  CARDIOVASCULAR: Regular rate and rhythm,  no murmur/rub/gallop  ABDOMEN: Nontender to palpation, normoactive bowel sounds  MUSCLOSKELETAL: No LE edema, no cyanosis or clubbing. improved ROM  PSYCH: A+O x 3; affect appropriate  NEURO: No new FND

## 2025-04-21 NOTE — PROGRESS NOTE ADULT - PROBLEM SELECTOR PLAN 3
Likely etiology of chest pain  - TTE with small-mod pericardial effusion and thickened pericardium. No e/u tamponade.  - Repeat TTE 4/19 stable pericardial effusion  - Cardiology following, Continue colchicine  - Tele monitoring
Likely etiology of chest pain  - TTE with small-mod pericardial effusion and thickened pericardium. No e/u tamponade.  - Cardiology following, started on colchicine  - Repeat TTE 4/19 ro reassess pericardial effusion
Likely etiology of chest pain  - TTE with small-mod pericardial effusion and thickened pericardium. No e/u tamponade.  - Repeat TTE 4/19 stable pericardial effusion  - Cardiology following, Continue colchicine  - Tele monitoring
- EKG without acute ST-T wave changes, troponin neg, negative stress test 4/14  - CTA negative for PE, LE dopplers negative for DVT   - Pericardial effusion on echo   - Suspect MSK chest pain
Likely etiology of chest pain  - TTE with small-mod pericardial effusion and thickened pericardium. No e/u tamponade.  - Cardiology following, Continue colchicine  - Repeat TTE 4/19 to re-assess pericardial effusion

## 2025-04-21 NOTE — DISCHARGE NOTE PROVIDER - PROVIDER TOKENS
PROVIDER:[TOKEN:[[id number]],FOLLOWUP:[1 week]],PROVIDER:[TOKEN:[[id number]],FOLLOWUP:[Routine]]

## 2025-04-21 NOTE — DISCHARGE NOTE PROVIDER - HOSPITAL COURSE
HPI:  64F estate planning  hx HTN, MAURICIO previously on IV iron, recent admission for odynophagia/dysphagia, elevated inflammatory markers presents with palpitations and chest pain. Reports symptoms started last night at around 12:30 am, intermittent, pt was in bed trying to sleep but awoke from her symptoms which prompted her to come to the ED. Reports associated dyspnea. Also reports odynophagia/dysphagia, worked as an outpt/prior admission,  had recent EGD and was scheduled to have an outpt manommetry and gastric emptying study. Denies sob, abdominal pain, n/v, diarrhea, cough, chills, no recent travel hx or sick contracts. ,     (16 Apr 2025 12:51)    Hospital Course:  Patient was admitted with leukocytosis and met SIRS criteria. Infectious workup unrevealing, per Infectious Disease less likely infectious etiology, suspect autoimmune and patient was monitored off antibiotics. Rheumatology following for Undifferentiated connective tissue disease vs Polymyalgia rheumatica. Started IV Solumedrol and transitioned to prednisone 40mg daily with concomitant PPI for prophylaxis. Was seen for Pericarditis, likely etiology of chest pain, TTE with small-mod pericardial effusion and thickened pericardium. No evidence of tamponade, continue with colchicine. Patient was complaining of odynophagia, was seen by GI outpatient and s/p EGD with barium swallow, will have outpatient manometry/ gastric emptying study.    Patient is medically cleared for discharge. Medication reconciliation reviewed, revised, and resolved with Dr. Egan who had medically cleared patient for discharge with follow-up as advised. Patient is currently stable for discharge to ___ at this time.    Important Medication Changes and Reason:  Please see medication reconciliation for any medication changes    Active or Pending Issues Requiring Follow-up:  - PCP  - Recommend Heme/Onc f/u given IgG lambda band noted on immunofixation   - Pt will need outpatient f/u with Dr. House (Rheumatology) after discharge   - Seen by GI, no further inpatient recommendations. Follow up outpatient manommetry/gastric emptying study.     Advanced Directives:   [x] Full code  [ ] DNR  [ ] Hospice    Discharge Diagnoses:  SIRS  Undifferentiated connective tissue disease  Pericarditis  Tachyarrhythmia  Odynophagia  Hyponatremia  Anemia HPI:  64F estate planning  hx HTN, MAURICIO previously on IV iron, recent admission for odynophagia/dysphagia, elevated inflammatory markers presents with palpitations and chest pain. Reports symptoms started last night at around 12:30 am, intermittent, pt was in bed trying to sleep but awoke from her symptoms which prompted her to come to the ED. Reports associated dyspnea. Also reports odynophagia/dysphagia, worked as an outpt/prior admission,  had recent EGD and was scheduled to have an outpt manommetry and gastric emptying study. Denies sob, abdominal pain, n/v, diarrhea, cough, chills, no recent travel hx or sick contracts. ,     (16 Apr 2025 12:51)    Hospital Course:  Patient was admitted with leukocytosis and met SIRS criteria. Infectious workup unrevealing, per Infectious Disease less likely infectious etiology, suspect autoimmune and patient was monitored off antibiotics. Rheumatology following for Undifferentiated connective tissue disease vs Polymyalgia rheumatica. Started IV Solumedrol and transitioned to prednisone 40mg daily with concomitant PPI for prophylaxis. Was seen for Pericarditis, likely etiology of chest pain, TTE with small-mod pericardial effusion and thickened pericardium. No evidence of tamponade, continue with colchicine. Patient was complaining of odynophagia, was seen by GI outpatient and s/p EGD with barium swallow, will have outpatient manometry/ gastric emptying study.    Patient is medically cleared for discharge. Medication reconciliation reviewed, revised, and resolved with Dr. Egan who had medically cleared patient for discharge with follow-up as advised. Patient is currently stable for discharge to home at this time.    Important Medication Changes and Reason:  Please see medication reconciliation for any medication changes    Active or Pending Issues Requiring Follow-up:  - PCP  - Recommend Heme/Onc f/u given IgG lambda band noted on immunofixation   - Pt will need outpatient f/u with Dr. House (Rheumatology) after discharge   - Seen by GI, no further inpatient recommendations. Follow up outpatient manommetry/gastric emptying study.     Advanced Directives:   [x] Full code  [ ] DNR  [ ] Hospice    Discharge Diagnoses:  SIRS  Undifferentiated connective tissue disease  Pericarditis  Tachyarrhythmia  Odynophagia  Hyponatremia  Anemia   HPI:  64F estate planning  hx HTN, MAURICIO previously on IV iron, recent admission for odynophagia/dysphagia, elevated inflammatory markers presents with palpitations and chest pain. Reports symptoms started last night at around 12:30 am, intermittent, pt was in bed trying to sleep but awoke from her symptoms which prompted her to come to the ED. Reports associated dyspnea. Also reports odynophagia/dysphagia, worked as an outpt/prior admission,  had recent EGD and was scheduled to have an outpt manommetry and gastric emptying study. Denies sob, abdominal pain, n/v, diarrhea, cough, chills, no recent travel hx or sick contracts. ,     (16 Apr 2025 12:51)    Hospital Course:  Patient was admitted with leukocytosis and met SIRS criteria. Infectious workup unrevealing, per Infectious Disease less likely infectious etiology, suspect autoimmune and patient was monitored off antibiotics. Rheumatology following for Undifferentiated connective tissue disease vs Polymyalgia rheumatica. Started IV Solumedrol and transitioned to prednisone 40mg daily with concomitant PPI for prophylaxis. Was seen for Pericarditis, likely etiology of chest pain, TTE with small-mod pericardial effusion and thickened pericardium. No evidence of tamponade, continue with colchicine. Patient was complaining of odynophagia, was seen by GI outpatient and s/p EGD with barium swallow, will have outpatient manometry/ gastric emptying study.    Patient is medically cleared for discharge. Medication reconciliation reviewed, revised, and resolved with Dr. Egan who had medically cleared patient for discharge with follow-up as advised. Patient is currently stable for discharge to home at this time.    Important Medication Changes and Reason:  Please see medication reconciliation for any medication changes    Active or Pending Issues Requiring Follow-up:  - PCP  - Recommend Heme/Onc f/u given IgG lambda band noted on immunofixation   - Pt will need outpatient f/u with Dr. House (Rheumatology) after discharge   - Seen by GI, no further inpatient recommendations. Follow up outpatient manommetry/gastric emptying study.   - outpatient ofllow up with liver enzymes    Advanced Directives:   [x] Full code  [ ] DNR  [ ] Hospice    Discharge Diagnoses:  SIRS  Undifferentiated connective tissue disease  Pericarditis  Tachyarrhythmia  Odynophagia  Hyponatremia  Anemia   HPI:  64F estate planning  hx HTN, MAURICIO previously on IV iron, recent admission for odynophagia/dysphagia, elevated inflammatory markers presents with palpitations and chest pain. Reports symptoms started last night at around 12:30 am, intermittent, pt was in bed trying to sleep but awoke from her symptoms which prompted her to come to the ED. Reports associated dyspnea. Also reports odynophagia/dysphagia, worked as an outpt/prior admission,  had recent EGD and was scheduled to have an outpt manommetry and gastric emptying study. Denies sob, abdominal pain, n/v, diarrhea, cough, chills, no recent travel hx or sick contracts. ,     (16 Apr 2025 12:51)    Hospital Course:  Patient was admitted with leukocytosis and met SIRS criteria. Infectious workup unrevealing, per Infectious Disease less likely infectious etiology, suspect autoimmune and patient was monitored off antibiotics. Rheumatology following for Undifferentiated connective tissue disease vs Polymyalgia rheumatica. Started IV Solumedrol and transitioned to prednisone 40mg daily with concomitant PPI for prophylaxis. Was seen for Pericarditis, likely etiology of chest pain, TTE with small-mod pericardial effusion and thickened pericardium. No evidence of tamponade, continue with colchicine. Patient was complaining of odynophagia, was seen by GI outpatient and s/p EGD with barium swallow, will have outpatient manometry/ gastric emptying study. Had mild transaminitis likely 2/2 to colchicine use. Colchicine AZ'ed and patient will have outpatient LFTs in 1 week.    Patient is medically cleared for discharge. Medication reconciliation reviewed, revised, and resolved with Dr. Egan who had medically cleared patient for discharge with follow-up as advised. Patient is currently stable for discharge to home at this time.    Important Medication Changes and Reason:  Please see medication reconciliation for any medication changes    Active or Pending Issues Requiring Follow-up:  - PCP for LFTs in 1 week  - Recommend Heme/Onc f/u given IgG lambda band noted on immunofixation   - Pt will need outpatient f/u with Dr. House (Rheumatology) after discharge   - Seen by GI, no further inpatient recommendations. Follow up outpatient manommetry/gastric emptying study.   - outpatient ofllow up with liver enzymes    Advanced Directives:   [x] Full code  [ ] DNR  [ ] Hospice    Discharge Diagnoses:  SIRS  Undifferentiated connective tissue disease  Pericarditis  Tachyarrhythmia  Odynophagia  Hyponatremia  Anemia

## 2025-04-21 NOTE — DISCHARGE NOTE PROVIDER - NSDCFUSCHEDAPPT_GEN_ALL_CORE_FT
Katy Ramirez  [facility]  CARDIOLOGY [address]  Scheduled Appointment: 04/25/2025    Linda Zapata  [facility]  OTOLARYNG [address]  Scheduled Appointment: 05/14/2025    Coy Raza  [facility]  ELECTROPH [address]  Scheduled Appointment: 05/29/2025     Katy Ramirez  [facility]  CARDIOLOGY [address]  Scheduled Appointment: 04/25/2025    Nieves Lyman  [facility]  INTMED [address]  Scheduled Appointment: 05/06/2025    Linda Zapata  [facility]  [address] [facility]  Scheduled Appointment: 05/14/2025    Coy Raza  [facility]  ELECTROPH [address]  Scheduled Appointment: 05/29/2025

## 2025-04-21 NOTE — PROGRESS NOTE ADULT - TIME BILLING
- Reviewing, and interpreting labs and testing.  - Independently obtaining a review of systems and performing a physical exam  - Reviewing consultant documentation/recommendations in addition to discussing plan of care with consultants.  - Counselling and educating patient and family regarding interpretation of aforementioned items and plan of care.  - This excludes time spent teaching residents/medical students
- Ordering, reviewing, and interpreting labs, testing, and imaging.  - Independently obtaining a review of systems and performing a physical exam  - Reviewing consultant documentation/recommendations in addition to discussing plan of care with consultants.  - Counselling and educating patient and family regarding interpretation of aforementioned items and plan of care.
- Reviewing, and interpreting labs and testing.  - Independently obtaining a review of systems and performing a physical exam  - Reviewing consultant documentation/recommendations in addition to discussing plan of care with consultants.  - Counselling and educating patient and family regarding interpretation of aforementioned items and plan of care.  - This excludes time spent teaching residents/medical students

## 2025-04-21 NOTE — PROGRESS NOTE ADULT - PROBLEM SELECTOR PROBLEM 2
Undifferentiated connective tissue disease
Undifferentiated connective tissue disease
Tachyarrhythmia
Undifferentiated connective tissue disease
Undifferentiated connective tissue disease

## 2025-04-21 NOTE — PROGRESS NOTE ADULT - PROBLEM SELECTOR PLAN 6
- W/u consistent with AOCD with ferritin level 1179  - Stop IV iron and transfuse hgb <7.0  - Hematology following
Likely hypovolemic hyponatremia   - Encourage po intake   - Trend na levels
Likely hypovolemic hyponatremia   - Resolved  - Encourage po intake
Likely hypovolemic hyponatremia   - Resolved  - Encourage po intake
Likely hypovolemic hyponatremia   - Encourage po intake   - Trend na levels

## 2025-04-21 NOTE — PROGRESS NOTE ADULT - ASSESSMENT
65 yo F with PMH of HTN, MAURICIO, and recent pneumonia presenting for odynophagia, dysphagia, dyspnea, and weakness for 3  months, found to have elevated CRP, microcytic anemia, and positive NORMA c/f systemic process.     Impression:  #Odynophagia, Dysphagia  #Iron Deficiency Anemia  #Myalgias dyspnea, weakness  #Positive inflammatory markers    P/w systemic symptoms for 2-3 months with dyspnea, tachycardia, myalgias and weakness. She endorses dysphagia to solid>liquids and odynophagia with any swallowing even saliva for the same time period. On admission, pt found to have low grade temp to 100.2 and mild tachycardia with wbc 14.2. Labs notable for Hgb 8 (prior baseline 10.4 1/2025), MCV 73.2, , proteinuria, and albumin 2.9.  Workup at OSH notable for NORMA 1:1280. Barium esophagram at [facility] on 3/5 showed a few tertiary contractions commiserate with age and no stricture or mucosal abnormality. 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 Rheumatological disorders, such as scleroderma, Sjogren's syndrome, systemic lupus erythematosus, rheumatoid arthritis, sarcoidosis, Behcet's disease, anti-neutrophil cytoplasmic antibody (ANCA)-associated vasculitis, or granulomatosis with polyangiitis, have been associated with dysphagia. Dermatomyositis and polymyositis also on ddx. Structural cause such as adenocarcinoma also possible though systemic cause more likely given pt's symptoms. Thyroid disorder also on ddx. 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 Her last EGD/colonoscopy was 2011 which showed gastritis and hemorrhoids but she has not had repeat evaluation since she was found to have MAURICIO.    S/P EGD 3/10, normal appearing esophagus and gastritis, biopsied.   was discharged but states she has gotten worse since March with weakness, edema, dysphagia, cp, gage.  noted to have an elevated wbc  no eosinophil  non toxic  febrile in er but not since    Dysphagia  Chest pain  with elevated trop   edema  Elevated NORMA and inflammatory markers  improvement with steroids in the past      no evidence of infection   much better on empiric steroids  QuantiFeron not  done  probably too late now that she is on steroids  elevated wbc notd

## 2025-04-21 NOTE — DISCHARGE NOTE PROVIDER - CARE PROVIDER_API CALL
Nieves Lyman  Internal Medicine  [address]  [place] [address]  Phone: [phone]  Fax: [phone]  Follow Up Time: 1 week    Tiera House  Rheumatology  [address], [address]  [place] [address]  Phone: [phone]  Fax: [phone]  Follow Up Time: Routine

## 2025-04-21 NOTE — PROGRESS NOTE ADULT - PROBLEM SELECTOR PLAN 5
Hx of odynophagia   - S/p EGD 3/25 and barium swallow  - Seen by GI, no further inpatient recommendations. Follow up outpatient manommetry/gastric emptying study   - Soft diet  - Steroids per above
Hx of odynophagia   - S/p EGD 3/25 and barium swallow  - Seen by GI, no further inpatient recommendations. Follow up outpatient manommetry/gastric emptying study.   - Soft diet  - Steroids per above
Likely hypovolemic hyponatremia   - Encourage po intake   - Trend na levels
Hx of odynophagia   - S/p EGD 3/25 and barium swallow  - Seen by GI, no further inpatient recommendations. Follow up outpatient manommetry/gastric emptying study   - Soft diet  - Steroids per above
Hx of odynophagia   - S/p EGD 3/25 and barium swallow  - Seen by GI, no further inpatient recommendations. Follow up outpatient manommetry/gastric emptying study.   - Soft diet  - Steroids per above

## 2025-04-21 NOTE — PROGRESS NOTE ADULT - SUBJECTIVE AND OBJECTIVE BOX
Lizabeth Egan MD  [facility] Medicine    SUBJECTIVE / OVERNIGHT EVENTS:  - no events overnight, no n/v/abd pain/cough. joint pains are improving overall, weakness is improving. able to raise b/l arms    MEDICATIONS  (STANDING):  albuterol    90 MICROgram(s) HFA Inhaler 2 Puff(s) Inhalation every 6 hours  colchicine 0.6 milliGRAM(s) Oral two times a day  dextrose 5%. 1000 milliLiter(s) (100 mL/Hr) IV Continuous <Continuous>  dextrose 5%. 1000 milliLiter(s) (50 mL/Hr) IV Continuous <Continuous>  dextrose 50% Injectable 25 Gram(s) IV Push once  dextrose 50% Injectable 12.5 Gram(s) IV Push once  dextrose 50% Injectable 25 Gram(s) IV Push once  dextrose Oral Gel 15 Gram(s) Oral once  flecainide 50 milliGRAM(s) Oral every 12 hours  gabapentin 300 milliGRAM(s) Oral at bedtime  glucagon  Injectable 1 milliGRAM(s) IntraMuscular once  heparin   Injectable 5000 Unit(s) SubCutaneous every 8 hours  influenza   Vaccine 0.5 milliLiter(s) IntraMuscular once  methylPREDNISolone sodium succinate Injectable 40 milliGRAM(s) IV Push daily  metoprolol succinate ER 50 milliGRAM(s) Oral daily  pantoprazole    Tablet 40 milliGRAM(s) Oral before breakfast    MEDICATIONS  (PRN):  acetaminophen     Tablet .. 650 milliGRAM(s) Oral every 6 hours PRN Temp greater or equal to 38C (100.4F), Mild Pain (1 - 3)  albuterol/ipratropium for Nebulization 3 milliLiter(s) Nebulizer every 6 hours PRN Bronchospasm      I&O's Summary    20 Apr 2025 07:01  -  21 Apr 2025 07:00  --------------------------------------------------------  IN: 0 mL / OUT: 1100 mL / NET: -1100 mL    21 Apr 2025 07:01  -  21 Apr 2025 14:54  --------------------------------------------------------  IN: 480 mL / OUT: 1700 mL / NET: -1220 mL        PHYSICAL EXAM:  Vital Signs Last 24 Hrs  T(C): 36.8 (21 Apr 2025 11:00), Max: 36.8 (21 Apr 2025 11:00)  T(F): 98.2 (21 Apr 2025 11:00), Max: 98.2 (21 Apr 2025 11:00)  HR: 72 (21 Apr 2025 11:00) (70 - 72)  BP: 147/75 (21 Apr 2025 11:00) (147/75 - 154/74)  BP(mean): --  RR: 18 (21 Apr 2025 11:00) (18 - 18)  SpO2: 93% (21 Apr 2025 11:00) (93% - 95%)    Parameters below as of 21 Apr 2025 11:00  Patient On (Oxygen Delivery Method): room air      CONSTITUTIONAL: NAD  RESPIRATORY: Normal respiratory effort; lungs are clear to auscultation bilaterally  CARDIOVASCULAR: Regular rate and rhythm,  no murmur/rub/gallop  ABDOMEN: Nontender to palpation, normoactive bowel sounds  MUSCLOSKELETAL: No LE edema, no cyanosis or clubbing. improved ROM  PSYCH: A+O x 3; affect appropriate  NEURO: No new FND    LABS:                        7.9    14.26 )-----------( 444      ( 21 Apr 2025 07:25 )             26.2     04-21    134[L]  |  98  |  19  ----------------------------<  71  4.5   |  25  |  0.63    Ca    8.5      21 Apr 2025 07:25            Urinalysis Basic - ( 21 Apr 2025 07:25 )    Color: x / Appearance: x / SG: x / pH: x  Gluc: 71 mg/dL / Ketone: x  / Bili: x / Urobili: x   Blood: x / Protein: x / Nitrite: x   Leuk Esterase: x / RBC: x / WBC x   Sq Epi: x / Non Sq Epi: x / Bacteria: x

## 2025-04-21 NOTE — PROGRESS NOTE ADULT - PROBLEM SELECTOR PLAN 4
#SVT  - Seen by cardiology and EP  - Continue flecainide 50 mg BID and toprol 50 m qd  - Tele monitoring
Hx of odynophagia   - S/p EGD 3/25 and barium swallow  - Seen by GI, no further inpatient recommendations. Follow up outpatient manommetry/gastric emptying study   - Soft diet
#SVT  - Seen by cardiology and EP  - Continue flecainide 50 mg BID and toprol 50 m qd  - Tele monitoring

## 2025-04-22 ENCOUNTER — TRANSCRIPTION ENCOUNTER (OUTPATIENT)
Age: 65
End: 2025-04-22

## 2025-04-22 VITALS
HEART RATE: 70 BPM | OXYGEN SATURATION: 96 % | DIASTOLIC BLOOD PRESSURE: 71 MMHG | RESPIRATION RATE: 18 BRPM | TEMPERATURE: 98 F | SYSTOLIC BLOOD PRESSURE: 130 MMHG

## 2025-04-22 LAB
ALBUMIN SERPL ELPH-MCNC: 2.8 G/DL — LOW (ref 3.3–5)
ALP SERPL-CCNC: 160 U/L — HIGH (ref 40–120)
ALT FLD-CCNC: 137 U/L — HIGH (ref 10–45)
ANION GAP SERPL CALC-SCNC: 11 MMOL/L — SIGNIFICANT CHANGE UP (ref 5–17)
ASO AB SER QL: <20 IU/ML — SIGNIFICANT CHANGE UP (ref 0–199)
AST SERPL-CCNC: 98 U/L — HIGH (ref 10–40)
BILIRUB SERPL-MCNC: 0.2 MG/DL — SIGNIFICANT CHANGE UP (ref 0.2–1.2)
BUN SERPL-MCNC: 20 MG/DL — SIGNIFICANT CHANGE UP (ref 7–23)
CALCIUM SERPL-MCNC: 9.1 MG/DL — SIGNIFICANT CHANGE UP (ref 8.4–10.5)
CHLORIDE SERPL-SCNC: 98 MMOL/L — SIGNIFICANT CHANGE UP (ref 96–108)
CO2 SERPL-SCNC: 26 MMOL/L — SIGNIFICANT CHANGE UP (ref 22–31)
CREAT SERPL-MCNC: 0.58 MG/DL — SIGNIFICANT CHANGE UP (ref 0.5–1.3)
EGFR: 102 ML/MIN/1.73M2 — SIGNIFICANT CHANGE UP
EGFR: 102 ML/MIN/1.73M2 — SIGNIFICANT CHANGE UP
GAMMA INTERFERON BACKGROUND BLD IA-ACNC: 0.06 IU/ML — SIGNIFICANT CHANGE UP
GLUCOSE BLDC GLUCOMTR-MCNC: 119 MG/DL — HIGH (ref 70–99)
GLUCOSE BLDC GLUCOMTR-MCNC: 90 MG/DL — SIGNIFICANT CHANGE UP (ref 70–99)
GLUCOSE SERPL-MCNC: 102 MG/DL — HIGH (ref 70–99)
HCT VFR BLD CALC: 30.8 % — LOW (ref 34.5–45)
HGB BLD-MCNC: 9.1 G/DL — LOW (ref 11.5–15.5)
M TB IFN-G BLD-IMP: NEGATIVE — SIGNIFICANT CHANGE UP
M TB IFN-G CD4+ BCKGRND COR BLD-ACNC: 0.01 IU/ML — SIGNIFICANT CHANGE UP
M TB IFN-G CD4+CD8+ BCKGRND COR BLD-ACNC: 0 IU/ML — SIGNIFICANT CHANGE UP
MAGNESIUM SERPL-MCNC: 2 MG/DL — SIGNIFICANT CHANGE UP (ref 1.6–2.6)
MCHC RBC-ENTMCNC: 22.4 PG — LOW (ref 27–34)
MCHC RBC-ENTMCNC: 29.5 G/DL — LOW (ref 32–36)
MCV RBC AUTO: 75.7 FL — LOW (ref 80–100)
NRBC BLD AUTO-RTO: 0 /100 WBCS — SIGNIFICANT CHANGE UP (ref 0–0)
PHOSPHATE SERPL-MCNC: 2.9 MG/DL — SIGNIFICANT CHANGE UP (ref 2.5–4.5)
PLATELET # BLD AUTO: 452 K/UL — HIGH (ref 150–400)
POTASSIUM SERPL-MCNC: 5.1 MMOL/L — SIGNIFICANT CHANGE UP (ref 3.5–5.3)
POTASSIUM SERPL-SCNC: 5.1 MMOL/L — SIGNIFICANT CHANGE UP (ref 3.5–5.3)
PROT SERPL-MCNC: 6.8 G/DL — SIGNIFICANT CHANGE UP (ref 6–8.3)
QUANT TB PLUS MITOGEN MINUS NIL: 0.73 IU/ML — SIGNIFICANT CHANGE UP
RBC # BLD: 4.07 M/UL — SIGNIFICANT CHANGE UP (ref 3.8–5.2)
RBC # FLD: 22.7 % — HIGH (ref 10.3–14.5)
SODIUM SERPL-SCNC: 135 MMOL/L — SIGNIFICANT CHANGE UP (ref 135–145)
WBC # BLD: 13.87 K/UL — HIGH (ref 3.8–10.5)
WBC # FLD AUTO: 13.87 K/UL — HIGH (ref 3.8–10.5)

## 2025-04-22 PROCEDURE — 85652 RBC SED RATE AUTOMATED: CPT

## 2025-04-22 PROCEDURE — 84484 ASSAY OF TROPONIN QUANT: CPT

## 2025-04-22 PROCEDURE — 93970 EXTREMITY STUDY: CPT

## 2025-04-22 PROCEDURE — 82803 BLOOD GASES ANY COMBINATION: CPT

## 2025-04-22 PROCEDURE — 86480 TB TEST CELL IMMUN MEASURE: CPT

## 2025-04-22 PROCEDURE — 87637 SARSCOV2&INF A&B&RSV AMP PRB: CPT

## 2025-04-22 PROCEDURE — 84295 ASSAY OF SERUM SODIUM: CPT

## 2025-04-22 PROCEDURE — A9585: CPT

## 2025-04-22 PROCEDURE — 99239 HOSP IP/OBS DSCHRG MGMT >30: CPT

## 2025-04-22 PROCEDURE — 83935 ASSAY OF URINE OSMOLALITY: CPT

## 2025-04-22 PROCEDURE — 82728 ASSAY OF FERRITIN: CPT

## 2025-04-22 PROCEDURE — 71045 X-RAY EXAM CHEST 1 VIEW: CPT

## 2025-04-22 PROCEDURE — 83605 ASSAY OF LACTIC ACID: CPT

## 2025-04-22 PROCEDURE — 72158 MRI LUMBAR SPINE W/O & W/DYE: CPT | Mod: MC

## 2025-04-22 PROCEDURE — 81003 URINALYSIS AUTO W/O SCOPE: CPT

## 2025-04-22 PROCEDURE — 86235 NUCLEAR ANTIGEN ANTIBODY: CPT

## 2025-04-22 PROCEDURE — 86704 HEP B CORE ANTIBODY TOTAL: CPT

## 2025-04-22 PROCEDURE — 84100 ASSAY OF PHOSPHORUS: CPT

## 2025-04-22 PROCEDURE — 84436 ASSAY OF TOTAL THYROXINE: CPT

## 2025-04-22 PROCEDURE — 83516 IMMUNOASSAY NONANTIBODY: CPT

## 2025-04-22 PROCEDURE — 84165 PROTEIN E-PHORESIS SERUM: CPT

## 2025-04-22 PROCEDURE — 80074 ACUTE HEPATITIS PANEL: CPT

## 2025-04-22 PROCEDURE — 99232 SBSQ HOSP IP/OBS MODERATE 35: CPT

## 2025-04-22 PROCEDURE — 82955 ASSAY OF G6PD ENZYME: CPT

## 2025-04-22 PROCEDURE — 36415 COLL VENOUS BLD VENIPUNCTURE: CPT

## 2025-04-22 PROCEDURE — 83036 HEMOGLOBIN GLYCOSYLATED A1C: CPT

## 2025-04-22 PROCEDURE — 84433 ASY THIOPURIN S-MTHYLTRNSFRS: CPT

## 2025-04-22 PROCEDURE — 84300 ASSAY OF URINE SODIUM: CPT

## 2025-04-22 PROCEDURE — 82607 VITAMIN B-12: CPT

## 2025-04-22 PROCEDURE — 74177 CT ABD & PELVIS W/CONTRAST: CPT | Mod: MC

## 2025-04-22 PROCEDURE — 82746 ASSAY OF FOLIC ACID SERUM: CPT

## 2025-04-22 PROCEDURE — 86060 ANTISTREPTOLYSIN O TITER: CPT

## 2025-04-22 PROCEDURE — 99285 EMERGENCY DEPT VISIT HI MDM: CPT

## 2025-04-22 PROCEDURE — 86140 C-REACTIVE PROTEIN: CPT

## 2025-04-22 PROCEDURE — 83690 ASSAY OF LIPASE: CPT

## 2025-04-22 PROCEDURE — 83880 ASSAY OF NATRIURETIC PEPTIDE: CPT

## 2025-04-22 PROCEDURE — 87040 BLOOD CULTURE FOR BACTERIA: CPT

## 2025-04-22 PROCEDURE — 82947 ASSAY GLUCOSE BLOOD QUANT: CPT

## 2025-04-22 PROCEDURE — 84155 ASSAY OF PROTEIN SERUM: CPT

## 2025-04-22 PROCEDURE — 82330 ASSAY OF CALCIUM: CPT

## 2025-04-22 PROCEDURE — 85018 HEMOGLOBIN: CPT

## 2025-04-22 PROCEDURE — 84480 ASSAY TRIIODOTHYRONINE (T3): CPT

## 2025-04-22 PROCEDURE — 85730 THROMBOPLASTIN TIME PARTIAL: CPT

## 2025-04-22 PROCEDURE — 86160 COMPLEMENT ANTIGEN: CPT

## 2025-04-22 PROCEDURE — 80048 BASIC METABOLIC PNL TOTAL CA: CPT

## 2025-04-22 PROCEDURE — 93005 ELECTROCARDIOGRAM TRACING: CPT

## 2025-04-22 PROCEDURE — 71275 CT ANGIOGRAPHY CHEST: CPT | Mod: MC

## 2025-04-22 PROCEDURE — 83010 ASSAY OF HAPTOGLOBIN QUANT: CPT

## 2025-04-22 PROCEDURE — 84132 ASSAY OF SERUM POTASSIUM: CPT

## 2025-04-22 PROCEDURE — 83735 ASSAY OF MAGNESIUM: CPT

## 2025-04-22 PROCEDURE — 86334 IMMUNOFIX E-PHORESIS SERUM: CPT

## 2025-04-22 PROCEDURE — G0545: CPT

## 2025-04-22 PROCEDURE — 80053 COMPREHEN METABOLIC PANEL: CPT

## 2025-04-22 PROCEDURE — 85025 COMPLETE CBC W/AUTO DIFF WBC: CPT

## 2025-04-22 PROCEDURE — 83540 ASSAY OF IRON: CPT

## 2025-04-22 PROCEDURE — 84443 ASSAY THYROID STIM HORMONE: CPT

## 2025-04-22 PROCEDURE — 85610 PROTHROMBIN TIME: CPT

## 2025-04-22 PROCEDURE — 96375 TX/PRO/DX INJ NEW DRUG ADDON: CPT

## 2025-04-22 PROCEDURE — 82570 ASSAY OF URINE CREATININE: CPT

## 2025-04-22 PROCEDURE — 82962 GLUCOSE BLOOD TEST: CPT

## 2025-04-22 PROCEDURE — 85027 COMPLETE CBC AUTOMATED: CPT

## 2025-04-22 PROCEDURE — 85014 HEMATOCRIT: CPT

## 2025-04-22 PROCEDURE — 83615 LACTATE (LD) (LDH) ENZYME: CPT

## 2025-04-22 PROCEDURE — 84439 ASSAY OF FREE THYROXINE: CPT

## 2025-04-22 PROCEDURE — 93308 TTE F-UP OR LMTD: CPT

## 2025-04-22 PROCEDURE — 96374 THER/PROPH/DIAG INJ IV PUSH: CPT

## 2025-04-22 PROCEDURE — 97530 THERAPEUTIC ACTIVITIES: CPT

## 2025-04-22 PROCEDURE — 82435 ASSAY OF BLOOD CHLORIDE: CPT

## 2025-04-22 PROCEDURE — 97162 PT EVAL MOD COMPLEX 30 MIN: CPT

## 2025-04-22 PROCEDURE — 83550 IRON BINDING TEST: CPT

## 2025-04-22 PROCEDURE — 82550 ASSAY OF CK (CPK): CPT

## 2025-04-22 RX ORDER — COLCHICINE 0.6 MG/1
1 TABLET, FILM COATED ORAL
Qty: 60 | Refills: 0
Start: 2025-04-22 | End: 2025-05-21

## 2025-04-22 RX ORDER — PREDNISONE 20 MG/1
2 TABLET ORAL
Qty: 60 | Refills: 0
Start: 2025-04-22 | End: 2025-05-21

## 2025-04-22 RX ORDER — GABAPENTIN 400 MG/1
1 CAPSULE ORAL
Qty: 0 | Refills: 0 | DISCHARGE
Start: 2025-04-22

## 2025-04-22 RX ORDER — FLECAINIDE ACETATE 50 MG
1 TABLET ORAL
Qty: 60 | Refills: 0
Start: 2025-04-22 | End: 2025-05-21

## 2025-04-22 RX ORDER — METOPROLOL SUCCINATE 50 MG/1
1 TABLET, EXTENDED RELEASE ORAL
Qty: 30 | Refills: 0
Start: 2025-04-22 | End: 2025-05-21

## 2025-04-22 RX ADMIN — Medication 50 MILLIGRAM(S): at 05:47

## 2025-04-22 RX ADMIN — Medication 40 MILLIGRAM(S): at 05:49

## 2025-04-22 RX ADMIN — COLCHICINE 0.6 MILLIGRAM(S): 0.6 TABLET, FILM COATED ORAL at 05:47

## 2025-04-22 RX ADMIN — HEPARIN SODIUM 5000 UNIT(S): 1000 INJECTION INTRAVENOUS; SUBCUTANEOUS at 05:47

## 2025-04-22 RX ADMIN — PREDNISONE 40 MILLIGRAM(S): 20 TABLET ORAL at 05:47

## 2025-04-22 RX ADMIN — METOPROLOL SUCCINATE 50 MILLIGRAM(S): 50 TABLET, EXTENDED RELEASE ORAL at 05:47

## 2025-04-22 NOTE — DISCHARGE NOTE NURSING/CASE MANAGEMENT/SOCIAL WORK - FINANCIAL ASSISTANCE
[facility] provides services at a reduced cost to those who are determined to be eligible through [facility]’s financial assistance program. Information regarding [facility]’s financial assistance program can be found by going to [email or web address] or by calling [phone].

## 2025-04-22 NOTE — PROGRESS NOTE ADULT - PROVIDER SPECIALTY LIST ADULT
Cardiology
Rheumatology
Cardiology
Cardiology
Electrophysiology
Infectious Disease
Internal Medicine
Cardiology
Infectious Disease
Infectious Disease
Rheumatology
Infectious Disease
Internal Medicine
Hospitalist
Internal Medicine
Internal Medicine

## 2025-04-22 NOTE — DISCHARGE NOTE NURSING/CASE MANAGEMENT/SOCIAL WORK - NSDCPEFALRISK_GEN_ALL_CORE
For information on Fall & Injury Prevention, visit: [email or web address] OR  [email or web address] OR  https://www.cdc.gov/steadi/patient.html

## 2025-04-22 NOTE — DISCHARGE NOTE NURSING/CASE MANAGEMENT/SOCIAL WORK - PATIENT PORTAL LINK FT
You can access the FollowMyHealth Patient Portal offered by [facility] by registering at the following website: [email or web address]. By joining [email or web address] [email or web address] portal, you will also be able to view your health information using other applications (apps) compatible with our system.

## 2025-04-22 NOTE — PROGRESS NOTE ADULT - SUBJECTIVE AND OBJECTIVE BOX
DATE OF SERVICE: 04-22-25 @ 16:03    Patient is a 64y old  Female who presents with a chief complaint of Palpitations (22 Apr 2025 08:59)      INTERVAL HISTORY: Feels ok.     REVIEW OF SYSTEMS:  CONSTITUTIONAL: No weakness  EYES/ENT: No visual changes;  No throat pain   NECK: No pain or stiffness  RESPIRATORY: No cough, wheezing; No shortness of breath  CARDIOVASCULAR: No chest pain or palpitations  GASTROINTESTINAL: No abdominal  pain. No nausea, vomiting, or hematemesis  GENITOURINARY: No dysuria, frequency or hematuria  NEUROLOGICAL: No stroke like symptoms  SKIN: No rashes    TELEMETRY Personally reviewed: SR   	  MEDICATIONS:  flecainide 50 milliGRAM(s) Oral every 12 hours  metoprolol succinate ER 50 milliGRAM(s) Oral daily        PHYSICAL EXAM:  T(C): 36.7 (04-22-25 @ 14:10), Max: 36.8 (04-22-25 @ 11:30)  HR: 70 (04-22-25 @ 14:10) (70 - 73)  BP: 130/71 (04-22-25 @ 14:10) (130/71 - 155/85)  RR: 18 (04-22-25 @ 14:10) (18 - 18)  SpO2: 96% (04-22-25 @ 14:10) (94% - 96%)  Wt(kg): --  I&O's Summary    21 Apr 2025 07:01  -  22 Apr 2025 07:00  --------------------------------------------------------  IN: 480 mL / OUT: 2150 mL / NET: -1670 mL    22 Apr 2025 07:01  -  22 Apr 2025 16:03  --------------------------------------------------------  IN: 0 mL / OUT: 1200 mL / NET: -1200 mL          Appearance: In no distress	  HEENT:    PERRL, EOMI	  Cardiovascular:  S1 S2, No JVD  Respiratory: Lungs clear to auscultation	  Gastrointestinal:  Soft, Non-tender, + BS	  Vascularature:  No edema of LE  Psychiatric: Appropriate affect   Neuro: no acute focal deficits                               9.1    13.87 )-----------( 452      ( 22 Apr 2025 11:24 )             30.8     04-22    135  |  98  |  20  ----------------------------<  102[H]  5.1   |  26  |  0.58    Ca    9.1      22 Apr 2025 11:24  Phos  2.9     04-22  Mg     2.0     04-22    TPro  6.8  /  Alb  2.8[L]  /  TBili  0.2  /  DBili  x   /  AST  98[H]  /  ALT  137[H]  /  AlkPhos  160[H]  04-22        Labs personally reviewed      ASSESSMENT/PLAN: 	    64F with hx HTN, MAURICIO previously on IV iron, recent admission for odynophagia/dysphagia, elevated inflammatory markers presents with palpitations and chest pain.    1. Tachyarrhythmia - pSVT  - EP consulted for rythym control strategy eval   - Per EP - ECGs consistent with SVT with a pathway  - ?precipitated by viral pericarditis   - 4/16 EP started pt on Metoprolol 50mg QD and Flecainide 50mg q12 hrs; pt has been in SR since then  - Continue tele monitoring     2. Chest pain  - Recent NST (4/14/25) normal myocardial perfusion  - TTE 4/17/25 - preserved EF, small to mod pericardial effusion, with no evidence of tamponade, thickened pericardium, b/l pleural effusion (compared to small effusion 3/11/25)  - ProBNP elevated 2944 (compared to 318 last month)   - 4/17 started Colchicine 0.6mg BID for likely pericarditis with TTE findings and pleuritic chest pain  - Repeat echo to assess for progression of pericardial effusion 4/19 noted with stable effusion    3. Systemic inflammatory response syndrome (SIRS).   ·  Plan: Pt with leukocytosis and febrile episode 101 in ED  - Viral panel negative, UA negative for UTI, blood cx NGTD  - CT chest negative for pna   - LE dopplers negative for DVT   - ID following, less likely infectious suspect autoimmune. Monitor off abx.   - Rheumatology consulted - undergoing autoimmune w/u      OP FU with Dr Lyman and Dr Gregory Nielsen, AG-NP   Christopher Gaffney, DO [facility]  Cardiovascular Medicine  [address] [place] [address], [address]  Available through call or text on [employer] TE[employer]  Office: [phone]   DATE OF SERVICE: 04-22-25 @ 16:03    Patient is a 64y old  Female who presents with a chief complaint of Palpitations (22 Apr 2025 08:59)      INTERVAL HISTORY: Feels ok.     REVIEW OF SYSTEMS:  CONSTITUTIONAL: No weakness  EYES/ENT: No visual changes;  No throat pain   NECK: No pain or stiffness  RESPIRATORY: No cough, wheezing; No shortness of breath  CARDIOVASCULAR: No chest pain or palpitations  GASTROINTESTINAL: No abdominal  pain. No nausea, vomiting, or hematemesis  GENITOURINARY: No dysuria, frequency or hematuria  NEUROLOGICAL: No stroke like symptoms  SKIN: No rashes    TELEMETRY Personally reviewed: SR   	  MEDICATIONS:  flecainide 50 milliGRAM(s) Oral every 12 hours  metoprolol succinate ER 50 milliGRAM(s) Oral daily        PHYSICAL EXAM:  T(C): 36.7 (04-22-25 @ 14:10), Max: 36.8 (04-22-25 @ 11:30)  HR: 70 (04-22-25 @ 14:10) (70 - 73)  BP: 130/71 (04-22-25 @ 14:10) (130/71 - 155/85)  RR: 18 (04-22-25 @ 14:10) (18 - 18)  SpO2: 96% (04-22-25 @ 14:10) (94% - 96%)  Wt(kg): --  I&O's Summary    21 Apr 2025 07:01  -  22 Apr 2025 07:00  --------------------------------------------------------  IN: 480 mL / OUT: 2150 mL / NET: -1670 mL    22 Apr 2025 07:01  -  22 Apr 2025 16:03  --------------------------------------------------------  IN: 0 mL / OUT: 1200 mL / NET: -1200 mL          Appearance: In no distress	  HEENT:    PERRL, EOMI	  Cardiovascular:  S1 S2, No JVD  Respiratory: Lungs clear to auscultation	  Gastrointestinal:  Soft, Non-tender, + BS	  Vascularature:  No edema of LE  Psychiatric: Appropriate affect   Neuro: no acute focal deficits                               9.1    13.87 )-----------( 452      ( 22 Apr 2025 11:24 )             30.8     04-22    135  |  98  |  20  ----------------------------<  102[H]  5.1   |  26  |  0.58    Ca    9.1      22 Apr 2025 11:24  Phos  2.9     04-22  Mg     2.0     04-22    TPro  6.8  /  Alb  2.8[L]  /  TBili  0.2  /  DBili  x   /  AST  98[H]  /  ALT  137[H]  /  AlkPhos  160[H]  04-22        Labs personally reviewed      ASSESSMENT/PLAN: 	    64F with hx HTN, MAURICIO previously on IV iron, recent admission for odynophagia/dysphagia, elevated inflammatory markers presents with palpitations and chest pain.    1. Tachyarrhythmia - pSVT  - EP consulted for rythym control strategy eval   - Per EP - ECGs consistent with SVT with a pathway  - ?precipitated by viral pericarditis   - 4/16 EP started pt on Metoprolol 50mg QD and Flecainide 50mg q12 hrs; pt has been in SR since then  - Continue tele monitoring     2. Chest pain  - Recent NST (4/14/25) normal myocardial perfusion  - TTE 4/17/25 - preserved EF, small to mod pericardial effusion, with no evidence of tamponade, thickened pericardium, b/l pleural effusion (compared to small effusion 3/11/25)  - ProBNP elevated 2944 (compared to 318 last month)   - 4/17 started Colchicine 0.6mg BID for likely pericarditis with TTE findings and pleuritic chest pain  - Repeat echo to assess for progression of pericardial effusion 4/19 noted with stable effusion  - Colchicine stopped 2/2 transaminitis     3. Systemic inflammatory response syndrome (SIRS).   ·  Plan: Pt with leukocytosis and febrile episode 101 in ED  - Viral panel negative, UA negative for UTI, blood cx NGTD  - CT chest negative for pna   - LE dopplers negative for DVT   - ID following, less likely infectious suspect autoimmune. Monitor off abx.   - Rheumatology consulted - undergoing autoimmune w/u      OP FU with Dr Lyman and Dr Gregory Nielsen, TYRON-NP   Christopher Gaffney, DO [facility]  Cardiovascular Medicine  [address] [place] [address], [address]  Available through call or text on [employer] TEAM[employer]  Office: [phone]

## 2025-04-22 NOTE — PROGRESS NOTE ADULT - REASON FOR ADMISSION
Palpitations

## 2025-04-22 NOTE — PROGRESS NOTE ADULT - ASSESSMENT
63 yo F with PMH of HTN, MAURICIO, and recent pneumonia presenting for odynophagia, dysphagia, dyspnea, and weakness for 3  months, found to have elevated CRP, microcytic anemia, and positive NORMA c/f systemic process.     Impression:  #Odynophagia, Dysphagia  #Iron Deficiency Anemia  #Myalgias dyspnea, weakness  #Positive inflammatory markers    P/w systemic symptoms for 2-3 months with dyspnea, tachycardia, myalgias and weakness. She endorses dysphagia to solid>liquids and odynophagia with any swallowing even saliva for the same time period. On admission, pt found to have low grade temp to 100.2 and mild tachycardia with wbc 14.2. Labs notable for Hgb 8 (prior baseline 10.4 1/2025), MCV 73.2, , proteinuria, and albumin 2.9.  Workup at OSH notable for NORMA 1:1280. Barium esophagram at [facility] on 3/5 showed a few tertiary contractions commiserate with age and no stricture or mucosal abnormality. 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 Rheumatological disorders, such as scleroderma, Sjogren's syndrome, systemic lupus erythematosus, rheumatoid arthritis, sarcoidosis, Behcet's disease, anti-neutrophil cytoplasmic antibody (ANCA)-associated vasculitis, or granulomatosis with polyangiitis, have been associated with dysphagia. Dermatomyositis and polymyositis also on ddx. Structural cause such as adenocarcinoma also possible though systemic cause more likely given pt's symptoms. Thyroid disorder also on ddx. 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 Her last EGD/colonoscopy was 2011 which showed gastritis and hemorrhoids but she has not had repeat evaluation since she was found to have MAURICIO.    S/P EGD 3/10, normal appearing esophagus and gastritis, biopsied.   was discharged but states she has gotten worse since March with weakness, edema, dysphagia, cp, gage.  noted to have an elevated wbc  no eosinophil  non toxic  febrile in er but not since    Dysphagia  Chest pain  with elevated trop   edema  Elevated NORMA and inflammatory markers  improvement with steroids in the past      no evidence of infection   much better on empiric steroids  QuantiFeron not  done  probably too late now that she is on steroids  elevated wbc noted

## 2025-04-22 NOTE — PROGRESS NOTE ADULT - SUBJECTIVE AND OBJECTIVE BOX
infectious diseases progress note:    Patient is a 64y old  Female who presents with a chief complaint of Palpitations (21 Apr 2025 14:53)        Palpitations          ROS:  CONSTITUTIONAL:  Negative fever or chills, feels well, good appetite  EYES:  Negative  blurry vision or double vision  CARDIOVASCULAR:  Negative for chest pain or palpitations  RESPIRATORY:  Negative for cough, wheezing, or SOB   GASTROINTESTINAL:  Negative for nausea, vomiting, diarrhea, constipation, or abdominal pain  GENITOURINARY:  Negative frequency, urgency or dysuria  NEUROLOGIC:  No headache, confusion, dizziness, lightheadedness    Allergies    NSAIDs (Hives)    Intolerances        ANTIBIOTICS/RELEVANT:  antimicrobials    immunologic:  influenza   Vaccine 0.5 milliLiter(s) IntraMuscular once    OTHER:  acetaminophen     Tablet .. 650 milliGRAM(s) Oral every 6 hours PRN  albuterol    90 MICROgram(s) HFA Inhaler 2 Puff(s) Inhalation every 6 hours  albuterol/ipratropium for Nebulization 3 milliLiter(s) Nebulizer every 6 hours PRN  colchicine 0.6 milliGRAM(s) Oral two times a day  dextrose 5%. 1000 milliLiter(s) IV Continuous <Continuous>  dextrose 5%. 1000 milliLiter(s) IV Continuous <Continuous>  dextrose 50% Injectable 25 Gram(s) IV Push once  dextrose 50% Injectable 12.5 Gram(s) IV Push once  dextrose 50% Injectable 25 Gram(s) IV Push once  dextrose Oral Gel 15 Gram(s) Oral once  flecainide 50 milliGRAM(s) Oral every 12 hours  gabapentin 300 milliGRAM(s) Oral at bedtime  glucagon  Injectable 1 milliGRAM(s) IntraMuscular once  heparin   Injectable 5000 Unit(s) SubCutaneous every 8 hours  metoprolol succinate ER 50 milliGRAM(s) Oral daily  pantoprazole    Tablet 40 milliGRAM(s) Oral before breakfast  predniSONE   Tablet 40 milliGRAM(s) Oral daily      Objective:  Vital Signs Last 24 Hrs  T(C): 36.7 (22 Apr 2025 04:30), Max: 36.8 (21 Apr 2025 11:00)  T(F): 98 (22 Apr 2025 04:30), Max: 98.2 (21 Apr 2025 11:00)  HR: 70 (22 Apr 2025 04:30) (70 - 72)  BP: 144/74 (22 Apr 2025 04:30) (144/74 - 154/83)  BP(mean): --  RR: 18 (22 Apr 2025 04:30) (18 - 18)  SpO2: 94% (22 Apr 2025 04:30) (93% - 95%)    Parameters below as of 22 Apr 2025 04:30  Patient On (Oxygen Delivery Method): room air        PHYSICAL EXAM:  Constitutional:Well-developed, well nourished--no acute distress  Eyes:SAL, EOMI  Ear/Nose/Throat: no oral lesion, no sinus tenderness on percussion	  Neck:no JVD, no lymphadenopathy, supple  Respiratory: CTA rula  Cardiovascular: S1S2 RRR, no murmurs  Gastrointestinal:soft, (+) BS, no HSM  Extremities:no e/e/c        LABS:                        7.9    14.26 )-----------( 444      ( 21 Apr 2025 07:25 )             26.2     04-21    134[L]  |  98  |  19  ----------------------------<  71  4.5   |  25  |  0.63    Ca    8.5      21 Apr 2025 07:25        Urinalysis Basic - ( 21 Apr 2025 07:25 )    Color: x / Appearance: x / SG: x / pH: x  Gluc: 71 mg/dL / Ketone: x  / Bili: x / Urobili: x   Blood: x / Protein: x / Nitrite: x   Leuk Esterase: x / RBC: x / WBC x   Sq Epi: x / Non Sq Epi: x / Bacteria: x          MICROBIOLOGY:    RECENT CULTURES:  04-16 @ 04:50 Blood Blood-Peripheral                No growth at 5 days    04-16 @ 04:35 Blood Blood-Peripheral                No growth at 5 days          RESPIRATORY CULTURES:              RADIOLOGY & ADDITIONAL STUDIES:        Pager [phone]  After 5 pm/weekends or if no response :[phone]

## 2025-04-22 NOTE — PROGRESS NOTE ADULT - NS ATTEND AMEND GEN_ALL_CORE FT
Patient care and plan discussed and reviewed with Advanced Care Provider. Plan as outlined above edited by me to reflect our discussion.   In addition, I participated in    - Ordering, reviewing, and interpreting labs, testing, and imaging.  - Reviewing prior hospitalization and outpatient records when necessary  - Counselling and educating patient and/or family regarding interpretation of aforementioned items and plan of care.  - Communicating with other health professionals (when not separately reported), and documenting clinical information in the electronic health record.
Patient care and plan discussed and reviewed with Advanced Care Provider. Plan as outlined above edited by me to reflect our discussion.   In addition, I participated in    - Ordering, reviewing, and interpreting labs, testing, and imaging.  - Reviewing prior hospitalization and outpatient records when necessary  - Counselling and educating patient and/or family regarding interpretation of aforementioned items and plan of care.  - Communicating with other health professionals (when not separately reported), and documenting clinical information in the electronic health record.  I had a prolonged conversation with the patient/family regarding hospital course, differential diagnosis and results of diagnostic tests.  Plan of care discussed with patient/family after the evaluation. Patient/family express clear understanding and satisfaction with the plan of care.  Sixty five minutes spent on encounter, of which more than fifty percent of the encounter was spent on counseling and/or coordinating care by the attending physician.
Patient care and plan discussed and reviewed with Advanced Care Provider. Plan as outlined above edited by me to reflect our discussion.   In addition, I participated in    - Ordering, reviewing, and interpreting labs, testing, and imaging.  - Reviewing prior hospitalization and outpatient records when necessary  - Counselling and educating patient and/or family regarding interpretation of aforementioned items and plan of care.  - Communicating with other health professionals (when not separately reported), and documenting clinical information in the electronic health record.

## 2025-04-25 ENCOUNTER — APPOINTMENT (OUTPATIENT)
Dept: CV DIAGNOSTICS | Facility: HOSPITAL | Age: 65
End: 2025-04-25

## 2025-04-25 ENCOUNTER — APPOINTMENT (OUTPATIENT)
Dept: CARDIOLOGY | Facility: CLINIC | Age: 65
End: 2025-04-25

## 2025-04-25 VITALS
WEIGHT: 123 LBS | DIASTOLIC BLOOD PRESSURE: 78 MMHG | HEART RATE: 75 BPM | HEIGHT: 62 IN | BODY MASS INDEX: 22.63 KG/M2 | SYSTOLIC BLOOD PRESSURE: 164 MMHG | OXYGEN SATURATION: 99 %

## 2025-04-25 PROCEDURE — G2211 COMPLEX E/M VISIT ADD ON: CPT | Mod: NC

## 2025-04-25 PROCEDURE — 93000 ELECTROCARDIOGRAM COMPLETE: CPT

## 2025-04-25 PROCEDURE — 99214 OFFICE O/P EST MOD 30 MIN: CPT

## 2025-05-01 ENCOUNTER — APPOINTMENT (OUTPATIENT)
Dept: INTERNAL MEDICINE | Facility: CLINIC | Age: 65
End: 2025-05-01
Payer: COMMERCIAL

## 2025-05-01 ENCOUNTER — LABORATORY RESULT (OUTPATIENT)
Age: 65
End: 2025-05-01

## 2025-05-01 ENCOUNTER — NON-APPOINTMENT (OUTPATIENT)
Age: 65
End: 2025-05-01

## 2025-05-01 ENCOUNTER — APPOINTMENT (OUTPATIENT)
Dept: RHEUMATOLOGY | Facility: CLINIC | Age: 65
End: 2025-05-01
Payer: COMMERCIAL

## 2025-05-01 VITALS
BODY MASS INDEX: 22.63 KG/M2 | OXYGEN SATURATION: 96 % | WEIGHT: 123 LBS | HEART RATE: 78 BPM | HEIGHT: 62 IN | SYSTOLIC BLOOD PRESSURE: 165 MMHG | DIASTOLIC BLOOD PRESSURE: 76 MMHG

## 2025-05-01 VITALS — HEART RATE: 69 BPM | OXYGEN SATURATION: 98 %

## 2025-05-01 VITALS
TEMPERATURE: 98.5 F | SYSTOLIC BLOOD PRESSURE: 130 MMHG | WEIGHT: 124 LBS | HEIGHT: 62 IN | HEART RATE: 73 BPM | BODY MASS INDEX: 22.82 KG/M2 | DIASTOLIC BLOOD PRESSURE: 70 MMHG | OXYGEN SATURATION: 92 %

## 2025-05-01 DIAGNOSIS — R76.8 OTHER SPECIFIED ABNORMAL IMMUNOLOGICAL FINDINGS IN SERUM: ICD-10-CM

## 2025-05-01 DIAGNOSIS — I31.9 DISEASE OF PERICARDIUM, UNSPECIFIED: ICD-10-CM

## 2025-05-01 DIAGNOSIS — K21.9 GASTRO-ESOPHAGEAL REFLUX DISEASE W/OUT ESOPHAGITIS: ICD-10-CM

## 2025-05-01 DIAGNOSIS — R53.81 OTHER MALAISE: ICD-10-CM

## 2025-05-01 DIAGNOSIS — G89.29 PAIN IN UNSPECIFIED JOINT: ICD-10-CM

## 2025-05-01 DIAGNOSIS — R29.898 OTHER SYMPTOMS AND SIGNS INVOLVING THE MUSCULOSKELETAL SYSTEM: ICD-10-CM

## 2025-05-01 DIAGNOSIS — M25.461 EFFUSION, RIGHT KNEE: ICD-10-CM

## 2025-05-01 DIAGNOSIS — I31.39 OTHER PERICARDIAL EFFUSION (NONINFLAMMATORY): ICD-10-CM

## 2025-05-01 DIAGNOSIS — M25.50 PAIN IN UNSPECIFIED JOINT: ICD-10-CM

## 2025-05-01 DIAGNOSIS — R74.01 ELEVATION OF LEVELS OF LIVER TRANSAMINASE LEVELS: ICD-10-CM

## 2025-05-01 PROBLEM — M19.90 INFLAMMATORY ARTHRITIS: Status: ACTIVE | Noted: 2025-05-01

## 2025-05-01 PROBLEM — I47.10 PAROXYSMAL SVT (SUPRAVENTRICULAR TACHYCARDIA): Status: ACTIVE | Noted: 2025-05-01

## 2025-05-01 PROBLEM — M79.89 LEG SWELLING: Status: ACTIVE | Noted: 2025-05-01

## 2025-05-01 LAB
THIOPURINE METHYLTRANSFERASE (TPMT) ENZY: 24.4 — SIGNIFICANT CHANGE UP
TPMT ENZYME METHODOLOGY: SIGNIFICANT CHANGE UP
TPMT GENE PROD MET ACT IMP BLD/T-IMP: SIGNIFICANT CHANGE UP

## 2025-05-01 PROCEDURE — 99495 TRANSJ CARE MGMT MOD F2F 14D: CPT | Mod: 95

## 2025-05-01 PROCEDURE — 99215 OFFICE O/P EST HI 40 MIN: CPT

## 2025-05-01 RX ORDER — METHOTREXATE 2.5 MG/1
2.5 TABLET ORAL
Qty: 16 | Refills: 1 | Status: ACTIVE | COMMUNITY
Start: 2025-05-01 | End: 1900-01-01

## 2025-05-01 RX ORDER — PANTOPRAZOLE 40 MG/1
40 TABLET, DELAYED RELEASE ORAL DAILY
Qty: 90 | Refills: 1 | Status: ACTIVE | COMMUNITY
Start: 2025-05-01

## 2025-05-01 RX ORDER — METOPROLOL SUCCINATE 50 MG/1
50 TABLET, EXTENDED RELEASE ORAL DAILY
Qty: 28 | Refills: 3 | Status: ACTIVE | COMMUNITY
Start: 2025-04-23

## 2025-05-01 RX ORDER — FOLIC ACID 1 MG/1
1 TABLET ORAL DAILY
Qty: 1 | Refills: 3 | Status: ACTIVE | COMMUNITY
Start: 2025-05-01 | End: 1900-01-01

## 2025-05-01 RX ORDER — FLECAINIDE ACETATE 50 MG/1
50 TABLET ORAL
Refills: 0 | Status: ACTIVE | COMMUNITY
Start: 2025-04-23

## 2025-05-02 PROBLEM — R53.81 PHYSICAL DECONDITIONING: Status: ACTIVE | Noted: 2025-05-02

## 2025-05-02 PROBLEM — R29.898 MUSCULAR DECONDITIONING: Status: ACTIVE | Noted: 2025-05-02

## 2025-05-02 PROBLEM — M25.469 KNEE EFFUSION: Status: ACTIVE | Noted: 2025-05-02

## 2025-05-02 LAB
ALBUMIN SERPL ELPH-MCNC: 3.4 G/DL
ALP BLD-CCNC: 91 U/L
ALT SERPL-CCNC: 46 U/L
ANION GAP SERPL CALC-SCNC: 15 MMOL/L
AST SERPL-CCNC: 27 U/L
BASOPHILS # BLD AUTO: 0.02 K/UL
BASOPHILS NFR BLD AUTO: 0.1 %
BILIRUB SERPL-MCNC: 0.3 MG/DL
BUN SERPL-MCNC: 19 MG/DL
CALCIUM SERPL-MCNC: 9.2 MG/DL
CHLORIDE SERPL-SCNC: 97 MMOL/L
CO2 SERPL-SCNC: 25 MMOL/L
CREAT SERPL-MCNC: 0.58 MG/DL
CRP SERPL-MCNC: 47 MG/L
EGFRCR SERPLBLD CKD-EPI 2021: 102 ML/MIN/1.73M2
EOSINOPHIL # BLD AUTO: 0 K/UL
EOSINOPHIL NFR BLD AUTO: 0 %
ERYTHROCYTE [SEDIMENTATION RATE] IN BLOOD BY WESTERGREN METHOD: 111 MM/HR
GLUCOSE SERPL-MCNC: 92 MG/DL
HCT VFR BLD CALC: 30.6 %
HGB BLD-MCNC: 9.1 G/DL
IMM GRANULOCYTES NFR BLD AUTO: 1.1 %
LYMPHOCYTES # BLD AUTO: 0.75 K/UL
LYMPHOCYTES NFR BLD AUTO: 4.5 %
MAN DIFF?: NORMAL
MCHC RBC-ENTMCNC: 23.7 PG
MCHC RBC-ENTMCNC: 29.7 G/DL
MCV RBC AUTO: 79.7 FL
MONOCYTES # BLD AUTO: 0.31 K/UL
MONOCYTES NFR BLD AUTO: 1.9 %
NEUTROPHILS # BLD AUTO: 15.27 K/UL
NEUTROPHILS NFR BLD AUTO: 92.4 %
NT-PROBNP SERPL-MCNC: 3002 PG/ML
PLATELET # BLD AUTO: 338 K/UL
POTASSIUM SERPL-SCNC: 4.8 MMOL/L
PROT SERPL-MCNC: 7 G/DL
RBC # BLD: 3.84 M/UL
RBC # FLD: 24.9 %
SODIUM SERPL-SCNC: 137 MMOL/L
WBC # FLD AUTO: 16.53 K/UL

## 2025-05-02 RX ORDER — PREDNISONE 10 MG/1
10 TABLET ORAL
Qty: 90 | Refills: 0 | Status: ACTIVE | COMMUNITY
Start: 2025-05-01 | End: 1900-01-01

## 2025-05-06 ENCOUNTER — APPOINTMENT (OUTPATIENT)
Dept: INTERNAL MEDICINE | Facility: CLINIC | Age: 65
End: 2025-05-06

## 2025-05-07 LAB
DIAGNOSTIC IMP SPEC-IMP: SIGNIFICANT CHANGE UP
TPMT GENE MUT ANL BLD/T: NEGATIVE — SIGNIFICANT CHANGE UP

## 2025-05-08 ENCOUNTER — APPOINTMENT (OUTPATIENT)
Dept: PULMONOLOGY | Facility: CLINIC | Age: 65
End: 2025-05-08

## 2025-05-08 ENCOUNTER — APPOINTMENT (OUTPATIENT)
Dept: INTERNAL MEDICINE | Facility: CLINIC | Age: 65
End: 2025-05-08
Payer: COMMERCIAL

## 2025-05-08 ENCOUNTER — LABORATORY RESULT (OUTPATIENT)
Age: 65
End: 2025-05-08

## 2025-05-08 VITALS
HEIGHT: 62 IN | OXYGEN SATURATION: 93 % | TEMPERATURE: 98.5 F | DIASTOLIC BLOOD PRESSURE: 70 MMHG | SYSTOLIC BLOOD PRESSURE: 124 MMHG | HEART RATE: 73 BPM | WEIGHT: 122 LBS | BODY MASS INDEX: 22.45 KG/M2

## 2025-05-08 DIAGNOSIS — R22.41 LOCALIZED SWELLING, MASS AND LUMP, RIGHT LOWER LIMB: ICD-10-CM

## 2025-05-08 DIAGNOSIS — I10 ESSENTIAL (PRIMARY) HYPERTENSION: ICD-10-CM

## 2025-05-08 DIAGNOSIS — M19.90 UNSPECIFIED OSTEOARTHRITIS, UNSPECIFIED SITE: ICD-10-CM

## 2025-05-08 DIAGNOSIS — R05.9 COUGH, UNSPECIFIED: ICD-10-CM

## 2025-05-08 DIAGNOSIS — I47.10 SUPRAVENTRICULAR TACHYCARDIA, UNSPECIFIED: ICD-10-CM

## 2025-05-08 DIAGNOSIS — M79.89 OTHER SPECIFIED SOFT TISSUE DISORDERS: ICD-10-CM

## 2025-05-08 DIAGNOSIS — M25.469 EFFUSION, UNSPECIFIED KNEE: ICD-10-CM

## 2025-05-08 DIAGNOSIS — R76.8 OTHER SPECIFIED ABNORMAL IMMUNOLOGICAL FINDINGS IN SERUM: ICD-10-CM

## 2025-05-08 DIAGNOSIS — R13.12 DYSPHAGIA, OROPHARYNGEAL PHASE: ICD-10-CM

## 2025-05-08 PROCEDURE — 99214 OFFICE O/P EST MOD 30 MIN: CPT

## 2025-05-08 PROCEDURE — G2211 COMPLEX E/M VISIT ADD ON: CPT | Mod: NC

## 2025-05-09 LAB
ANION GAP SERPL CALC-SCNC: 15 MMOL/L
BASOPHILS # BLD AUTO: 0 K/UL
BASOPHILS NFR BLD AUTO: 0 %
BUN SERPL-MCNC: 20 MG/DL
CALCIUM SERPL-MCNC: 9 MG/DL
CHLORIDE SERPL-SCNC: 98 MMOL/L
CO2 SERPL-SCNC: 26 MMOL/L
CREAT SERPL-MCNC: 0.7 MG/DL
EGFRCR SERPLBLD CKD-EPI 2021: 97 ML/MIN/1.73M2
EOSINOPHIL # BLD AUTO: 0.1 K/UL
EOSINOPHIL NFR BLD AUTO: 0.9 %
GLUCOSE SERPL-MCNC: 77 MG/DL
HCT VFR BLD CALC: 31.5 %
HGB BLD-MCNC: 9.3 G/DL
LYMPHOCYTES # BLD AUTO: 1.23 K/UL
LYMPHOCYTES NFR BLD AUTO: 11.5 %
MAN DIFF?: NORMAL
MCHC RBC-ENTMCNC: 23.6 PG
MCHC RBC-ENTMCNC: 29.5 G/DL
MCV RBC AUTO: 79.9 FL
MEV IGG FLD QL IA: >300 AU/ML
MEV IGG+IGM SER-IMP: POSITIVE
MONOCYTES # BLD AUTO: 0.28 K/UL
MONOCYTES NFR BLD AUTO: 2.6 %
MUV AB SER-ACNC: POSITIVE
MUV IGG SER QL IA: >300 AU/ML
NEUTROPHILS # BLD AUTO: 8.77 K/UL
NEUTROPHILS NFR BLD AUTO: 82.3 %
NT-PROBNP SERPL-MCNC: 1852 PG/ML
PLATELET # BLD AUTO: 269 K/UL
POTASSIUM SERPL-SCNC: 3.9 MMOL/L
RBC # BLD: 3.94 M/UL
RBC # FLD: 23 %
RUBV IGG FLD-ACNC: 14.8 INDEX
RUBV IGG SER-IMP: POSITIVE
SODIUM SERPL-SCNC: 139 MMOL/L
VZV AB TITR SER: POSITIVE
VZV IGG SER IF-ACNC: 19.2 S/CO
WBC # FLD AUTO: 10.66 K/UL

## 2025-05-09 RX ORDER — FUROSEMIDE 20 MG/1
20 TABLET ORAL
Qty: 8 | Refills: 0 | Status: ACTIVE | COMMUNITY
Start: 2025-05-01 | End: 1900-01-01

## 2025-05-19 ENCOUNTER — APPOINTMENT (OUTPATIENT)
Dept: INTERNAL MEDICINE | Facility: CLINIC | Age: 65
End: 2025-05-19
Payer: COMMERCIAL

## 2025-05-19 VITALS — SYSTOLIC BLOOD PRESSURE: 160 MMHG | DIASTOLIC BLOOD PRESSURE: 82 MMHG

## 2025-05-19 VITALS
TEMPERATURE: 97.9 F | HEART RATE: 56 BPM | BODY MASS INDEX: 21.9 KG/M2 | SYSTOLIC BLOOD PRESSURE: 174 MMHG | OXYGEN SATURATION: 94 % | DIASTOLIC BLOOD PRESSURE: 80 MMHG | HEIGHT: 62 IN | WEIGHT: 119 LBS | RESPIRATION RATE: 16 BRPM

## 2025-05-19 VITALS — OXYGEN SATURATION: 97 %

## 2025-05-19 DIAGNOSIS — R22.41 LOCALIZED SWELLING, MASS AND LUMP, RIGHT LOWER LIMB: ICD-10-CM

## 2025-05-19 DIAGNOSIS — I31.39 OTHER PERICARDIAL EFFUSION (NONINFLAMMATORY): ICD-10-CM

## 2025-05-19 DIAGNOSIS — M79.89 OTHER SPECIFIED SOFT TISSUE DISORDERS: ICD-10-CM

## 2025-05-19 DIAGNOSIS — R76.8 OTHER SPECIFIED ABNORMAL IMMUNOLOGICAL FINDINGS IN SERUM: ICD-10-CM

## 2025-05-19 DIAGNOSIS — I10 ESSENTIAL (PRIMARY) HYPERTENSION: ICD-10-CM

## 2025-05-19 DIAGNOSIS — R13.12 DYSPHAGIA, OROPHARYNGEAL PHASE: ICD-10-CM

## 2025-05-19 DIAGNOSIS — M19.90 UNSPECIFIED OSTEOARTHRITIS, UNSPECIFIED SITE: ICD-10-CM

## 2025-05-19 DIAGNOSIS — R05.9 COUGH, UNSPECIFIED: ICD-10-CM

## 2025-05-19 DIAGNOSIS — S39.92XA UNSPECIFIED INJURY OF LOWER BACK, INITIAL ENCOUNTER: ICD-10-CM

## 2025-05-19 PROCEDURE — G2211 COMPLEX E/M VISIT ADD ON: CPT | Mod: NC

## 2025-05-19 PROCEDURE — 99214 OFFICE O/P EST MOD 30 MIN: CPT

## 2025-05-20 PROBLEM — S39.92XA TAILBONE INJURY: Status: ACTIVE | Noted: 2025-05-19

## 2025-05-24 ENCOUNTER — NON-APPOINTMENT (OUTPATIENT)
Age: 65
End: 2025-05-24

## 2025-05-24 DIAGNOSIS — R22.1 LOCALIZED SWELLING, MASS AND LUMP, NECK: ICD-10-CM

## 2025-05-27 ENCOUNTER — APPOINTMENT (OUTPATIENT)
Dept: RADIOLOGY | Facility: IMAGING CENTER | Age: 65
End: 2025-05-27
Payer: COMMERCIAL

## 2025-05-27 ENCOUNTER — OUTPATIENT (OUTPATIENT)
Dept: OUTPATIENT SERVICES | Facility: HOSPITAL | Age: 65
LOS: 1 days | End: 2025-05-27
Payer: COMMERCIAL

## 2025-05-27 DIAGNOSIS — S39.92XA UNSPECIFIED INJURY OF LOWER BACK, INITIAL ENCOUNTER: ICD-10-CM

## 2025-05-27 PROCEDURE — 72220 X-RAY EXAM SACRUM TAILBONE: CPT | Mod: 26

## 2025-05-27 PROCEDURE — 72100 X-RAY EXAM L-S SPINE 2/3 VWS: CPT

## 2025-05-27 PROCEDURE — 73501 X-RAY EXAM HIP UNI 1 VIEW: CPT

## 2025-05-27 PROCEDURE — 73501 X-RAY EXAM HIP UNI 1 VIEW: CPT | Mod: 26,LT

## 2025-05-27 PROCEDURE — 72100 X-RAY EXAM L-S SPINE 2/3 VWS: CPT | Mod: 26

## 2025-05-27 PROCEDURE — 72220 X-RAY EXAM SACRUM TAILBONE: CPT

## 2025-05-29 ENCOUNTER — NON-APPOINTMENT (OUTPATIENT)
Age: 65
End: 2025-05-29

## 2025-05-29 ENCOUNTER — APPOINTMENT (OUTPATIENT)
Dept: ELECTROPHYSIOLOGY | Facility: CLINIC | Age: 65
End: 2025-05-29
Payer: COMMERCIAL

## 2025-05-29 VITALS — HEART RATE: 70 BPM | DIASTOLIC BLOOD PRESSURE: 81 MMHG | SYSTOLIC BLOOD PRESSURE: 184 MMHG | OXYGEN SATURATION: 99 %

## 2025-05-29 VITALS — DIASTOLIC BLOOD PRESSURE: 81 MMHG | SYSTOLIC BLOOD PRESSURE: 183 MMHG

## 2025-05-29 DIAGNOSIS — M35.9 SYSTEMIC INVOLVEMENT OF CONNECTIVE TISSUE, UNSPECIFIED: ICD-10-CM

## 2025-05-29 DIAGNOSIS — R00.0 TACHYCARDIA, UNSPECIFIED: ICD-10-CM

## 2025-05-29 DIAGNOSIS — I47.10 SUPRAVENTRICULAR TACHYCARDIA, UNSPECIFIED: ICD-10-CM

## 2025-05-29 PROCEDURE — 93000 ELECTROCARDIOGRAM COMPLETE: CPT

## 2025-05-29 PROCEDURE — 99205 OFFICE O/P NEW HI 60 MIN: CPT

## 2025-05-29 RX ORDER — AMLODIPINE BESYLATE 5 MG/1
5 TABLET ORAL
Qty: 90 | Refills: 0 | Status: DISCONTINUED | COMMUNITY
Start: 2025-05-19 | End: 2025-05-29

## 2025-06-04 DIAGNOSIS — M19.90 UNSPECIFIED OSTEOARTHRITIS, UNSPECIFIED SITE: ICD-10-CM

## 2025-06-06 ENCOUNTER — LABORATORY RESULT (OUTPATIENT)
Age: 65
End: 2025-06-06

## 2025-06-09 LAB
ALBUMIN SERPL ELPH-MCNC: 3.7 G/DL
ALP BLD-CCNC: 72 U/L
ALT SERPL-CCNC: 44 U/L
ANION GAP SERPL CALC-SCNC: 13 MMOL/L
AST SERPL-CCNC: 27 U/L
BASOPHILS # BLD AUTO: 0.01 K/UL
BASOPHILS NFR BLD AUTO: 0.1 %
BILIRUB SERPL-MCNC: 0.2 MG/DL
BUN SERPL-MCNC: 19 MG/DL
CALCIUM SERPL-MCNC: 9.3 MG/DL
CHLORIDE SERPL-SCNC: 103 MMOL/L
CO2 SERPL-SCNC: 26 MMOL/L
CREAT SERPL-MCNC: 0.85 MG/DL
CRP SERPL-MCNC: 5 MG/L
EGFRCR SERPLBLD CKD-EPI 2021: 77 ML/MIN/1.73M2
EOSINOPHIL # BLD AUTO: 0.01 K/UL
EOSINOPHIL NFR BLD AUTO: 0.1 %
ERYTHROCYTE [SEDIMENTATION RATE] IN BLOOD BY WESTERGREN METHOD: 43 MM/HR
HCT VFR BLD CALC: 36.9 %
HGB BLD-MCNC: 10.9 G/DL
IMM GRANULOCYTES NFR BLD AUTO: 1.1 %
LYMPHOCYTES # BLD AUTO: 1.58 K/UL
LYMPHOCYTES NFR BLD AUTO: 20.8 %
MAN DIFF?: NORMAL
MCHC RBC-ENTMCNC: 23.7 PG
MCHC RBC-ENTMCNC: 29.5 G/DL
MCV RBC AUTO: 80.4 FL
MONOCYTES # BLD AUTO: 0.5 K/UL
MONOCYTES NFR BLD AUTO: 6.6 %
NEUTROPHILS # BLD AUTO: 5.43 K/UL
NEUTROPHILS NFR BLD AUTO: 71.3 %
PLATELET # BLD AUTO: 266 K/UL
POTASSIUM SERPL-SCNC: 4.2 MMOL/L
PROT SERPL-MCNC: 6.1 G/DL
RBC # BLD: 4.59 M/UL
RBC # FLD: 20.1 %
SODIUM SERPL-SCNC: 141 MMOL/L
WBC # FLD AUTO: 7.61 K/UL

## 2025-06-16 ENCOUNTER — APPOINTMENT (OUTPATIENT)
Dept: ULTRASOUND IMAGING | Facility: IMAGING CENTER | Age: 65
End: 2025-06-16

## 2025-06-17 ENCOUNTER — NON-APPOINTMENT (OUTPATIENT)
Age: 65
End: 2025-06-17

## 2025-06-17 ENCOUNTER — APPOINTMENT (OUTPATIENT)
Dept: INTERNAL MEDICINE | Facility: CLINIC | Age: 65
End: 2025-06-17

## 2025-06-17 VITALS — SYSTOLIC BLOOD PRESSURE: 170 MMHG | DIASTOLIC BLOOD PRESSURE: 90 MMHG

## 2025-06-17 VITALS
BODY MASS INDEX: 21.53 KG/M2 | OXYGEN SATURATION: 95 % | DIASTOLIC BLOOD PRESSURE: 90 MMHG | WEIGHT: 117 LBS | HEART RATE: 55 BPM | HEIGHT: 62 IN | SYSTOLIC BLOOD PRESSURE: 160 MMHG

## 2025-06-17 PROCEDURE — G2211 COMPLEX E/M VISIT ADD ON: CPT | Mod: NC

## 2025-06-17 PROCEDURE — 93000 ELECTROCARDIOGRAM COMPLETE: CPT

## 2025-06-17 PROCEDURE — 99214 OFFICE O/P EST MOD 30 MIN: CPT

## 2025-06-18 LAB
ALBUMIN SERPL ELPH-MCNC: 3.8 G/DL
ALP BLD-CCNC: 73 U/L
ALT SERPL-CCNC: 42 U/L
ANION GAP SERPL CALC-SCNC: 14 MMOL/L
AST SERPL-CCNC: 22 U/L
BILIRUB DIRECT SERPL-MCNC: <0.08 MG/DL
BILIRUB INDIRECT SERPL-MCNC: 0.1 MG/DL
BILIRUB SERPL-MCNC: 0.2 MG/DL
BUN SERPL-MCNC: 17 MG/DL
CALCIUM SERPL-MCNC: 9.2 MG/DL
CHLORIDE SERPL-SCNC: 100 MMOL/L
CO2 SERPL-SCNC: 26 MMOL/L
CREAT SERPL-MCNC: 0.84 MG/DL
CRP SERPL-MCNC: 4 MG/L
EGFRCR SERPLBLD CKD-EPI 2021: 78 ML/MIN/1.73M2
ERYTHROCYTE [SEDIMENTATION RATE] IN BLOOD BY WESTERGREN METHOD: 16 MM/HR
GLUCOSE SERPL-MCNC: 72 MG/DL
NT-PROBNP SERPL-MCNC: 434 PG/ML
POTASSIUM SERPL-SCNC: 4.7 MMOL/L
PROT SERPL-MCNC: 6.6 G/DL
SODIUM SERPL-SCNC: 140 MMOL/L

## 2025-06-24 ENCOUNTER — APPOINTMENT (OUTPATIENT)
Dept: INTERNAL MEDICINE | Facility: CLINIC | Age: 65
End: 2025-06-24
Payer: COMMERCIAL

## 2025-06-24 VITALS
HEART RATE: 83 BPM | HEIGHT: 62 IN | WEIGHT: 117 LBS | SYSTOLIC BLOOD PRESSURE: 140 MMHG | DIASTOLIC BLOOD PRESSURE: 70 MMHG | BODY MASS INDEX: 21.53 KG/M2 | OXYGEN SATURATION: 97 %

## 2025-06-24 VITALS — SYSTOLIC BLOOD PRESSURE: 160 MMHG | DIASTOLIC BLOOD PRESSURE: 80 MMHG

## 2025-06-24 PROCEDURE — G2211 COMPLEX E/M VISIT ADD ON: CPT | Mod: NC

## 2025-06-24 PROCEDURE — 99214 OFFICE O/P EST MOD 30 MIN: CPT

## 2025-06-24 RX ORDER — LOSARTAN POTASSIUM 50 MG/1
50 TABLET, FILM COATED ORAL DAILY
Qty: 90 | Refills: 0 | Status: ACTIVE | COMMUNITY
Start: 2025-06-17 | End: 1900-01-01

## 2025-06-30 ENCOUNTER — APPOINTMENT (OUTPATIENT)
Dept: INTERNAL MEDICINE | Facility: CLINIC | Age: 65
End: 2025-06-30

## 2025-07-02 ENCOUNTER — APPOINTMENT (OUTPATIENT)
Dept: INTERNAL MEDICINE | Facility: CLINIC | Age: 65
End: 2025-07-02

## 2025-07-02 ENCOUNTER — APPOINTMENT (OUTPATIENT)
Dept: RHEUMATOLOGY | Facility: CLINIC | Age: 65
End: 2025-07-02
Payer: COMMERCIAL

## 2025-07-02 VITALS
BODY MASS INDEX: 21.9 KG/M2 | SYSTOLIC BLOOD PRESSURE: 177 MMHG | OXYGEN SATURATION: 98 % | WEIGHT: 119 LBS | HEART RATE: 78 BPM | RESPIRATION RATE: 16 BRPM | DIASTOLIC BLOOD PRESSURE: 78 MMHG | HEIGHT: 62 IN

## 2025-07-02 PROCEDURE — 99214 OFFICE O/P EST MOD 30 MIN: CPT

## 2025-07-02 PROCEDURE — 99213 OFFICE O/P EST LOW 20 MIN: CPT | Mod: 95

## 2025-07-02 PROCEDURE — G2211 COMPLEX E/M VISIT ADD ON: CPT | Mod: NC,95

## 2025-07-02 PROCEDURE — G2211 COMPLEX E/M VISIT ADD ON: CPT | Mod: NC

## 2025-07-07 ENCOUNTER — RESULT REVIEW (OUTPATIENT)
Age: 65
End: 2025-07-07

## 2025-07-07 ENCOUNTER — OUTPATIENT (OUTPATIENT)
Dept: OUTPATIENT SERVICES | Facility: HOSPITAL | Age: 65
LOS: 1 days | End: 2025-07-07
Payer: COMMERCIAL

## 2025-07-07 ENCOUNTER — APPOINTMENT (OUTPATIENT)
Dept: ULTRASOUND IMAGING | Facility: IMAGING CENTER | Age: 65
End: 2025-07-07

## 2025-07-07 ENCOUNTER — OUTPATIENT (OUTPATIENT)
Dept: OUTPATIENT SERVICES | Facility: HOSPITAL | Age: 65
LOS: 1 days | End: 2025-07-07

## 2025-07-07 DIAGNOSIS — R22.41 LOCALIZED SWELLING, MASS AND LUMP, RIGHT LOWER LIMB: ICD-10-CM

## 2025-07-07 DIAGNOSIS — Z00.8 ENCOUNTER FOR OTHER GENERAL EXAMINATION: ICD-10-CM

## 2025-07-07 PROCEDURE — 76882 US LMTD JT/FCL EVL NVASC XTR: CPT | Mod: 26,RT

## 2025-07-07 PROCEDURE — 76882 US LMTD JT/FCL EVL NVASC XTR: CPT

## 2025-07-08 PROBLEM — Z79.899 HIGH RISK MEDICATION USE: Status: ACTIVE | Noted: 2025-07-08

## 2025-07-11 PROBLEM — S80.10XA HEMATOMA OF LEG: Status: ACTIVE | Noted: 2025-07-11

## 2025-07-14 ENCOUNTER — RX RENEWAL (OUTPATIENT)
Age: 65
End: 2025-07-14

## 2025-07-14 RX ORDER — PREDNISONE 5 MG/1
5 TABLET ORAL
Qty: 30 | Refills: 2 | Status: ACTIVE | COMMUNITY
Start: 2025-07-14 | End: 1900-01-01

## 2025-07-15 PROBLEM — Z00.00 ENCOUNTER FOR PREVENTIVE HEALTH EXAMINATION: Status: ACTIVE | Noted: 2025-07-15

## 2025-07-16 ENCOUNTER — APPOINTMENT (OUTPATIENT)
Dept: OTOLARYNGOLOGY | Facility: CLINIC | Age: 65
End: 2025-07-16

## 2025-07-17 ENCOUNTER — APPOINTMENT (OUTPATIENT)
Dept: ORTHOPEDIC SURGERY | Facility: CLINIC | Age: 65
End: 2025-07-17
Payer: COMMERCIAL

## 2025-07-17 VITALS — BODY MASS INDEX: 22.08 KG/M2 | WEIGHT: 120 LBS | HEIGHT: 62 IN

## 2025-07-17 PROBLEM — R22.41 SUBCUTANEOUS MASS OF RIGHT LOWER LEG: Status: ACTIVE | Noted: 2025-07-17

## 2025-07-17 PROCEDURE — 99203 OFFICE O/P NEW LOW 30 MIN: CPT

## 2025-07-17 PROCEDURE — 73590 X-RAY EXAM OF LOWER LEG: CPT | Mod: RT

## 2025-07-22 ENCOUNTER — APPOINTMENT (OUTPATIENT)
Dept: PULMONOLOGY | Facility: CLINIC | Age: 65
End: 2025-07-22
Payer: COMMERCIAL

## 2025-07-22 VITALS
SYSTOLIC BLOOD PRESSURE: 172 MMHG | HEART RATE: 70 BPM | WEIGHT: 118 LBS | DIASTOLIC BLOOD PRESSURE: 78 MMHG | OXYGEN SATURATION: 92 % | BODY MASS INDEX: 21.71 KG/M2 | HEIGHT: 62 IN

## 2025-07-22 DIAGNOSIS — M35.9 SYSTEMIC INVOLVEMENT OF CONNECTIVE TISSUE, UNSPECIFIED: ICD-10-CM

## 2025-07-22 PROCEDURE — G2211 COMPLEX E/M VISIT ADD ON: CPT | Mod: NC

## 2025-07-22 PROCEDURE — 99215 OFFICE O/P EST HI 40 MIN: CPT

## 2025-07-28 ENCOUNTER — APPOINTMENT (OUTPATIENT)
Dept: CARDIOLOGY | Facility: CLINIC | Age: 65
End: 2025-07-28
Payer: COMMERCIAL

## 2025-07-28 VITALS
DIASTOLIC BLOOD PRESSURE: 79 MMHG | HEIGHT: 62 IN | SYSTOLIC BLOOD PRESSURE: 156 MMHG | OXYGEN SATURATION: 100 % | HEART RATE: 70 BPM | WEIGHT: 120 LBS | BODY MASS INDEX: 22.08 KG/M2

## 2025-07-28 PROCEDURE — 93000 ELECTROCARDIOGRAM COMPLETE: CPT

## 2025-07-28 PROCEDURE — 99215 OFFICE O/P EST HI 40 MIN: CPT

## 2025-07-30 DIAGNOSIS — R76.8 OTHER SPECIFIED ABNORMAL IMMUNOLOGICAL FINDINGS IN SERUM: ICD-10-CM

## 2025-08-04 ENCOUNTER — APPOINTMENT (OUTPATIENT)
Dept: RHEUMATOLOGY | Facility: CLINIC | Age: 65
End: 2025-08-04

## 2025-08-11 ENCOUNTER — RX RENEWAL (OUTPATIENT)
Age: 65
End: 2025-08-11

## 2025-08-15 DIAGNOSIS — M19.90 UNSPECIFIED OSTEOARTHRITIS, UNSPECIFIED SITE: ICD-10-CM

## 2025-08-20 ENCOUNTER — APPOINTMENT (OUTPATIENT)
Dept: CV DIAGNOSITCS | Facility: HOSPITAL | Age: 65
End: 2025-08-20

## 2025-08-20 ENCOUNTER — OUTPATIENT (OUTPATIENT)
Dept: OUTPATIENT SERVICES | Facility: HOSPITAL | Age: 65
LOS: 1 days | End: 2025-08-20
Payer: COMMERCIAL

## 2025-08-20 ENCOUNTER — RESULT REVIEW (OUTPATIENT)
Age: 65
End: 2025-08-20

## 2025-08-20 DIAGNOSIS — I25.10 ATHEROSCLEROTIC HEART DISEASE OF NATIVE CORONARY ARTERY WITHOUT ANGINA PECTORIS: ICD-10-CM

## 2025-08-20 PROCEDURE — 93356 MYOCRD STRAIN IMG SPCKL TRCK: CPT

## 2025-08-20 PROCEDURE — 93306 TTE W/DOPPLER COMPLETE: CPT | Mod: 26

## 2025-08-28 DIAGNOSIS — D50.9 IRON DEFICIENCY ANEMIA, UNSPECIFIED: ICD-10-CM

## 2025-08-28 DIAGNOSIS — K92.1 MELENA: ICD-10-CM

## 2025-08-28 DIAGNOSIS — Z12.11 ENCOUNTER FOR SCREENING FOR MALIGNANT NEOPLASM OF COLON: ICD-10-CM

## 2025-08-28 RX ORDER — SODIUM SULFATE, POTASSIUM SULFATE AND MAGNESIUM SULFATE 1.6; 3.13; 17.5 G/177ML; G/177ML; G/177ML
17.5-3.13-1.6 SOLUTION ORAL
Qty: 1 | Refills: 0 | Status: ACTIVE | COMMUNITY
Start: 2025-08-28 | End: 1900-01-01

## 2025-09-03 ENCOUNTER — LABORATORY RESULT (OUTPATIENT)
Age: 65
End: 2025-09-03

## 2025-09-04 LAB
HCT VFR BLD CALC: 34.1 %
HGB BLD-MCNC: 10.4 G/DL
MCHC RBC-ENTMCNC: 24.1 PG
MCHC RBC-ENTMCNC: 30.5 G/DL
MCV RBC AUTO: 79.1 FL
PLATELET # BLD AUTO: 298 K/UL
RBC # BLD: 4.31 M/UL
RBC # FLD: 20.2 %
WBC # FLD AUTO: 15.4 K/UL

## 2025-09-05 ENCOUNTER — APPOINTMENT (OUTPATIENT)
Dept: RHEUMATOLOGY | Facility: CLINIC | Age: 65
End: 2025-09-05

## 2025-09-05 VITALS
SYSTOLIC BLOOD PRESSURE: 139 MMHG | DIASTOLIC BLOOD PRESSURE: 81 MMHG | HEIGHT: 62 IN | WEIGHT: 114 LBS | OXYGEN SATURATION: 94 % | BODY MASS INDEX: 20.98 KG/M2 | RESPIRATION RATE: 14 BRPM | HEART RATE: 82 BPM

## 2025-09-05 DIAGNOSIS — Z79.899 OTHER LONG TERM (CURRENT) DRUG THERAPY: ICD-10-CM

## 2025-09-05 DIAGNOSIS — R05.9 COUGH, UNSPECIFIED: ICD-10-CM

## 2025-09-12 ENCOUNTER — NON-APPOINTMENT (OUTPATIENT)
Age: 65
End: 2025-09-12

## 2025-09-16 ENCOUNTER — RX RENEWAL (OUTPATIENT)
Age: 65
End: 2025-09-16

## (undated) DEVICE — BITE BLOCK ADULT 20 X 27MM (GREEN)

## (undated) DEVICE — CATH IV SAFE BC 20G X 1.16" (PINK)

## (undated) DEVICE — SENSOR O2 FINGER ADULT

## (undated) DEVICE — BIOPSY FORCEP RADIAL JAW 4 STANDARD WITH NEEDLE

## (undated) DEVICE — BALLOON US ENDO

## (undated) DEVICE — SYR ALLIANCE II INFLATION 60ML

## (undated) DEVICE — TUBING IV SET GRAVITY 3Y 100" MACRO

## (undated) DEVICE — SOL INJ NS 0.9% 500ML 2 PORT

## (undated) DEVICE — SUCTION YANKAUER NO CONTROL VENT

## (undated) DEVICE — TUBING SUCTION 20FT

## (undated) DEVICE — FOLEY HOLDER STATLOCK 2 WAY ADULT

## (undated) DEVICE — CATH IV SAFE BC 22G X 1" (BLUE)

## (undated) DEVICE — PACK IV START WITH CHG

## (undated) DEVICE — TUBING SUCTION CONN 6FT STERILE